# Patient Record
Sex: MALE | Race: WHITE | NOT HISPANIC OR LATINO | Employment: OTHER | URBAN - METROPOLITAN AREA
[De-identification: names, ages, dates, MRNs, and addresses within clinical notes are randomized per-mention and may not be internally consistent; named-entity substitution may affect disease eponyms.]

---

## 2023-03-17 LAB
ANION GAP IN SER/PLAS: 13 MMOL/L (ref 10–20)
CALCIUM (MG/DL) IN SER/PLAS: 9.8 MG/DL (ref 8.6–10.3)
CARBON DIOXIDE, TOTAL (MMOL/L) IN SER/PLAS: 29 MMOL/L (ref 21–32)
CHLORIDE (MMOL/L) IN SER/PLAS: 98 MMOL/L (ref 98–107)
CREATININE (MG/DL) IN SER/PLAS: 0.98 MG/DL (ref 0.5–1.3)
GFR MALE: 80 ML/MIN/1.73M2
GLUCOSE (MG/DL) IN SER/PLAS: 103 MG/DL (ref 74–99)
POTASSIUM (MMOL/L) IN SER/PLAS: 4.4 MMOL/L (ref 3.5–5.3)
SODIUM (MMOL/L) IN SER/PLAS: 136 MMOL/L (ref 136–145)
UREA NITROGEN (MG/DL) IN SER/PLAS: 28 MG/DL (ref 6–23)

## 2023-12-15 ENCOUNTER — OFFICE VISIT (OUTPATIENT)
Dept: CARDIOLOGY | Facility: CLINIC | Age: 73
End: 2023-12-15
Payer: MEDICARE

## 2023-12-15 VITALS
HEART RATE: 72 BPM | BODY MASS INDEX: 29.47 KG/M2 | SYSTOLIC BLOOD PRESSURE: 98 MMHG | WEIGHT: 199 LBS | DIASTOLIC BLOOD PRESSURE: 54 MMHG | HEIGHT: 69 IN

## 2023-12-15 DIAGNOSIS — I25.10 CORONARY ARTERY DISEASE INVOLVING NATIVE CORONARY ARTERY OF NATIVE HEART WITHOUT ANGINA PECTORIS: ICD-10-CM

## 2023-12-15 DIAGNOSIS — I35.0 NONRHEUMATIC AORTIC VALVE STENOSIS: ICD-10-CM

## 2023-12-15 DIAGNOSIS — F17.200 NICOTINE DEPENDENCE, UNCOMPLICATED, UNSPECIFIED NICOTINE PRODUCT TYPE: ICD-10-CM

## 2023-12-15 DIAGNOSIS — I50.22 CHRONIC SYSTOLIC HEART FAILURE (MULTI): Primary | ICD-10-CM

## 2023-12-15 PROCEDURE — 4004F PT TOBACCO SCREEN RCVD TLK: CPT | Performed by: INTERNAL MEDICINE

## 2023-12-15 PROCEDURE — 1159F MED LIST DOCD IN RCRD: CPT | Performed by: INTERNAL MEDICINE

## 2023-12-15 PROCEDURE — 1160F RVW MEDS BY RX/DR IN RCRD: CPT | Performed by: INTERNAL MEDICINE

## 2023-12-15 PROCEDURE — 99215 OFFICE O/P EST HI 40 MIN: CPT | Performed by: INTERNAL MEDICINE

## 2023-12-15 PROCEDURE — 1125F AMNT PAIN NOTED PAIN PRSNT: CPT | Performed by: INTERNAL MEDICINE

## 2023-12-15 RX ORDER — TRAZODONE HYDROCHLORIDE 50 MG/1
50 TABLET ORAL NIGHTLY
COMMUNITY
Start: 2023-12-14

## 2023-12-15 RX ORDER — SPIRONOLACTONE 25 MG/1
12.5 TABLET ORAL DAILY
COMMUNITY
Start: 2023-01-17

## 2023-12-15 RX ORDER — MULTIVIT-MIN/IRON FUM/FOLIC AC 7.5 MG-4
1 TABLET ORAL DAILY
COMMUNITY
Start: 2023-01-13

## 2023-12-15 RX ORDER — LAMOTRIGINE 25 MG/1
150 TABLET ORAL 2 TIMES DAILY
COMMUNITY

## 2023-12-15 RX ORDER — ATORVASTATIN CALCIUM 20 MG/1
20 TABLET, FILM COATED ORAL DAILY
COMMUNITY
Start: 2023-01-17

## 2023-12-15 RX ORDER — LANOLIN ALCOHOL/MO/W.PET/CERES
400 CREAM (GRAM) TOPICAL DAILY
COMMUNITY
Start: 2016-02-24

## 2023-12-15 RX ORDER — PANTOPRAZOLE SODIUM 20 MG/1
20 TABLET, DELAYED RELEASE ORAL DAILY
COMMUNITY
Start: 2023-01-17 | End: 2023-12-15 | Stop reason: ALTCHOICE

## 2023-12-15 RX ORDER — FOLIC ACID 1 MG/1
1 TABLET ORAL DAILY
COMMUNITY
Start: 2023-01-13

## 2023-12-15 RX ORDER — FERROUS SULFATE 325(65) MG
325 TABLET ORAL DAILY
COMMUNITY
Start: 2023-05-30

## 2023-12-15 RX ORDER — METOPROLOL SUCCINATE 25 MG/1
25 TABLET, EXTENDED RELEASE ORAL 2 TIMES DAILY
COMMUNITY
Start: 2020-05-08

## 2023-12-15 RX ORDER — ISOSORBIDE MONONITRATE 10 MG/1
10 TABLET ORAL DAILY
COMMUNITY
Start: 2023-06-16

## 2023-12-15 RX ORDER — ASPIRIN 81 MG/1
81 TABLET ORAL DAILY
COMMUNITY
Start: 2023-01-17

## 2023-12-15 RX ORDER — BUMETANIDE 2 MG/1
2 TABLET ORAL 2 TIMES DAILY
COMMUNITY
Start: 2023-01-17

## 2023-12-15 RX ORDER — NICOTINE POLACRILEX 2 MG/1
2 LOZENGE ORAL EVERY 2 HOUR PRN
COMMUNITY

## 2023-12-15 RX ORDER — MELATONIN 3 MG
6 CAPSULE ORAL NIGHTLY PRN
COMMUNITY
Start: 2023-09-18

## 2023-12-15 RX ORDER — POTASSIUM CHLORIDE 750 MG/1
10 TABLET, EXTENDED RELEASE ORAL DAILY
COMMUNITY

## 2023-12-15 RX ORDER — IPRATROPIUM BROMIDE AND ALBUTEROL SULFATE 2.5; .5 MG/3ML; MG/3ML
SOLUTION RESPIRATORY (INHALATION)
COMMUNITY
Start: 2016-02-24

## 2023-12-15 NOTE — PROGRESS NOTES
"Chief Complaint:   Please see below.     History Of Present Illness:    Milton Lane is a 73 y.o. male presenting with CAD, chronic systolic heart failure.    This 73-year-old man with chronic CAD, chronic ischemic cardiomyopathy with LVEF of 35 to 40% with a pseudo normal diastolic filling pattern by echo January 2023, COPD, and mild aortic stenosis comes to the office in routine follow-up.     During his January 2023 hospitalization, despite having abnormal testing and decompensated symptoms, he unequivocally preferred conservative medical management over an invasive approach. We were constrained by medical intolerances. He has a true intolerance of ACE inhibitors and ARB's for which reason he is not on these medications or Entresto. Please see my prior notes for complete details. Overall he has been stable.     Two months ago he ran out of his medications, during which time he had episodic chest pain.  He now assures me that he is compliant with all medications as outlined on today's list.  [facility]    The patient is still smoking despite my warnings.       Last Recorded Vitals:  Vitals:    12/15/23 0900   BP: 98/54   BP Location: Left arm   Patient Position: Sitting   Pulse: 72   Weight: 90.3 kg (199 lb)   Height: 1.753 m (5' 9\")       Past Medical History:  He has no past medical history on file.    Past Surgical History:  He has no past surgical history on file.      Social History:  He reports that he has been smoking cigarettes. He has never used smokeless tobacco. He reports current alcohol use. He reports that he does not use drugs.    Family History:  No family history on file.     Allergies:  Losartan    Outpatient Medications:  Current Outpatient Medications   Medication Instructions    aspirin 81 mg, oral, Daily    atorvastatin (LIPITOR) 20 mg, oral, Daily    bumetanide (BUMEX) 2 mg, oral, 2 times daily    ferrous sulfate (325 mg ferrous sulfate) 325 mg, oral, " Daily    folic acid (Folvite) 1 mg tablet 1 tablet, oral, Daily    ipratropium-albuteroL (Duo-Neb) 0.5-2.5 mg/3 mL nebulizer solution Use as directed - As needed.    isosorbide mononitrate 10 mg, oral, Daily    lamoTRIgine (LAMICTAL) 150 mg, oral, 2 times daily    magnesium oxide (MAG-OX) 400 mg, oral, Daily    melatonin 6 mg, oral, Nightly PRN    metoprolol succinate XL (TOPROL-XL) 25 mg, oral, 2 times daily    multivitamin with minerals (multivit-min-iron fum-folic ac) tablet 1 tablet, oral, Daily    nicotine polacrilex (COMMIT) 2 mg, Mouth/Throat, Every 2 hour PRN    potassium chloride CR 10 mEq ER tablet 10 mEq, oral, Daily    spironolactone (ALDACTONE) 12.5 mg, oral, Daily    thiamine HCl (VITAMIN B-1 ORAL) 1 tablet, oral, Daily    traZODone (DESYREL) 50 mg, oral, Nightly       Physical Exam:  GENERAL:  pleasant 73 year-old  HEENT: No xanthelasma  NECK: Supple, no palpable adenopathy or thyromegaly  CHEST: Clear to auscultation, respiratory effort unlabored  CARDIAC: RRR, normal S1 and S2, no audible  rub, gallop, carotids are brisk, PMI is not displaced; there is a grade 2 systolic murmur heard best at the RSB  ABD: Active bowel sounds, nontender, no organomegaly, no evidence of ascites  EXT: No clubbing, cyanosis, edema, or tenderness  NEURO: Awake, alert, appropriate, speech is fluent       Last Labs:  CBC -  Lab Results   Component Value Date    WBC 5.8 01/17/2023    HGB 13.6 01/17/2023    HCT 41.9 01/17/2023    MCV 99 01/17/2023     01/17/2023       CMP -  Lab Results   Component Value Date    CALCIUM 9.8 03/17/2023    PHOS 4.9 01/09/2023    PROT 6.5 01/09/2023    ALBUMIN 3.4 01/09/2023    AST 20 01/09/2023    ALT 12 01/09/2023    ALKPHOS 96 01/09/2023    BILITOT 0.8 01/09/2023       LIPID PANEL -   Lab Results   Component Value Date    CHOL 125 01/08/2023    TRIG 48 01/08/2023    HDL 44.4 01/08/2023    CHHDL 2.8 01/08/2023    LDLF 71 01/08/2023    VLDL 10 01/08/2023       RENAL FUNCTION PANEL -    Lab Results   Component Value Date    GLUCOSE 103 (H) 03/17/2023     03/17/2023    K 4.4 03/17/2023    CL 98 03/17/2023    CO2 29 03/17/2023    ANIONGAP 13 03/17/2023    BUN 28 (H) 03/17/2023    CREATININE 0.98 03/17/2023    GFRMALE 80 03/17/2023    CALCIUM 9.8 03/17/2023    PHOS 4.9 01/09/2023    ALBUMIN 3.4 01/09/2023        Lab Results   Component Value Date    BNP 1,205 (H) 01/12/2023    HGBA1C 5.5 01/08/2023         No recent results to review    Assessment/Plan   Problem List Items Addressed This Visit          Cardiac and Vasculature    Coronary artery disease involving native coronary artery of native heart without angina pectoris    Relevant Medications    isosorbide mononitrate 10 mg tablet    metoprolol succinate XL (Toprol-XL) 25 mg 24 hr tablet    Chronic systolic heart failure (CMS/HCC) - Primary    Relevant Medications    isosorbide mononitrate 10 mg tablet    metoprolol succinate XL (Toprol-XL) 25 mg 24 hr tablet    Other Relevant Orders    Follow Up In Cardiology    Nonrheumatic aortic valve stenosis    Relevant Medications    isosorbide mononitrate 10 mg tablet    metoprolol succinate XL (Toprol-XL) 25 mg 24 hr tablet       Tobacco    Nicotine dependence, uncomplicated      CAD: The patient has stable, functional class I CAD, and is doing well.  No additional testing is necessary at present. Important aspects of lifestyle modification were discussed in detail with the patient.  Recent labs and testing have been reviewed.  2.  The patient has stage C class 3 chronic systolic heart failure, and is stable on medical therapy.  Continue present medical regimen.  Discussed the importance of limiting sodium intake to 2000 mg daily, checking daily weights, and contacting us with weight gain in excess of 5 lbs in one week.  Unfortunately, we cannot start ACE inhibitors, ARB's, or Entresto because of his known intolerances to these classes of medications.  3.  Aortic stenosis: Mild on echo in 2023,  stable on exam, no additional testing necessary at present.  4.  Above all else, I advised the patient to quit tobacco, or else risk certain future cardiovascular morbidity, and/or mortality.      Renato Mora MD

## 2023-12-15 NOTE — PATIENT INSTRUCTIONS

## 2024-01-01 ENCOUNTER — APPOINTMENT (OUTPATIENT)
Dept: CARDIOLOGY | Facility: HOSPITAL | Age: 74
DRG: 291 | End: 2024-01-01
Payer: MEDICARE

## 2024-01-01 ENCOUNTER — APPOINTMENT (OUTPATIENT)
Dept: RADIOLOGY | Facility: HOSPITAL | Age: 74
DRG: 291 | End: 2024-01-01
Payer: MEDICARE

## 2024-01-01 PROCEDURE — 83735 ASSAY OF MAGNESIUM: CPT | Performed by: NURSE PRACTITIONER

## 2024-01-01 PROCEDURE — 71045 X-RAY EXAM CHEST 1 VIEW: CPT

## 2024-01-01 PROCEDURE — 80048 BASIC METABOLIC PNL TOTAL CA: CPT | Performed by: NURSE PRACTITIONER

## 2024-01-01 PROCEDURE — 85027 COMPLETE CBC AUTOMATED: CPT | Performed by: NURSE PRACTITIONER

## 2024-01-01 PROCEDURE — 82374 ASSAY BLOOD CARBON DIOXIDE: CPT | Performed by: NURSE PRACTITIONER

## 2024-01-01 PROCEDURE — 84295 ASSAY OF SERUM SODIUM: CPT | Performed by: NURSE PRACTITIONER

## 2024-01-01 PROCEDURE — 81001 URINALYSIS AUTO W/SCOPE: CPT | Performed by: EMERGENCY MEDICINE

## 2024-01-01 PROCEDURE — 85025 COMPLETE CBC W/AUTO DIFF WBC: CPT | Performed by: EMERGENCY MEDICINE

## 2024-01-01 PROCEDURE — 83735 ASSAY OF MAGNESIUM: CPT | Performed by: EMERGENCY MEDICINE

## 2024-01-01 PROCEDURE — 83615 LACTATE (LD) (LDH) ENZYME: CPT | Performed by: NURSE PRACTITIONER

## 2024-01-01 PROCEDURE — 93005 ELECTROCARDIOGRAM TRACING: CPT

## 2024-01-01 PROCEDURE — 84484 ASSAY OF TROPONIN QUANT: CPT | Performed by: EMERGENCY MEDICINE

## 2024-01-01 PROCEDURE — 85379 FIBRIN DEGRADATION QUANT: CPT | Performed by: EMERGENCY MEDICINE

## 2024-01-01 PROCEDURE — 80053 COMPREHEN METABOLIC PANEL: CPT | Performed by: EMERGENCY MEDICINE

## 2024-01-01 PROCEDURE — 83880 ASSAY OF NATRIURETIC PEPTIDE: CPT | Performed by: EMERGENCY MEDICINE

## 2024-01-01 PROCEDURE — 87635 SARS-COV-2 COVID-19 AMP PRB: CPT | Performed by: EMERGENCY MEDICINE

## 2024-01-01 PROCEDURE — 71275 CT ANGIOGRAPHY CHEST: CPT

## 2024-01-01 PROCEDURE — 83880 ASSAY OF NATRIURETIC PEPTIDE: CPT | Performed by: INTERNAL MEDICINE

## 2024-06-14 ENCOUNTER — APPOINTMENT (OUTPATIENT)
Dept: CARDIOLOGY | Facility: HOSPITAL | Age: 74
End: 2024-06-14
Payer: MEDICARE

## 2024-06-14 ENCOUNTER — APPOINTMENT (OUTPATIENT)
Dept: RADIOLOGY | Facility: HOSPITAL | Age: 74
End: 2024-06-14
Payer: MEDICARE

## 2024-06-14 ENCOUNTER — HOSPITAL ENCOUNTER (INPATIENT)
Facility: HOSPITAL | Age: 74
End: 2024-06-14
Attending: EMERGENCY MEDICINE | Admitting: INTERNAL MEDICINE
Payer: MEDICARE

## 2024-06-14 DIAGNOSIS — E87.1 HYPONATREMIA: ICD-10-CM

## 2024-06-14 DIAGNOSIS — I47.20 V TACH (MULTI): ICD-10-CM

## 2024-06-14 DIAGNOSIS — I25.10 CORONARY ARTERY DISEASE INVOLVING NATIVE CORONARY ARTERY OF NATIVE HEART WITHOUT ANGINA PECTORIS: ICD-10-CM

## 2024-06-14 DIAGNOSIS — I73.9 PAD (PERIPHERAL ARTERY DISEASE) (CMS-HCC): ICD-10-CM

## 2024-06-14 DIAGNOSIS — R06.02 SHORTNESS OF BREATH: Primary | ICD-10-CM

## 2024-06-14 DIAGNOSIS — R09.81 NASAL CONGESTION: ICD-10-CM

## 2024-06-14 DIAGNOSIS — I50.9 ACUTE ON CHRONIC CONGESTIVE HEART FAILURE, UNSPECIFIED HEART FAILURE TYPE (MULTI): ICD-10-CM

## 2024-06-14 DIAGNOSIS — E83.42 HYPOMAGNESEMIA: ICD-10-CM

## 2024-06-14 DIAGNOSIS — I50.43 ACUTE ON CHRONIC COMBINED SYSTOLIC (CONGESTIVE) AND DIASTOLIC (CONGESTIVE) HEART FAILURE (MULTI): ICD-10-CM

## 2024-06-14 DIAGNOSIS — I50.22 CHRONIC SYSTOLIC HEART FAILURE (MULTI): ICD-10-CM

## 2024-06-14 DIAGNOSIS — I25.10 CORONARY ARTERY DISEASE DUE TO CALCIFIED CORONARY LESION: ICD-10-CM

## 2024-06-14 DIAGNOSIS — I25.84 CORONARY ARTERY DISEASE DUE TO CALCIFIED CORONARY LESION: ICD-10-CM

## 2024-06-14 DIAGNOSIS — F17.200 NICOTINE DEPENDENCE, UNCOMPLICATED, UNSPECIFIED NICOTINE PRODUCT TYPE: ICD-10-CM

## 2024-06-14 DIAGNOSIS — R09.02 HYPOXEMIA: ICD-10-CM

## 2024-06-14 DIAGNOSIS — I71.40 ABDOMINAL AORTIC ANEURYSM, WITHOUT RUPTURE, UNSPECIFIED (CMS-HCC): ICD-10-CM

## 2024-06-14 LAB
ALBUMIN SERPL BCP-MCNC: 3.8 G/DL (ref 3.4–5)
ALP SERPL-CCNC: 75 U/L (ref 33–136)
ALT SERPL W P-5'-P-CCNC: 10 U/L (ref 10–52)
ANION GAP SERPL CALC-SCNC: 12 MMOL/L
AST SERPL W P-5'-P-CCNC: 17 U/L (ref 9–39)
BASOPHILS # BLD AUTO: 0.04 X10*3/UL (ref 0–0.1)
BASOPHILS NFR BLD AUTO: 0.5 %
BILIRUB SERPL-MCNC: 0.7 MG/DL (ref 0–1.2)
BNP SERPL-MCNC: 1747 PG/ML (ref 0–99)
BUN SERPL-MCNC: 10 MG/DL (ref 6–23)
CALCIUM SERPL-MCNC: 8.8 MG/DL (ref 8.6–10.3)
CARDIAC TROPONIN I PNL SERPL HS: 81 NG/L (ref 0–20)
CARDIAC TROPONIN I PNL SERPL HS: 83 NG/L (ref 0–20)
CHLORIDE SERPL-SCNC: 90 MMOL/L (ref 98–107)
CO2 SERPL-SCNC: 29 MMOL/L (ref 21–32)
CREAT SERPL-MCNC: 0.69 MG/DL (ref 0.5–1.3)
EGFRCR SERPLBLD CKD-EPI 2021: >90 ML/MIN/1.73M*2
EOSINOPHIL # BLD AUTO: 0.01 X10*3/UL (ref 0–0.4)
EOSINOPHIL NFR BLD AUTO: 0.1 %
ERYTHROCYTE [DISTWIDTH] IN BLOOD BY AUTOMATED COUNT: 13.3 % (ref 11.5–14.5)
GLUCOSE SERPL-MCNC: 122 MG/DL (ref 74–99)
HCT VFR BLD AUTO: 36.7 % (ref 41–52)
HGB BLD-MCNC: 12.2 G/DL (ref 13.5–17.5)
IMM GRANULOCYTES # BLD AUTO: 0.03 X10*3/UL (ref 0–0.5)
IMM GRANULOCYTES NFR BLD AUTO: 0.4 % (ref 0–0.9)
INR PPP: 1.2 (ref 0.9–1.1)
LYMPHOCYTES # BLD AUTO: 0.75 X10*3/UL (ref 0.8–3)
LYMPHOCYTES NFR BLD AUTO: 9.8 %
MAGNESIUM SERPL-MCNC: 1.75 MG/DL (ref 1.6–2.4)
MCH RBC QN AUTO: 32.2 PG (ref 26–34)
MCHC RBC AUTO-ENTMCNC: 33.2 G/DL (ref 32–36)
MCV RBC AUTO: 97 FL (ref 80–100)
MONOCYTES # BLD AUTO: 1.15 X10*3/UL (ref 0.05–0.8)
MONOCYTES NFR BLD AUTO: 15 %
NEUTROPHILS # BLD AUTO: 5.68 X10*3/UL (ref 1.6–5.5)
NEUTROPHILS NFR BLD AUTO: 74.2 %
NRBC BLD-RTO: 0 /100 WBCS (ref 0–0)
PLATELET # BLD AUTO: 248 X10*3/UL (ref 150–450)
POTASSIUM SERPL-SCNC: 4 MMOL/L (ref 3.5–5.3)
PROT SERPL-MCNC: 7 G/DL (ref 6.4–8.2)
PROTHROMBIN TIME: 13.8 SECONDS (ref 9.8–12.8)
RBC # BLD AUTO: 3.79 X10*6/UL (ref 4.5–5.9)
SODIUM SERPL-SCNC: 127 MMOL/L (ref 136–145)
TSH SERPL-ACNC: 1.2 MIU/L (ref 0.44–3.98)
WBC # BLD AUTO: 7.7 X10*3/UL (ref 4.4–11.3)

## 2024-06-14 PROCEDURE — 85025 COMPLETE CBC W/AUTO DIFF WBC: CPT | Performed by: EMERGENCY MEDICINE

## 2024-06-14 PROCEDURE — 71045 X-RAY EXAM CHEST 1 VIEW: CPT

## 2024-06-14 PROCEDURE — 84484 ASSAY OF TROPONIN QUANT: CPT | Mod: 91 | Performed by: EMERGENCY MEDICINE

## 2024-06-14 PROCEDURE — 83880 ASSAY OF NATRIURETIC PEPTIDE: CPT | Performed by: EMERGENCY MEDICINE

## 2024-06-14 PROCEDURE — 85610 PROTHROMBIN TIME: CPT | Performed by: EMERGENCY MEDICINE

## 2024-06-14 PROCEDURE — 93005 ELECTROCARDIOGRAM TRACING: CPT

## 2024-06-14 PROCEDURE — 80053 COMPREHEN METABOLIC PANEL: CPT | Performed by: EMERGENCY MEDICINE

## 2024-06-14 PROCEDURE — 83735 ASSAY OF MAGNESIUM: CPT

## 2024-06-14 PROCEDURE — 71045 X-RAY EXAM CHEST 1 VIEW: CPT | Performed by: RADIOLOGY

## 2024-06-14 PROCEDURE — 84443 ASSAY THYROID STIM HORMONE: CPT

## 2024-06-14 PROCEDURE — 85610 PROTHROMBIN TIME: CPT | Performed by: STUDENT IN AN ORGANIZED HEALTH CARE EDUCATION/TRAINING PROGRAM

## 2024-06-14 PROCEDURE — 36415 COLL VENOUS BLD VENIPUNCTURE: CPT | Performed by: EMERGENCY MEDICINE

## 2024-06-14 PROCEDURE — 84484 ASSAY OF TROPONIN QUANT: CPT | Performed by: EMERGENCY MEDICINE

## 2024-06-14 PROCEDURE — 83880 ASSAY OF NATRIURETIC PEPTIDE: CPT | Performed by: STUDENT IN AN ORGANIZED HEALTH CARE EDUCATION/TRAINING PROGRAM

## 2024-06-14 PROCEDURE — 85025 COMPLETE CBC W/AUTO DIFF WBC: CPT | Performed by: STUDENT IN AN ORGANIZED HEALTH CARE EDUCATION/TRAINING PROGRAM

## 2024-06-14 PROCEDURE — 83930 ASSAY OF BLOOD OSMOLALITY: CPT | Mod: STJLAB

## 2024-06-14 PROCEDURE — 36415 COLL VENOUS BLD VENIPUNCTURE: CPT | Performed by: STUDENT IN AN ORGANIZED HEALTH CARE EDUCATION/TRAINING PROGRAM

## 2024-06-14 PROCEDURE — 99285 EMERGENCY DEPT VISIT HI MDM: CPT

## 2024-06-14 RX ORDER — POLYETHYLENE GLYCOL 3350 17 G/17G
17 POWDER, FOR SOLUTION ORAL DAILY
Status: DISCONTINUED | OUTPATIENT
Start: 2024-06-15 | End: 2024-06-19 | Stop reason: HOSPADM

## 2024-06-14 RX ORDER — ENOXAPARIN SODIUM 100 MG/ML
40 INJECTION SUBCUTANEOUS EVERY 24 HOURS
Status: DISCONTINUED | OUTPATIENT
Start: 2024-06-15 | End: 2024-06-19 | Stop reason: HOSPADM

## 2024-06-14 RX ORDER — LANOLIN ALCOHOL/MO/W.PET/CERES
400 CREAM (GRAM) TOPICAL ONCE
Status: COMPLETED | OUTPATIENT
Start: 2024-06-14 | End: 2024-06-15

## 2024-06-14 RX ORDER — FUROSEMIDE 10 MG/ML
40 INJECTION INTRAMUSCULAR; INTRAVENOUS ONCE
Status: COMPLETED | OUTPATIENT
Start: 2024-06-14 | End: 2024-06-15

## 2024-06-14 ASSESSMENT — LIFESTYLE VARIABLES
TOTAL SCORE: 0
EVER FELT BAD OR GUILTY ABOUT YOUR DRINKING: NO
HAVE YOU EVER FELT YOU SHOULD CUT DOWN ON YOUR DRINKING: NO
EVER HAD A DRINK FIRST THING IN THE MORNING TO STEADY YOUR NERVES TO GET RID OF A HANGOVER: NO
HAVE PEOPLE ANNOYED YOU BY CRITICIZING YOUR DRINKING: NO

## 2024-06-14 ASSESSMENT — COLUMBIA-SUICIDE SEVERITY RATING SCALE - C-SSRS
2. HAVE YOU ACTUALLY HAD ANY THOUGHTS OF KILLING YOURSELF?: NO
1. IN THE PAST MONTH, HAVE YOU WISHED YOU WERE DEAD OR WISHED YOU COULD GO TO SLEEP AND NOT WAKE UP?: NO
6. HAVE YOU EVER DONE ANYTHING, STARTED TO DO ANYTHING, OR PREPARED TO DO ANYTHING TO END YOUR LIFE?: NO

## 2024-06-14 ASSESSMENT — PAIN DESCRIPTION - LOCATION: LOCATION: LEG

## 2024-06-14 ASSESSMENT — PAIN - FUNCTIONAL ASSESSMENT: PAIN_FUNCTIONAL_ASSESSMENT: 0-10

## 2024-06-14 ASSESSMENT — PAIN SCALES - GENERAL: PAINLEVEL_OUTOF10: 4

## 2024-06-15 ENCOUNTER — APPOINTMENT (OUTPATIENT)
Dept: CARDIOLOGY | Facility: HOSPITAL | Age: 74
End: 2024-06-15
Payer: MEDICARE

## 2024-06-15 PROBLEM — I50.43 ACUTE ON CHRONIC COMBINED SYSTOLIC (CONGESTIVE) AND DIASTOLIC (CONGESTIVE) HEART FAILURE (MULTI): Status: ACTIVE | Noted: 2024-06-15

## 2024-06-15 LAB
ALBUMIN SERPL BCP-MCNC: 3.7 G/DL (ref 3.4–5)
ALBUMIN SERPL BCP-MCNC: 3.8 G/DL (ref 3.4–5)
ALBUMIN SERPL BCP-MCNC: 3.9 G/DL (ref 3.4–5)
ANION GAP SERPL CALC-SCNC: 12 MMOL/L (ref 10–20)
ANION GAP SERPL CALC-SCNC: 9 MMOL/L (ref 10–20)
ANION GAP SERPL CALC-SCNC: 9 MMOL/L (ref 10–20)
APPEARANCE UR: CLEAR
ATRIAL RATE: 70 BPM
ATRIAL RATE: 74 BPM
BILIRUB UR STRIP.AUTO-MCNC: NEGATIVE MG/DL
BUN SERPL-MCNC: 12 MG/DL (ref 6–23)
BUN SERPL-MCNC: 12 MG/DL (ref 6–23)
BUN SERPL-MCNC: 14 MG/DL (ref 6–23)
CALCIUM SERPL-MCNC: 8.7 MG/DL (ref 8.6–10.3)
CALCIUM SERPL-MCNC: 9 MG/DL (ref 8.6–10.3)
CALCIUM SERPL-MCNC: 9.2 MG/DL (ref 8.6–10.3)
CHLORIDE SERPL-SCNC: 89 MMOL/L (ref 98–107)
CHLORIDE SERPL-SCNC: 90 MMOL/L (ref 98–107)
CHLORIDE SERPL-SCNC: 91 MMOL/L (ref 98–107)
CHLORIDE UR-SCNC: 75 MMOL/L
CHLORIDE/CREATININE (MMOL/G) IN URINE: 156 MMOL/G CREAT (ref 23–275)
CO2 SERPL-SCNC: 29 MMOL/L (ref 21–32)
CO2 SERPL-SCNC: 31 MMOL/L (ref 21–32)
CO2 SERPL-SCNC: 34 MMOL/L (ref 21–32)
COLOR UR: ABNORMAL
CREAT SERPL-MCNC: 0.7 MG/DL (ref 0.5–1.3)
CREAT SERPL-MCNC: 0.73 MG/DL (ref 0.5–1.3)
CREAT SERPL-MCNC: 0.77 MG/DL (ref 0.5–1.3)
CREAT UR-MCNC: 48 MG/DL (ref 20–370)
EGFRCR SERPLBLD CKD-EPI 2021: >90 ML/MIN/1.73M*2
ERYTHROCYTE [DISTWIDTH] IN BLOOD BY AUTOMATED COUNT: 13.4 % (ref 11.5–14.5)
ETHANOL SERPL-MCNC: <10 MG/DL
GLUCOSE SERPL-MCNC: 102 MG/DL (ref 74–99)
GLUCOSE SERPL-MCNC: 104 MG/DL (ref 74–99)
GLUCOSE SERPL-MCNC: 84 MG/DL (ref 74–99)
GLUCOSE UR STRIP.AUTO-MCNC: ABNORMAL MG/DL
HCT VFR BLD AUTO: 38.2 % (ref 41–52)
HGB BLD-MCNC: 12.5 G/DL (ref 13.5–17.5)
KETONES UR STRIP.AUTO-MCNC: NEGATIVE MG/DL
LEUKOCYTE ESTERASE UR QL STRIP.AUTO: ABNORMAL
MAGNESIUM SERPL-MCNC: 1.84 MG/DL (ref 1.6–2.4)
MCH RBC QN AUTO: 32.7 PG (ref 26–34)
MCHC RBC AUTO-ENTMCNC: 32.7 G/DL (ref 32–36)
MCV RBC AUTO: 100 FL (ref 80–100)
MUCOUS THREADS #/AREA URNS AUTO: ABNORMAL /LPF
NITRITE UR QL STRIP.AUTO: NEGATIVE
NRBC BLD-RTO: 0 /100 WBCS (ref 0–0)
OSMOLALITY SERPL: 264 MOSM/KG (ref 280–300)
P AXIS: 39 DEGREES
P AXIS: 46 DEGREES
P OFFSET: 153 MS
P OFFSET: 180 MS
P ONSET: 119 MS
P ONSET: 126 MS
PH UR STRIP.AUTO: 5.5 [PH]
PHOSPHATE SERPL-MCNC: 4.2 MG/DL (ref 2.5–4.9)
PHOSPHATE SERPL-MCNC: 4.3 MG/DL (ref 2.5–4.9)
PHOSPHATE SERPL-MCNC: 5 MG/DL (ref 2.5–4.9)
PLATELET # BLD AUTO: 242 X10*3/UL (ref 150–450)
POTASSIUM SERPL-SCNC: 4.2 MMOL/L (ref 3.5–5.3)
POTASSIUM SERPL-SCNC: 4.2 MMOL/L (ref 3.5–5.3)
POTASSIUM SERPL-SCNC: 4.3 MMOL/L (ref 3.5–5.3)
POTASSIUM UR-SCNC: 25 MMOL/L
POTASSIUM/CREAT UR-RTO: 52 MMOL/G CREAT
PR INTERVAL: 178 MS
PR INTERVAL: 190 MS
PROT UR STRIP.AUTO-MCNC: NEGATIVE MG/DL
Q ONSET: 214 MS
Q ONSET: 215 MS
QRS COUNT: 11 BEATS
QRS COUNT: 13 BEATS
QRS DURATION: 90 MS
QRS DURATION: 98 MS
QT INTERVAL: 416 MS
QT INTERVAL: 420 MS
QTC CALCULATION(BAZETT): 453 MS
QTC CALCULATION(BAZETT): 461 MS
QTC FREDERICIA: 442 MS
QTC FREDERICIA: 446 MS
R AXIS: 104 DEGREES
R AXIS: 94 DEGREES
RBC # BLD AUTO: 3.82 X10*6/UL (ref 4.5–5.9)
RBC # UR STRIP.AUTO: NEGATIVE /UL
RBC #/AREA URNS AUTO: ABNORMAL /HPF
SODIUM SERPL-SCNC: 127 MMOL/L (ref 136–145)
SODIUM SERPL-SCNC: 127 MMOL/L (ref 136–145)
SODIUM SERPL-SCNC: 128 MMOL/L (ref 136–145)
SODIUM UR-SCNC: 69 MMOL/L
SODIUM/CREAT UR-RTO: 144 MMOL/G CREAT
SP GR UR STRIP.AUTO: 1.01
SQUAMOUS #/AREA URNS AUTO: ABNORMAL /HPF
T AXIS: -24 DEGREES
T AXIS: -32 DEGREES
T OFFSET: 423 MS
T OFFSET: 424 MS
UROBILINOGEN UR STRIP.AUTO-MCNC: NORMAL MG/DL
VENTRICULAR RATE: 70 BPM
VENTRICULAR RATE: 74 BPM
WBC # BLD AUTO: 7.7 X10*3/UL (ref 4.4–11.3)
WBC #/AREA URNS AUTO: ABNORMAL /HPF
YEAST BUDDING #/AREA UR COMP ASSIST: PRESENT /HPF

## 2024-06-15 PROCEDURE — 80069 RENAL FUNCTION PANEL: CPT

## 2024-06-15 PROCEDURE — 36415 COLL VENOUS BLD VENIPUNCTURE: CPT

## 2024-06-15 PROCEDURE — 2500000004 HC RX 250 GENERAL PHARMACY W/ HCPCS (ALT 636 FOR OP/ED)

## 2024-06-15 PROCEDURE — 2500000002 HC RX 250 W HCPCS SELF ADMINISTERED DRUGS (ALT 637 FOR MEDICARE OP, ALT 636 FOR OP/ED)

## 2024-06-15 PROCEDURE — S4991 NICOTINE PATCH NONLEGEND: HCPCS

## 2024-06-15 PROCEDURE — 80069 RENAL FUNCTION PANEL: CPT | Mod: 91

## 2024-06-15 PROCEDURE — 82436 ASSAY OF URINE CHLORIDE: CPT

## 2024-06-15 PROCEDURE — 2500000002 HC RX 250 W HCPCS SELF ADMINISTERED DRUGS (ALT 637 FOR MEDICARE OP, ALT 636 FOR OP/ED): Mod: MUE | Performed by: INTERNAL MEDICINE

## 2024-06-15 PROCEDURE — 2500000001 HC RX 250 WO HCPCS SELF ADMINISTERED DRUGS (ALT 637 FOR MEDICARE OP)

## 2024-06-15 PROCEDURE — 2500000005 HC RX 250 GENERAL PHARMACY W/O HCPCS: Performed by: INTERNAL MEDICINE

## 2024-06-15 PROCEDURE — 93005 ELECTROCARDIOGRAM TRACING: CPT

## 2024-06-15 PROCEDURE — 2060000001 HC INTERMEDIATE ICU ROOM DAILY

## 2024-06-15 PROCEDURE — 96374 THER/PROPH/DIAG INJ IV PUSH: CPT

## 2024-06-15 PROCEDURE — 99223 1ST HOSP IP/OBS HIGH 75: CPT

## 2024-06-15 PROCEDURE — 2500000004 HC RX 250 GENERAL PHARMACY W/ HCPCS (ALT 636 FOR OP/ED): Performed by: EMERGENCY MEDICINE

## 2024-06-15 PROCEDURE — 84100 ASSAY OF PHOSPHORUS: CPT

## 2024-06-15 PROCEDURE — 82077 ASSAY SPEC XCP UR&BREATH IA: CPT

## 2024-06-15 PROCEDURE — 85027 COMPLETE CBC AUTOMATED: CPT

## 2024-06-15 PROCEDURE — 94640 AIRWAY INHALATION TREATMENT: CPT | Mod: MUE

## 2024-06-15 PROCEDURE — 83735 ASSAY OF MAGNESIUM: CPT

## 2024-06-15 PROCEDURE — 81001 URINALYSIS AUTO W/SCOPE: CPT

## 2024-06-15 RX ORDER — BUMETANIDE 1 MG/1
2 TABLET ORAL
Status: DISCONTINUED | OUTPATIENT
Start: 2024-06-15 | End: 2024-06-17

## 2024-06-15 RX ORDER — NICOTINE POLACRILEX 2 MG/1
2 LOZENGE ORAL AS NEEDED
COMMUNITY

## 2024-06-15 RX ORDER — SPIRONOLACTONE 25 MG/1
12.5 TABLET ORAL DAILY
Status: DISCONTINUED | OUTPATIENT
Start: 2024-06-15 | End: 2024-06-18

## 2024-06-15 RX ORDER — IPRATROPIUM BROMIDE AND ALBUTEROL SULFATE 2.5; .5 MG/3ML; MG/3ML
3 SOLUTION RESPIRATORY (INHALATION) EVERY 2 HOUR PRN
Status: DISCONTINUED | OUTPATIENT
Start: 2024-06-15 | End: 2024-06-19 | Stop reason: HOSPADM

## 2024-06-15 RX ORDER — IPRATROPIUM BROMIDE AND ALBUTEROL SULFATE 2.5; .5 MG/3ML; MG/3ML
3 SOLUTION RESPIRATORY (INHALATION)
Status: DISCONTINUED | OUTPATIENT
Start: 2024-06-15 | End: 2024-06-19 | Stop reason: HOSPADM

## 2024-06-15 RX ORDER — IBUPROFEN 200 MG
1 TABLET ORAL DAILY
Status: DISCONTINUED | OUTPATIENT
Start: 2024-07-27 | End: 2024-06-19 | Stop reason: HOSPADM

## 2024-06-15 RX ORDER — ISOSORBIDE MONONITRATE 30 MG/1
15 TABLET, EXTENDED RELEASE ORAL DAILY
COMMUNITY

## 2024-06-15 RX ORDER — IPRATROPIUM BROMIDE AND ALBUTEROL SULFATE 2.5; .5 MG/3ML; MG/3ML
3 SOLUTION RESPIRATORY (INHALATION)
Status: DISCONTINUED | OUTPATIENT
Start: 2024-06-15 | End: 2024-06-15

## 2024-06-15 RX ORDER — FLUTICASONE PROPIONATE AND SALMETEROL 250; 50 UG/1; UG/1
1 POWDER RESPIRATORY (INHALATION)
COMMUNITY

## 2024-06-15 RX ORDER — LANOLIN ALCOHOL/MO/W.PET/CERES
100 CREAM (GRAM) TOPICAL DAILY
Status: DISCONTINUED | OUTPATIENT
Start: 2024-06-15 | End: 2024-06-19 | Stop reason: HOSPADM

## 2024-06-15 RX ORDER — NICOTINE 7MG/24HR
1 PATCH, TRANSDERMAL 24 HOURS TRANSDERMAL EVERY 24 HOURS
COMMUNITY

## 2024-06-15 RX ORDER — ATORVASTATIN CALCIUM 20 MG/1
20 TABLET, FILM COATED ORAL DAILY
COMMUNITY

## 2024-06-15 RX ORDER — ATORVASTATIN CALCIUM 20 MG/1
20 TABLET, FILM COATED ORAL DAILY
Status: DISCONTINUED | OUTPATIENT
Start: 2024-06-15 | End: 2024-06-19 | Stop reason: HOSPADM

## 2024-06-15 RX ORDER — SPIRONOLACTONE 25 MG/1
12.5 TABLET ORAL DAILY
Status: ON HOLD | COMMUNITY
End: 2024-06-19

## 2024-06-15 RX ORDER — ISOSORBIDE MONONITRATE 30 MG/1
15 TABLET, EXTENDED RELEASE ORAL DAILY
Status: DISCONTINUED | OUTPATIENT
Start: 2024-06-15 | End: 2024-06-19 | Stop reason: HOSPADM

## 2024-06-15 RX ORDER — MAGNESIUM SULFATE HEPTAHYDRATE 40 MG/ML
2 INJECTION, SOLUTION INTRAVENOUS ONCE
Status: COMPLETED | OUTPATIENT
Start: 2024-06-15 | End: 2024-06-15

## 2024-06-15 RX ORDER — FOLIC ACID 1 MG/1
1 TABLET ORAL DAILY
Status: DISCONTINUED | OUTPATIENT
Start: 2024-06-15 | End: 2024-06-19 | Stop reason: HOSPADM

## 2024-06-15 RX ORDER — LAMOTRIGINE 100 MG/1
100 TABLET ORAL DAILY
Status: DISCONTINUED | OUTPATIENT
Start: 2024-06-15 | End: 2024-06-19 | Stop reason: HOSPADM

## 2024-06-15 RX ORDER — LAMOTRIGINE 100 MG/1
100 TABLET ORAL DAILY
COMMUNITY

## 2024-06-15 RX ORDER — IBUPROFEN 200 MG
1 TABLET ORAL DAILY
Status: DISCONTINUED | OUTPATIENT
Start: 2024-06-15 | End: 2024-06-19 | Stop reason: HOSPADM

## 2024-06-15 RX ORDER — METOPROLOL SUCCINATE 25 MG/1
25 TABLET, EXTENDED RELEASE ORAL DAILY
Status: DISCONTINUED | OUTPATIENT
Start: 2024-06-15 | End: 2024-06-19 | Stop reason: HOSPADM

## 2024-06-15 RX ORDER — MULTIVIT-MIN/IRON FUM/FOLIC AC 7.5 MG-4
1 TABLET ORAL DAILY
Status: DISCONTINUED | OUTPATIENT
Start: 2024-06-15 | End: 2024-06-19 | Stop reason: HOSPADM

## 2024-06-15 RX ORDER — NICOTINE 7MG/24HR
1 PATCH, TRANSDERMAL 24 HOURS TRANSDERMAL DAILY
Status: DISCONTINUED | OUTPATIENT
Start: 2024-08-10 | End: 2024-06-19 | Stop reason: HOSPADM

## 2024-06-15 RX ORDER — METOPROLOL SUCCINATE 25 MG/1
25 TABLET, EXTENDED RELEASE ORAL DAILY
COMMUNITY

## 2024-06-15 RX ADMIN — FUROSEMIDE 40 MG: 10 INJECTION, SOLUTION INTRAMUSCULAR; INTRAVENOUS at 00:22

## 2024-06-15 RX ADMIN — Medication 4 L/MIN: at 09:27

## 2024-06-15 RX ADMIN — FOLIC ACID 1 MG: 1 TABLET ORAL at 12:33

## 2024-06-15 RX ADMIN — ATORVASTATIN CALCIUM 20 MG: 20 TABLET, FILM COATED ORAL at 21:07

## 2024-06-15 RX ADMIN — IPRATROPIUM BROMIDE AND ALBUTEROL SULFATE 3 ML: 2.5; .5 SOLUTION RESPIRATORY (INHALATION) at 04:51

## 2024-06-15 RX ADMIN — ENOXAPARIN SODIUM 40 MG: 40 INJECTION SUBCUTANEOUS at 08:21

## 2024-06-15 RX ADMIN — SPIRONOLACTONE 12.5 MG: 25 TABLET ORAL at 12:32

## 2024-06-15 RX ADMIN — NICOTINE 1 PATCH: 21 PATCH, EXTENDED RELEASE TRANSDERMAL at 12:31

## 2024-06-15 RX ADMIN — BUMETANIDE 2 MG: 1 TABLET ORAL at 16:36

## 2024-06-15 RX ADMIN — IPRATROPIUM BROMIDE AND ALBUTEROL SULFATE 3 ML: 2.5; .5 SOLUTION RESPIRATORY (INHALATION) at 09:27

## 2024-06-15 RX ADMIN — Medication 6 L/MIN: at 04:51

## 2024-06-15 RX ADMIN — IPRATROPIUM BROMIDE AND ALBUTEROL SULFATE 3 ML: 2.5; .5 SOLUTION RESPIRATORY (INHALATION) at 12:59

## 2024-06-15 RX ADMIN — Medication 400 MG: at 00:22

## 2024-06-15 RX ADMIN — Medication 1 TABLET: at 12:31

## 2024-06-15 RX ADMIN — IPRATROPIUM BROMIDE AND ALBUTEROL SULFATE 3 ML: 2.5; .5 SOLUTION RESPIRATORY (INHALATION) at 17:38

## 2024-06-15 RX ADMIN — EMPAGLIFLOZIN 12.5 MG: 25 TABLET, FILM COATED ORAL at 12:32

## 2024-06-15 RX ADMIN — BUMETANIDE 2 MG: 1 TABLET ORAL at 12:33

## 2024-06-15 RX ADMIN — IPRATROPIUM BROMIDE AND ALBUTEROL SULFATE 3 ML: 2.5; .5 SOLUTION RESPIRATORY (INHALATION) at 22:14

## 2024-06-15 RX ADMIN — Medication 2 L/MIN: at 17:38

## 2024-06-15 RX ADMIN — MAGNESIUM SULFATE HEPTAHYDRATE 2 G: 40 INJECTION, SOLUTION INTRAVENOUS at 08:22

## 2024-06-15 RX ADMIN — ISOSORBIDE MONONITRATE 15 MG: 30 TABLET, EXTENDED RELEASE ORAL at 12:33

## 2024-06-15 RX ADMIN — ACETAZOLAMIDE 250 MG: 500 INJECTION, POWDER, LYOPHILIZED, FOR SOLUTION INTRAVENOUS at 04:58

## 2024-06-15 RX ADMIN — LAMOTRIGINE 100 MG: 100 TABLET ORAL at 16:36

## 2024-06-15 RX ADMIN — METOPROLOL SUCCINATE 25 MG: 25 TABLET, EXTENDED RELEASE ORAL at 12:31

## 2024-06-15 RX ADMIN — THIAMINE HCL TAB 100 MG 100 MG: 100 TAB at 12:33

## 2024-06-15 RX ADMIN — Medication 2 L/MIN: at 13:00

## 2024-06-15 SDOH — SOCIAL STABILITY: SOCIAL INSECURITY: HAVE YOU HAD THOUGHTS OF HARMING ANYONE ELSE?: NO

## 2024-06-15 SDOH — SOCIAL STABILITY: SOCIAL INSECURITY: WERE YOU ABLE TO COMPLETE ALL THE BEHAVIORAL HEALTH SCREENINGS?: YES

## 2024-06-15 SDOH — SOCIAL STABILITY: SOCIAL INSECURITY: DOES ANYONE TRY TO KEEP YOU FROM HAVING/CONTACTING OTHER FRIENDS OR DOING THINGS OUTSIDE YOUR HOME?: NO

## 2024-06-15 SDOH — SOCIAL STABILITY: SOCIAL INSECURITY: ARE THERE ANY APPARENT SIGNS OF INJURIES/BEHAVIORS THAT COULD BE RELATED TO ABUSE/NEGLECT?: NO

## 2024-06-15 SDOH — SOCIAL STABILITY: SOCIAL INSECURITY: DO YOU FEEL UNSAFE GOING BACK TO THE PLACE WHERE YOU ARE LIVING?: NO

## 2024-06-15 SDOH — SOCIAL STABILITY: SOCIAL INSECURITY: DO YOU FEEL ANYONE HAS EXPLOITED OR TAKEN ADVANTAGE OF YOU FINANCIALLY OR OF YOUR PERSONAL PROPERTY?: NO

## 2024-06-15 SDOH — SOCIAL STABILITY: SOCIAL INSECURITY: ABUSE: ADULT

## 2024-06-15 SDOH — SOCIAL STABILITY: SOCIAL INSECURITY: HAS ANYONE EVER THREATENED TO HURT YOUR FAMILY OR YOUR PETS?: NO

## 2024-06-15 SDOH — SOCIAL STABILITY: SOCIAL INSECURITY: HAVE YOU HAD ANY THOUGHTS OF HARMING ANYONE ELSE?: NO

## 2024-06-15 SDOH — SOCIAL STABILITY: SOCIAL INSECURITY: ARE YOU OR HAVE YOU BEEN THREATENED OR ABUSED PHYSICALLY, EMOTIONALLY, OR SEXUALLY BY ANYONE?: NO

## 2024-06-15 ASSESSMENT — COGNITIVE AND FUNCTIONAL STATUS - GENERAL
TURNING FROM BACK TO SIDE WHILE IN FLAT BAD: A LITTLE
TOILETING: A LITTLE
PERSONAL GROOMING: A LITTLE
MOBILITY SCORE: 17
WALKING IN HOSPITAL ROOM: A LITTLE
TURNING FROM BACK TO SIDE WHILE IN FLAT BAD: A LITTLE
WALKING IN HOSPITAL ROOM: A LITTLE
DRESSING REGULAR LOWER BODY CLOTHING: A LITTLE
DRESSING REGULAR LOWER BODY CLOTHING: A LITTLE
WALKING IN HOSPITAL ROOM: A LITTLE
MOBILITY SCORE: 18
MOVING FROM LYING ON BACK TO SITTING ON SIDE OF FLAT BED WITH BEDRAILS: A LITTLE
STANDING UP FROM CHAIR USING ARMS: A LITTLE
DRESSING REGULAR UPPER BODY CLOTHING: A LITTLE
DAILY ACTIVITIY SCORE: 18
STANDING UP FROM CHAIR USING ARMS: A LITTLE
PERSONAL GROOMING: A LITTLE
MOVING FROM LYING ON BACK TO SITTING ON SIDE OF FLAT BED WITH BEDRAILS: A LITTLE
DAILY ACTIVITIY SCORE: 22
MOVING TO AND FROM BED TO CHAIR: A LITTLE
MOBILITY SCORE: 19
HELP NEEDED FOR BATHING: A LITTLE
TOILETING: A LITTLE
TURNING FROM BACK TO SIDE WHILE IN FLAT BAD: A LITTLE
HELP NEEDED FOR BATHING: A LITTLE
CLIMB 3 TO 5 STEPS WITH RAILING: A LITTLE
CLIMB 3 TO 5 STEPS WITH RAILING: A LOT
EATING MEALS: A LITTLE
MOVING TO AND FROM BED TO CHAIR: A LITTLE
DRESSING REGULAR UPPER BODY CLOTHING: A LITTLE
HELP NEEDED FOR BATHING: A LITTLE
EATING MEALS: A LITTLE
DAILY ACTIVITIY SCORE: 18
CLIMB 3 TO 5 STEPS WITH RAILING: A LITTLE
PATIENT BASELINE BEDBOUND: NO
MOVING TO AND FROM BED TO CHAIR: A LITTLE
DRESSING REGULAR LOWER BODY CLOTHING: A LITTLE
STANDING UP FROM CHAIR USING ARMS: A LITTLE

## 2024-06-15 ASSESSMENT — PAIN - FUNCTIONAL ASSESSMENT
PAIN_FUNCTIONAL_ASSESSMENT: 0-10

## 2024-06-15 ASSESSMENT — LIFESTYLE VARIABLES
VISUAL DISTURBANCES: NOT PRESENT
ORIENTATION AND CLOUDING OF SENSORIUM: ORIENTED AND CAN DO SERIAL ADDITIONS
HOW MANY STANDARD DRINKS CONTAINING ALCOHOL DO YOU HAVE ON A TYPICAL DAY: 3 OR 4
HOW OFTEN DO YOU HAVE 6 OR MORE DRINKS ON ONE OCCASION: MONTHLY
AUDITORY DISTURBANCES: NOT PRESENT
HEADACHE, FULLNESS IN HEAD: NOT PRESENT
TREMOR: NO TREMOR
NAUSEA AND VOMITING: NO NAUSEA AND NO VOMITING
ANXIETY: NO ANXIETY, AT EASE
SKIP TO QUESTIONS 9-10: 0
AGITATION: NORMAL ACTIVITY
AUDIT-C TOTAL SCORE: 7
PAROXYSMAL SWEATS: NO SWEAT VISIBLE
TOTAL SCORE: 0
AUDIT-C TOTAL SCORE: 7
HOW OFTEN DO YOU HAVE A DRINK CONTAINING ALCOHOL: 4 OR MORE TIMES A WEEK

## 2024-06-15 ASSESSMENT — ACTIVITIES OF DAILY LIVING (ADL)
LACK_OF_TRANSPORTATION: NO
BATHING: NEEDS ASSISTANCE
HEARING - LEFT EAR: DIFFICULTY WITH NOISE
TOILETING: NEEDS ASSISTANCE
WALKS IN HOME: NEEDS ASSISTANCE
GROOMING: NEEDS ASSISTANCE
JUDGMENT_ADEQUATE_SAFELY_COMPLETE_DAILY_ACTIVITIES: YES
FEEDING YOURSELF: NEEDS ASSISTANCE
DRESSING YOURSELF: NEEDS ASSISTANCE
PATIENT'S MEMORY ADEQUATE TO SAFELY COMPLETE DAILY ACTIVITIES?: YES
ADEQUATE_TO_COMPLETE_ADL: YES
HEARING - RIGHT EAR: DIFFICULTY WITH NOISE

## 2024-06-15 ASSESSMENT — PAIN SCALES - GENERAL
PAINLEVEL_OUTOF10: 0 - NO PAIN
PAINLEVEL_OUTOF10: 3

## 2024-06-15 ASSESSMENT — PATIENT HEALTH QUESTIONNAIRE - PHQ9
SUM OF ALL RESPONSES TO PHQ9 QUESTIONS 1 & 2: 0
1. LITTLE INTEREST OR PLEASURE IN DOING THINGS: NOT AT ALL
2. FEELING DOWN, DEPRESSED OR HOPELESS: NOT AT ALL

## 2024-06-15 NOTE — ED PROVIDER NOTES
HPI   Chief Complaint   Patient presents with    Leg Swelling    Shortness of Breath       HPI    74-year-old male with PMH HFrEF, CAD, afib not on AC, HTN, chronic hyponatremia, presenting due to increasing leg edema and shortness of breath.  Patient has had history of poor compliance with medications.  Stated he has been out of his diuretic for past 2 to 3 weeks.  Per his list, he is supposed to take spironolactone and bumex daily.  Patient reports worsening symptoms over the past few weeks gradually.  Dyspnea mostly with exertion and orthopnea.  No chest pain.  Does not use oxygen at home.  Reports leg swelling as well which has been equal.  No fevers or chills.  Mild cough with no phlegm.  No abdominal pain.  No nausea or vomiting.  No falls or trauma.                      Norris Coma Scale Score: 15                     Patient History   History reviewed. No pertinent past medical history.  History reviewed. No pertinent surgical history.  No family history on file.  Social History     Tobacco Use    Smoking status: Every Day     Types: Cigarettes    Smokeless tobacco: Never    Tobacco comments:     Smokes 6 cigarettes per day.   Substance Use Topics    Alcohol use: Yes     Comment: 2 cases of beer per week.    Drug use: Never       Physical Exam   ED Triage Vitals   Temp Heart Rate Resp BP   06/14/24 2040 06/14/24 2030 06/14/24 2030 06/14/24 2040   37 °C (98.6 °F) 73 (!) 28 106/54      SpO2 Temp Source Heart Rate Source Patient Position   06/14/24 2030 06/14/24 2040 06/14/24 2040 06/14/24 2040   95 % Temporal Monitor Lying      BP Location FiO2 (%)     06/14/24 2040 --     Right arm        Physical Exam  Vitals and nursing note reviewed.   Constitutional:       General: He is not in acute distress.     Appearance: Normal appearance. He is obese. He is not ill-appearing, toxic-appearing or diaphoretic.   HENT:      Head: Normocephalic and atraumatic.      Right Ear: External ear normal.      Left Ear: External  ear normal.      Nose: Nose normal. No rhinorrhea.      Mouth/Throat:      Mouth: Mucous membranes are moist.      Pharynx: Oropharynx is clear. No oropharyngeal exudate or posterior oropharyngeal erythema.   Eyes:      General: No scleral icterus.     Extraocular Movements: Extraocular movements intact.      Conjunctiva/sclera: Conjunctivae normal.      Pupils: Pupils are equal, round, and reactive to light.   Cardiovascular:      Rate and Rhythm: Normal rate and regular rhythm.      Pulses: Normal pulses.      Heart sounds: Normal heart sounds. No murmur heard.     No friction rub. No gallop.   Pulmonary:      Effort: Pulmonary effort is normal. No respiratory distress.      Breath sounds: No stridor. Rales (diffuse) present. No wheezing.   Chest:      Chest wall: No tenderness.   Abdominal:      General: There is no distension.      Palpations: Abdomen is soft.      Tenderness: There is no abdominal tenderness. There is no guarding or rebound.   Musculoskeletal:         General: No swelling, tenderness, deformity or signs of injury. Normal range of motion.      Right lower leg: Edema present.      Left lower leg: Edema present.      Comments: B/l erythema and significant edema   Skin:     General: Skin is warm and dry.      Capillary Refill: Capillary refill takes less than 2 seconds.      Coloration: Skin is not jaundiced.      Findings: No rash.   Neurological:      General: No focal deficit present.      Mental Status: He is alert and oriented to person, place, and time. Mental status is at baseline.      Cranial Nerves: No cranial nerve deficit.      Sensory: No sensory deficit.      Motor: No weakness.      Coordination: Coordination normal.      Gait: Gait normal.   Psychiatric:         Mood and Affect: Mood normal.         Behavior: Behavior normal.         Thought Content: Thought content normal.         Judgment: Judgment normal.         ED Course & MDM   Diagnoses as of 06/15/24 1259   Shortness of  breath   Acute on chronic congestive heart failure, unspecified heart failure type (Multi)   Hyponatremia       Medical Decision Making      74-year-old male with CHF presenting with shortness of breath and leg edema.  Symptoms consistent with fluid overload.  Has diffuse crackles.  Chest x-ray confirmed pulmonary edema.  Borderline oxygen saturation around 90%, placed on 2 L O2.  Does not normally wear oxygen.  Severe edema to both legs which are equal.  Few areas of weeping, but no wounds that appear infected.  Leg is erythematous, but likely related to edema and less likely to be cellulitic.  No fevers or chills.  Has been out of his home diuretics.  Also of note he has chronic hyponatremia, likely from diuretic usage.  Baseline sodium often high 120s to low 130s.  Sodium 127 today, similar to prior baseline.  EKG without any acute changes.  Other labs showed elevated troponin of 81, stable on repeat.  BNP 1700.  Troponin he likely related to CHF exacerbation, less likely to be ischemic event.  He has no chest pain at this time.  Although hyponatremic, he does need diuresis given his oxygen requirement and pulmonary edema.  Will give 40 of Lasix for now and diurese more slowly to avoid precipitous drop in sodium.  Patient admitted for further workup and management.    EKG independently interpreted by myself:  20:43 - sinus rhythm with occasional PAC, HR 74, borderline right axis, normal intervals, no ST elevations, T inv inferior leads unchanged from prior    22:54 -NSR, HR 70, right axis deviation, normal intervals, no ST elevations, T inv inferior leads unchanged from prior, artifact limits interpretation    Procedure  Procedures      MD Navid Thomas MD  06/15/24 3737

## 2024-06-15 NOTE — CARE PLAN
Pt admitted to room 2112. Alert and oriented. No complaints of pain, however pt is visibly SOB. Received breathing treatment per respiratory and given iv dose of diamox. On 4L NC. Safety maintained, will continue to monitor     Problem: Pain - Adult  Goal: Verbalizes/displays adequate comfort level or baseline comfort level  Outcome: Progressing     Problem: Safety - Adult  Goal: Free from fall injury  Outcome: Progressing     Problem: Discharge Planning  Goal: Discharge to home or other facility with appropriate resources  Outcome: Progressing     Problem: Chronic Conditions and Co-morbidities  Goal: Patient's chronic conditions and co-morbidity symptoms are monitored and maintained or improved  Outcome: Progressing     Problem: Pain  Goal: Turns in bed with improved pain control throughout the shift  Outcome: Progressing     Problem: Skin  Goal: Participates in plan/prevention/treatment measures  Outcome: Progressing     Problem: Respiratory  Goal: Minimal/no exertional discomfort or dyspnea this shift  Outcome: Progressing  Goal: No signs of respiratory distress (eg. Use of accessory muscles. Peds grunting)  Outcome: Progressing  Goal: Verbalize decreased shortness of breath this shift  Outcome: Progressing  Goal: Wean oxygen to maintain O2 saturation per order/standard this shift  Outcome: Progressing

## 2024-06-15 NOTE — H&P
History Of Present Illness  Mr. Lane is a 74-year-old male who presents to [facility] ED c/o ROWELL (unable to walk more than 15 feet on rollator) and worsening b/l LE edema for ~3 week duration. He lives in [facility] years.  Neighbor helps take him to appointments when necessary, otherwise lives alone.  States that he has been out of several medications for 2-3 weeks 2/2 him not calling for refills. He still has some medications at home, however is unsure which ones. Endorses chronic cough productive of clear/white phlegm not worsened outside of baseline. No sick contacts. Continues to smoke. Denies constitutional symptoms, angina, palpitations, abdominal pain, constipation, diarrhea, hematuria. Endorses he has an enlarged prostate and has frequency not outside of baseline.  Generally feels he empties his bladder.  Other ROS negative.    PMH: Chronic CAD, chronic ischemic cardiomyopathy (follows with Dr. Mora--stress test 1/2023: Large infarct involving inferior wall, apex, distal lateral wall. Moderate ischemia of the proximal and mid lateral wall. Resting EF 38% with post-rest ejection fraction 15 with transient post-rest cavity dilatation and a 3 times daily ratio of 1.4, pseudonormal diastolic filling pattern by echo), mild aortic stenosis, current tobacco abuse, aortic stenosis, PAF not on AC, HTN, ALBERTO, bipolar and PTSD, obesity, ?COPD (no outpatient PFTs despite recommended follow-up from last hospitalization)    **Per outpatient cardiology note 12/2024: Patient has true intolerance to ACE/ARBs and is not on these medications nor Entresto.  Has a h/o running out of medications and compliance has been questioned in the past. [facility]    Meds from last outpatient visit 12/24: ASA, Lipitor, Bumex 2 mg twice daily, Imdur, Lamictal, metoprolol succinate, spironolactone, trazodone, thiamine, folic acid, ferrous sulfate, Mag-Ox    ED vitals:  T98.6, P73, RR 28, 106/54, 95% on  2L NC    Labs:  No leukocytosis, neutrophil predominance of 5.68 without left shift.  Hgb 12.2 (13.6 in 1/2023).  PT/INR 13.8 and 1.2 respectively.  , CL 90, K4.  Mg 1.75.  Troponin 81, 83.  BNP 1,747 (last 1,200 in January 23).  EKG sinus rhythm with PACs, rightward axis, VR 74, QTc 461 without acute ischemic injury pattern.    Imaging:  CXR: Cardiomegaly, moderate pulmonary edema, mild bilateral effusions    Interventions in the ED: 40mg IV lasix    Past Medical History  See above    Surgical History  He has no past surgical history on file.     Social History  He reports that he has been smoking cigarettes. He has never used smokeless tobacco. He reports current alcohol use. He reports that he does not use drugs.    Family History  No family history on file.     Allergies  Losartan    Review of Systems  Negative as otherwise stated above  Physical Exam  VITALS: I have reviewed the vital signs.  Saturating 97 to 99% on 2 L NC (BL RA)  GENERAL: Well developed, obese, mildly disheveled adult in no acute distress.  Resting comfortably in bed.  NEURO: Alert and oriented x3, able to answer questions and follow commands. No motor or sensory deficits. Moves all extremities. Face is symmetric and expressive. No tremor.  EYES: PERRL. No scleral icterus or conjunctival injection. No discharge.   HENT: Normocephalic, atraumatic.  Significantly hard of hearing.  Nares grossly patent and without discharge. Mucous membranes moist.   NECK: No JVD. Patient moves neck without restriction.   CARDIO: Rhythm regular. Normal rate. 2/6 Systolic ejection murmur, rub, or gallop. Pulses equal bilaterally in the upper and lower extremity.  3+ bilateral pitting edema to the knee  PULM: Bilateral upper/lower posterior crackles.  No wheeze.  Moderate conversational dyspnea. No splinting, stridor, or accessory muscle use.    GI: Abdomen is soft, round and non-distended. No tenderness to palpation. No guarding or rigidity. Normoactive  bowel sounds. Hepatojugular reflex present  : No suprapubic tenderness. No CVA tenderness.  EXTREMITIES: Symmetric muscle bulk. No joint swelling. No clubbing, cyanosis, or deformity. Muscle strength 5/5 in b/l upper and lower extremities  SKIN: Warm and dry. Normal turgor. No rash or lesions appreciated.   PSYCH: Mood, affect, and interaction is appropriate to the setting. Not internally stimulated.  Last Recorded Vitals  /60   Pulse 67   Temp 37 °C (98.6 °F) (Temporal)   Resp (!) 24   Wt 96.4 kg (212 lb 8.4 oz)   SpO2 99%     Relevant Results    Scheduled medications  enoxaparin, 40 mg, subcutaneous, q24h  furosemide, 40 mg, intravenous, Once  [START ON 6/15/2024] polyethylene glycol, 17 g, oral, Daily      Continuous medications     PRN medications    Results for orders placed or performed during the hospital encounter of 06/14/24 (from the past 24 hour(s))   CBC with Differential   Result Value Ref Range    WBC 7.7 4.4 - 11.3 x10*3/uL    nRBC 0.0 0.0 - 0.0 /100 WBCs    RBC 3.79 (L) 4.50 - 5.90 x10*6/uL    Hemoglobin 12.2 (L) 13.5 - 17.5 g/dL    Hematocrit 36.7 (L) 41.0 - 52.0 %    MCV 97 80 - 100 fL    MCH 32.2 26.0 - 34.0 pg    MCHC 33.2 32.0 - 36.0 g/dL    RDW 13.3 11.5 - 14.5 %    Platelets 248 150 - 450 x10*3/uL    Neutrophils % 74.2 40.0 - 80.0 %    Immature Granulocytes %, Automated 0.4 0.0 - 0.9 %    Lymphocytes % 9.8 13.0 - 44.0 %    Monocytes % 15.0 2.0 - 10.0 %    Eosinophils % 0.1 0.0 - 6.0 %    Basophils % 0.5 0.0 - 2.0 %    Neutrophils Absolute 5.68 (H) 1.60 - 5.50 x10*3/uL    Immature Granulocytes Absolute, Automated 0.03 0.00 - 0.50 x10*3/uL    Lymphocytes Absolute 0.75 (L) 0.80 - 3.00 x10*3/uL    Monocytes Absolute 1.15 (H) 0.05 - 0.80 x10*3/uL    Eosinophils Absolute 0.01 0.00 - 0.40 x10*3/uL    Basophils Absolute 0.04 0.00 - 0.10 x10*3/uL   Comprehensive Metabolic Panel   Result Value Ref Range    Glucose 122 (H) 74 - 99 mg/dL    Sodium 127 (L) 136 - 145 mmol/L    Potassium 4.0 3.5  - 5.3 mmol/L    Chloride 90 (L) 98 - 107 mmol/L    Bicarbonate 29 21 - 32 mmol/L    Anion Gap 12 mmol/L    Urea Nitrogen 10 6 - 23 mg/dL    Creatinine 0.69 0.50 - 1.30 mg/dL    eGFR >90 >60 mL/min/1.73m*2    Calcium 8.8 8.6 - 10.3 mg/dL    Albumin 3.8 3.4 - 5.0 g/dL    Alkaline Phosphatase 75 33 - 136 U/L    Total Protein 7.0 6.4 - 8.2 g/dL    AST 17 9 - 39 U/L    Bilirubin, Total 0.7 0.0 - 1.2 mg/dL    ALT 10 10 - 52 U/L   Brain Natriuretic Peptide   Result Value Ref Range    BNP 1,747 (H) 0 - 99 pg/mL   Protime-INR   Result Value Ref Range    Protime 13.8 (H) 9.8 - 12.8 seconds    INR 1.2 (H) 0.9 - 1.1   Troponin I, High Sensitivity, Initial   Result Value Ref Range    Troponin I, High Sensitivity 81 (HH) 0 - 20 ng/L   Troponin, High Sensitivity, 1 Hour   Result Value Ref Range    Troponin I, High Sensitivity 83 (HH) 0 - 20 ng/L   Magnesium   Result Value Ref Range    Magnesium 1.75 1.60 - 2.40 mg/dL     XR chest 1 view    Result Date: 6/14/2024  Interpreted By:  Abhijeet Stone, STUDY: XR CHEST 1 VIEW;  6/14/2024 9:15 pm   INDICATION: Signs/Symptoms:Chest Pain.   COMPARISON: 01/12/2023   ACCESSION NUMBER(S): [id number]   ORDERING CLINICIAN: LONI CARMICHAEL   FINDINGS: There is enlargement cardiac silhouette with moderate pulmonary edema. Bilateral perihilar and bibasilar airspace disease present. Bilateral effusions.       1.  Enlarged cardiac silhouette with moderate pulmonary edema. Superimposed pneumonia at the bases difficult to exclude     Assessment/Plan   Principal Problem:    Shortness of breath  74-year-old male presenting with findings consistent of CHFe 2/2 medication nonadherence. Receiving IV diuresis. Presents with acute hyponatremia, for which etiology likely hypervolemic with urine studies pending.    # A/c mixed CHF 2/2 medication nonadherence  # ROWELL and mild hypoxia 2/2 above  # NSTEMI, likely type II demand   # CAD   # Tobacco abuse  -Ddimer 5077. Troponin 81, 83.  TSH WNL.  EKG  "non-ischemic  -CXR: cardiomegaly, pulmonary vascular congestion and mild b/l effusions  -s/p 40mg IV lasix in ED. Home diuretics of aldactone and bumex per 12/24 outpatient records  -Fluid restriction of 1200 cc.  Ace wrap legs.  Strict I's and O's  -last echo as detailed above.  No overt indication to repeat echo given etiology most likely 2/2 medication noncompliance    #Acute hyponatremia, likely hypervolemic  # History of mild hyponatremia  -Na 127 on admission  -pending urine lytes and osms  -q4 hr rfp in acute setting with 4-6 meq/24hr correction    # Non-rheumatic AS  # Paroxysmal A-fib not on AC  # ?COPD  # HTN  # ALBERTO  # Bipolar with PTSD  # Morbid obesity  -Chronic conditions stable at this time  -Continue meds pending med rec    CODE STATUS: DNR CCA  DVT prophylaxis: Lovenox, SCDs  Diet: Cardiac, 2 to 3 g of sodium, fluid restriction of 1200 cc  Telemetry: Yes  Consults: PT OT    To be staffed with attending physician    Scotty Jacob, DO  PGY-2 Internal Medicine    Dayteam Addendum:  In short, this is a 75 y/o M with PMHx significant for CAD, chronic ischemic cardiomyopathy, pAF not on AC, tobacco abuse, alcohol abuse, who is presenting with progressively worsening dyspnea on exertion over the past 2-3 weeks. He lives at an [facility] for the past several years and has not been taking his medications for the past couple of weeks because [facility]. Suspect that this is CHF exacerbation in the setting of medication noncompliance as well as dietary indiscretion. He endorses drinking \"2 cases of beer, which is 48 beers over 7 days\" every week. Will continue fluid restriction and low sodium diet, and continue previously prescribed Bumex 2 mg BID and aldactone 12.5 mg daily to assess volume status on home diuretic regiment. ECHO ordered to assess heart function. Will recheck afternoon RFP to assess hyponatremia which appears chronic in nature. Given history of beer " drinking, this may be due in part to beer potomania. Also given history of beer drinking, checked ethanol level which was <10, order vitamins, and ask nursing to assess CIWA scores prior to administration of full alcohol withdrawal medications. Nicotine patches ordered to assist with tobacco cessation. Incentive spirometer ordered. Unclear why the patient is not on anticoagulation given his history of paroxysmal afib, however he appears to have not been on AC since 1/8/2023.  -Saad Espino, DO PGY-1

## 2024-06-16 ENCOUNTER — APPOINTMENT (OUTPATIENT)
Dept: CARDIOLOGY | Facility: HOSPITAL | Age: 74
End: 2024-06-16
Payer: MEDICARE

## 2024-06-16 VITALS
OXYGEN SATURATION: 99 % | SYSTOLIC BLOOD PRESSURE: 119 MMHG | BODY MASS INDEX: 31.02 KG/M2 | DIASTOLIC BLOOD PRESSURE: 57 MMHG | WEIGHT: 209.44 LBS | TEMPERATURE: 98.8 F | HEART RATE: 74 BPM | HEIGHT: 69 IN | RESPIRATION RATE: 24 BRPM

## 2024-06-16 LAB
ALBUMIN SERPL BCP-MCNC: 3.6 G/DL (ref 3.4–5)
ANION GAP SERPL CALC-SCNC: 13 MMOL/L (ref 10–20)
BODY SURFACE AREA: 2.15 M2
BUN SERPL-MCNC: 20 MG/DL (ref 6–23)
CALCIUM SERPL-MCNC: 8.9 MG/DL (ref 8.6–10.3)
CHLORIDE SERPL-SCNC: 90 MMOL/L (ref 98–107)
CO2 SERPL-SCNC: 31 MMOL/L (ref 21–32)
CREAT SERPL-MCNC: 0.92 MG/DL (ref 0.5–1.3)
EGFRCR SERPLBLD CKD-EPI 2021: 86 ML/MIN/1.73M*2
ERYTHROCYTE [DISTWIDTH] IN BLOOD BY AUTOMATED COUNT: 13.5 % (ref 11.5–14.5)
GLUCOSE SERPL-MCNC: 92 MG/DL (ref 74–99)
HCT VFR BLD AUTO: 38.6 % (ref 41–52)
HGB BLD-MCNC: 12 G/DL (ref 13.5–17.5)
MAGNESIUM SERPL-MCNC: 2.13 MG/DL (ref 1.6–2.4)
MCH RBC QN AUTO: 31.9 PG (ref 26–34)
MCHC RBC AUTO-ENTMCNC: 31.1 G/DL (ref 32–36)
MCV RBC AUTO: 103 FL (ref 80–100)
NRBC BLD-RTO: 0 /100 WBCS (ref 0–0)
PHOSPHATE SERPL-MCNC: 5.8 MG/DL (ref 2.5–4.9)
PLATELET # BLD AUTO: 237 X10*3/UL (ref 150–450)
POTASSIUM SERPL-SCNC: 3.9 MMOL/L (ref 3.5–5.3)
RBC # BLD AUTO: 3.76 X10*6/UL (ref 4.5–5.9)
SODIUM SERPL-SCNC: 130 MMOL/L (ref 136–145)
WBC # BLD AUTO: 6.3 X10*3/UL (ref 4.4–11.3)

## 2024-06-16 PROCEDURE — 99233 SBSQ HOSP IP/OBS HIGH 50: CPT

## 2024-06-16 PROCEDURE — 36415 COLL VENOUS BLD VENIPUNCTURE: CPT

## 2024-06-16 PROCEDURE — 85027 COMPLETE CBC AUTOMATED: CPT

## 2024-06-16 PROCEDURE — 93005 ELECTROCARDIOGRAM TRACING: CPT

## 2024-06-16 PROCEDURE — 80069 RENAL FUNCTION PANEL: CPT

## 2024-06-16 PROCEDURE — S4991 NICOTINE PATCH NONLEGEND: HCPCS

## 2024-06-16 PROCEDURE — 94667 MNPJ CHEST WALL 1ST: CPT

## 2024-06-16 PROCEDURE — 2500000002 HC RX 250 W HCPCS SELF ADMINISTERED DRUGS (ALT 637 FOR MEDICARE OP, ALT 636 FOR OP/ED): Mod: MUE | Performed by: INTERNAL MEDICINE

## 2024-06-16 PROCEDURE — 83735 ASSAY OF MAGNESIUM: CPT

## 2024-06-16 PROCEDURE — 2500000001 HC RX 250 WO HCPCS SELF ADMINISTERED DRUGS (ALT 637 FOR MEDICARE OP)

## 2024-06-16 PROCEDURE — 94640 AIRWAY INHALATION TREATMENT: CPT | Mod: MUE

## 2024-06-16 PROCEDURE — 2500000005 HC RX 250 GENERAL PHARMACY W/O HCPCS: Performed by: INTERNAL MEDICINE

## 2024-06-16 PROCEDURE — 1200000002 HC GENERAL ROOM WITH TELEMETRY DAILY

## 2024-06-16 PROCEDURE — 2500000004 HC RX 250 GENERAL PHARMACY W/ HCPCS (ALT 636 FOR OP/ED)

## 2024-06-16 PROCEDURE — 2500000002 HC RX 250 W HCPCS SELF ADMINISTERED DRUGS (ALT 637 FOR MEDICARE OP, ALT 636 FOR OP/ED)

## 2024-06-16 PROCEDURE — 93306 TTE W/DOPPLER COMPLETE: CPT

## 2024-06-16 RX ORDER — ACETAMINOPHEN 325 MG/1
650 TABLET ORAL EVERY 6 HOURS PRN
Status: DISCONTINUED | OUTPATIENT
Start: 2024-06-16 | End: 2024-06-19 | Stop reason: HOSPADM

## 2024-06-16 RX ADMIN — BUMETANIDE 2 MG: 1 TABLET ORAL at 16:58

## 2024-06-16 RX ADMIN — FOLIC ACID 1 MG: 1 TABLET ORAL at 08:09

## 2024-06-16 RX ADMIN — BUMETANIDE 2 MG: 1 TABLET ORAL at 08:09

## 2024-06-16 RX ADMIN — IPRATROPIUM BROMIDE AND ALBUTEROL SULFATE 3 ML: 2.5; .5 SOLUTION RESPIRATORY (INHALATION) at 17:13

## 2024-06-16 RX ADMIN — LAMOTRIGINE 100 MG: 100 TABLET ORAL at 08:09

## 2024-06-16 RX ADMIN — ATORVASTATIN CALCIUM 20 MG: 20 TABLET, FILM COATED ORAL at 21:05

## 2024-06-16 RX ADMIN — IPRATROPIUM BROMIDE AND ALBUTEROL SULFATE 3 ML: 2.5; .5 SOLUTION RESPIRATORY (INHALATION) at 13:01

## 2024-06-16 RX ADMIN — THIAMINE HCL TAB 100 MG 100 MG: 100 TAB at 08:09

## 2024-06-16 RX ADMIN — NICOTINE 1 PATCH: 21 PATCH, EXTENDED RELEASE TRANSDERMAL at 08:09

## 2024-06-16 RX ADMIN — Medication 1 TABLET: at 08:09

## 2024-06-16 RX ADMIN — IPRATROPIUM BROMIDE AND ALBUTEROL SULFATE 3 ML: 2.5; .5 SOLUTION RESPIRATORY (INHALATION) at 20:14

## 2024-06-16 RX ADMIN — POLYETHYLENE GLYCOL 3350 17 G: 17 POWDER, FOR SOLUTION ORAL at 08:08

## 2024-06-16 RX ADMIN — ACETAMINOPHEN 650 MG: 325 TABLET ORAL at 09:30

## 2024-06-16 RX ADMIN — EMPAGLIFLOZIN 12.5 MG: 25 TABLET, FILM COATED ORAL at 08:09

## 2024-06-16 RX ADMIN — Medication 4 L/MIN: at 17:13

## 2024-06-16 RX ADMIN — ENOXAPARIN SODIUM 40 MG: 40 INJECTION SUBCUTANEOUS at 08:08

## 2024-06-16 RX ADMIN — IPRATROPIUM BROMIDE AND ALBUTEROL SULFATE 3 ML: 2.5; .5 SOLUTION RESPIRATORY (INHALATION) at 09:22

## 2024-06-16 RX ADMIN — METOPROLOL SUCCINATE 25 MG: 25 TABLET, EXTENDED RELEASE ORAL at 08:09

## 2024-06-16 RX ADMIN — Medication 3 L/MIN: at 13:01

## 2024-06-16 RX ADMIN — SPIRONOLACTONE 12.5 MG: 25 TABLET ORAL at 08:09

## 2024-06-16 RX ADMIN — ISOSORBIDE MONONITRATE 15 MG: 30 TABLET, EXTENDED RELEASE ORAL at 08:09

## 2024-06-16 ASSESSMENT — COGNITIVE AND FUNCTIONAL STATUS - GENERAL
HELP NEEDED FOR BATHING: A LITTLE
MOVING TO AND FROM BED TO CHAIR: A LITTLE
WALKING IN HOSPITAL ROOM: A LITTLE
MOVING TO AND FROM BED TO CHAIR: A LITTLE
DRESSING REGULAR LOWER BODY CLOTHING: A LITTLE
DAILY ACTIVITIY SCORE: 21
PERSONAL GROOMING: A LITTLE
TOILETING: A LITTLE
DRESSING REGULAR UPPER BODY CLOTHING: A LITTLE
TURNING FROM BACK TO SIDE WHILE IN FLAT BAD: A LITTLE
MOBILITY SCORE: 21
DRESSING REGULAR UPPER BODY CLOTHING: A LITTLE
WALKING IN HOSPITAL ROOM: A LITTLE
HELP NEEDED FOR BATHING: A LITTLE
MOVING FROM LYING ON BACK TO SITTING ON SIDE OF FLAT BED WITH BEDRAILS: A LITTLE
DAILY ACTIVITIY SCORE: 18
DRESSING REGULAR LOWER BODY CLOTHING: A LITTLE
MOVING TO AND FROM BED TO CHAIR: A LITTLE
TOILETING: A LITTLE
STANDING UP FROM CHAIR USING ARMS: A LITTLE
MOBILITY SCORE: 18
TOILETING: A LITTLE
CLIMB 3 TO 5 STEPS WITH RAILING: A LITTLE
PERSONAL GROOMING: A LITTLE
STANDING UP FROM CHAIR USING ARMS: A LITTLE
HELP NEEDED FOR BATHING: A LITTLE
MOBILITY SCORE: 20
CLIMB 3 TO 5 STEPS WITH RAILING: A LITTLE
DAILY ACTIVITIY SCORE: 19
CLIMB 3 TO 5 STEPS WITH RAILING: A LITTLE
WALKING IN HOSPITAL ROOM: A LITTLE
DRESSING REGULAR LOWER BODY CLOTHING: A LITTLE
EATING MEALS: A LITTLE

## 2024-06-16 ASSESSMENT — PAIN - FUNCTIONAL ASSESSMENT
PAIN_FUNCTIONAL_ASSESSMENT: 0-10

## 2024-06-16 ASSESSMENT — PAIN SCALES - GENERAL
PAINLEVEL_OUTOF10: 0 - NO PAIN
PAINLEVEL_OUTOF10: 3
PAINLEVEL_OUTOF10: 0 - NO PAIN

## 2024-06-16 ASSESSMENT — PAIN DESCRIPTION - LOCATION: LOCATION: HEAD

## 2024-06-16 NOTE — PROGRESS NOTES
Milton Lane is a 74 y.o. male on day 1 of admission presenting with Acute on chronic combined systolic (congestive) and diastolic (congestive) heart failure (Multi).    Subjective   NAEON. Patient seen and evaluated at bedside. He reports that his breathing has improved. He is diuresing well and was net negative 2155 mL yesterday. No chest pain, shortness of breath on 3 L NC, fevers, chills, or other acute complaints.    Objective     Last Recorded Vitals  /52   Pulse 68   Temp 36.6 °C (97.9 °F)   Resp 20   Wt 95 kg (209 lb 7 oz)   SpO2 93%   Intake/Output last 3 Shifts:    Intake/Output Summary (Last 24 hours) at 6/16/2024 0835  Last data filed at 6/16/2024 0800  Gross per 24 hour   Intake 1100 ml   Output 1950 ml   Net -850 ml     Admission Weight  Weight: 96.4 kg (212 lb 8.4 oz) (06/14/24 2040)    Daily Weight  06/16/24 : 95 kg (209 lb 7 oz)    Image Results  ECG 12 lead  Normal sinus rhythm with sinus arrhythmia  Rightward axis  Possible Inferior infarct (cited on or before 25-JUL-2021)  Abnormal ECG  When compared with ECG of 14-JUN-2024 20:43, (unconfirmed)  Premature atrial complexes are no longer Present  ECG 12 lead  Sinus rhythm with Premature atrial complexes  Rightward axis  Possible Inferior infarct (cited on or before 25-JUL-2021)  Abnormal ECG  When compared with ECG of 15-VICK-2023 20:50,  No significant change was found    Physical Exam  Constitutional:       General: He is not in acute distress.     Appearance: He is obese. He is not toxic-appearing.   HENT:      Mouth/Throat:      Mouth: Mucous membranes are moist.      Pharynx: No posterior oropharyngeal erythema.   Eyes:      General: No scleral icterus.     Extraocular Movements: Extraocular movements intact.   Cardiovascular:      Rate and Rhythm: Normal rate and regular rhythm.      Pulses: Normal pulses.      Heart sounds: Murmur (systolic) heard.      No gallop.   Pulmonary:      Breath sounds: No wheezing.      Comments:  Crackles in the bilateral lung bases  Abdominal:      General: Abdomen is flat. There is no distension.      Palpations: Abdomen is soft.      Tenderness: There is no abdominal tenderness. There is no guarding.   Musculoskeletal:         General: Swelling (2+ bilateral lower extremity pitting edema) present. No tenderness.   Skin:     General: Skin is warm and dry.      Comments: Redness of the bilateral lower extremities, likely due to venous stasis   Neurological:      Mental Status: He is alert and oriented to person, place, and time.      Motor: No weakness.   Psychiatric:         Mood and Affect: Mood normal.     Relevant Results  Scheduled medications  atorvastatin, 20 mg, oral, Daily  bumetanide, 2 mg, oral, BID  empagliflozin, 12.5 mg, oral, Daily  enoxaparin, 40 mg, subcutaneous, q24h  folic acid, 1 mg, oral, Daily  ipratropium-albuteroL, 3 mL, nebulization, 4x daily  isosorbide mononitrate ER, 15 mg, oral, Daily  lamoTRIgine, 100 mg, oral, Daily  metoprolol succinate XL, 25 mg, oral, Daily  multivitamin with minerals, 1 tablet, oral, Daily  nicotine, 1 patch, transdermal, Daily   Followed by  [START ON 7/27/2024] nicotine, 1 patch, transdermal, Daily   Followed by  [START ON 8/10/2024] nicotine, 1 patch, transdermal, Daily  polyethylene glycol, 17 g, oral, Daily  spironolactone, 12.5 mg, oral, Daily  thiamine, 100 mg, oral, Daily    Continuous medications     PRN medications  PRN medications: acetaminophen, ipratropium-albuteroL, oxygen  Results for orders placed or performed during the hospital encounter of 06/14/24 (from the past 24 hour(s))   Renal function panel   Result Value Ref Range    Glucose 104 (H) 74 - 99 mg/dL    Sodium 127 (L) 136 - 145 mmol/L    Potassium 4.2 3.5 - 5.3 mmol/L    Chloride 91 (L) 98 - 107 mmol/L    Bicarbonate 31 21 - 32 mmol/L    Anion Gap 9 (L) 10 - 20 mmol/L    Urea Nitrogen 14 6 - 23 mg/dL    Creatinine 0.73 0.50 - 1.30 mg/dL    eGFR >90 >60 mL/min/1.73m*2    Calcium 9.2  8.6 - 10.3 mg/dL    Phosphorus 5.0 (H) 2.5 - 4.9 mg/dL    Albumin 3.8 3.4 - 5.0 g/dL   CBC   Result Value Ref Range    WBC 6.3 4.4 - 11.3 x10*3/uL    nRBC 0.0 0.0 - 0.0 /100 WBCs    RBC 3.76 (L) 4.50 - 5.90 x10*6/uL    Hemoglobin 12.0 (L) 13.5 - 17.5 g/dL    Hematocrit 38.6 (L) 41.0 - 52.0 %     (H) 80 - 100 fL    MCH 31.9 26.0 - 34.0 pg    MCHC 31.1 (L) 32.0 - 36.0 g/dL    RDW 13.5 11.5 - 14.5 %    Platelets 237 150 - 450 x10*3/uL   Magnesium   Result Value Ref Range    Magnesium 2.13 1.60 - 2.40 mg/dL   Renal Function Panel   Result Value Ref Range    Glucose 92 74 - 99 mg/dL    Sodium 130 (L) 136 - 145 mmol/L    Potassium 3.9 3.5 - 5.3 mmol/L    Chloride 90 (L) 98 - 107 mmol/L    Bicarbonate 31 21 - 32 mmol/L    Anion Gap 13 10 - 20 mmol/L    Urea Nitrogen 20 6 - 23 mg/dL    Creatinine 0.92 0.50 - 1.30 mg/dL    eGFR 86 >60 mL/min/1.73m*2    Calcium 8.9 8.6 - 10.3 mg/dL    Phosphorus 5.8 (H) 2.5 - 4.9 mg/dL    Albumin 3.6 3.4 - 5.0 g/dL   Transthoracic Echo (TTE) Complete   Result Value Ref Range    BSA 2.15 m2     ECG 12 lead    Result Date: 6/15/2024  Normal sinus rhythm with sinus arrhythmia Rightward axis Possible Inferior infarct (cited on or before 25-JUL-2021) Abnormal ECG When compared with ECG of 14-JUN-2024 20:43, (unconfirmed) Premature atrial complexes are no longer Present    ECG 12 lead    Result Date: 6/15/2024  Sinus rhythm with Premature atrial complexes Rightward axis Possible Inferior infarct (cited on or before 25-JUL-2021) Abnormal ECG When compared with ECG of 15-VICK-2023 20:50, No significant change was found    XR chest 1 view    Result Date: 6/14/2024  Interpreted By:  Abhijeet Stone, STUDY: XR CHEST 1 VIEW;  6/14/2024 9:15 pm   INDICATION: Signs/Symptoms:Chest Pain.   COMPARISON: 01/12/2023   ACCESSION NUMBER(S): [id number]   ORDERING CLINICIAN: LONI CARMICHAEL   FINDINGS: There is enlargement cardiac silhouette with moderate pulmonary edema. Bilateral perihilar and bibasilar  airspace disease present. Bilateral effusions.       1.  Enlarged cardiac silhouette with moderate pulmonary edema. Superimposed pneumonia at the bases difficult to exclude       MACRO: None   Signed by: Abhijeet Stone 6/14/2024 9:50 PM Dictation workstation:   [id number]     Assessment/Plan   Principal Problem:    Acute on chronic combined systolic (congestive) and diastolic (congestive) heart failure (Multi)  Active Problems:    Shortness of breath    Patient is a 75 y/o M with PMHx significant for chronic CAD, chronic ischemic cardiomyopathy, mild aortic stenosis, tobacco abuse, PAF not on AC, HTN, ALBERTO, bipolar, PTSD, and obesity who initially presented with progressive dyspnea on exertion. Suspect that this is CHF exacerbation in the setting on medication non adherence, with possible component of dietary indiscretions. Diuresing well on home Bumex and aldactone, however still clinically hypervolemic. Hyponatremia was present, however this appears chronic and likely is partly due to hypervolemia and beer potomania.     # Acute exacerbation of  CHF 2/2 medication nonadherence  # ROWELL  # CAD   # Tobacco abuse with possible underlying COPD  -BNP 1747  -CXR showing enlarged cardiac silhouette with moderate pulmonary edema  -ECHO ordered to reassess cardiac function  -S/p 40mg IV lasix in ED  -Continue home Bumex 2 mg BID and aldactone 12.5 daily  -Nicotine patches  -q4 duonebs with q2 duonebs as needed  -Fluid restriction of 1200 ml  -Continue supplemental oxygen to maintain oxygen saturation > 90%, wean as tolerated. Currently on 3 L NC.  -Strict I's and O's  -Incentive spirometer  -Chest physio therapy  -Would benefit from outpatient PFTs if not already performed, as well as continued follow up with Cardiology     # Hyponatremia likely 2/2 hypervolemia vs. Beer potomania  # History of mild hyponatremia  # Alcohol abuse  Patient is chronically hyponatremic in the high 120 low 130 range, he has significant beer  consumption and medication noncompliance with CHF. Suspect that this is due to combination of hypervolemic state and beer potomania. Serum osmolality being low supports hypervolemic etiology.  -Na 127 on admission, asymptomatic  -Folic acid, thiamine, and multivitamin ordered  -Discussed risks of continued alcohol abuse, patient has no interest in quitting  -Will monitor for signs of alcohol withdrawal  -Trend daily RFP, avoid overcorrection of Na with goal correct 6-8 mEq in any 24 hour period of time    # NSTEMI, likely type II demand   -Troponin 81 -> 83, EKG without obvious ischemic changes. Suspect that this is likely supply demand mismatch due to exacerbation of heart failure.    # Paroxysmal A-fib not on AC  Per records Afib was a one time occurrence without remittance, triggered potentially by alcohol. Given that there has not been recorded arrhythmia since, will hold AC at this time.     # Non-rheumatic AS  # HTN  # ALBERTO  # Bipolar with PTSD  -Continue home medications and management as appropriate     Diet: Cardiac, 2 to 3 g of sodium, fluid restriction of 1200 cc  Consults: PT OT  DVT: Lovenox, SCDs    Dispo: Diuresing well, anticipate discharge in 1-2 midnights if there is improvement in oxygen saturation with diuresis.    The assessment and plan were discussed with the attending physician,  Saad Espino,  PGY-1

## 2024-06-16 NOTE — NURSING NOTE
Pt remained safe throughout shift. Vitals stable. Sp02 maintained>92% on 3L NC. No complaints of pain.

## 2024-06-17 LAB
ALBUMIN SERPL BCP-MCNC: 3.5 G/DL (ref 3.4–5)
ANION GAP SERPL CALC-SCNC: 11 MMOL/L (ref 10–20)
ATRIAL RATE: 75 BPM
BUN SERPL-MCNC: 23 MG/DL (ref 6–23)
CALCIUM SERPL-MCNC: 8.5 MG/DL (ref 8.6–10.3)
CHLORIDE SERPL-SCNC: 92 MMOL/L (ref 98–107)
CO2 SERPL-SCNC: 32 MMOL/L (ref 21–32)
CREAT SERPL-MCNC: 0.85 MG/DL (ref 0.5–1.3)
EGFRCR SERPLBLD CKD-EPI 2021: 90 ML/MIN/1.73M*2
ERYTHROCYTE [DISTWIDTH] IN BLOOD BY AUTOMATED COUNT: 13.3 % (ref 11.5–14.5)
GLUCOSE SERPL-MCNC: 92 MG/DL (ref 74–99)
HCT VFR BLD AUTO: 35.2 % (ref 41–52)
HGB BLD-MCNC: 11.2 G/DL (ref 13.5–17.5)
MAGNESIUM SERPL-MCNC: 1.81 MG/DL (ref 1.6–2.4)
MCH RBC QN AUTO: 32.3 PG (ref 26–34)
MCHC RBC AUTO-ENTMCNC: 31.8 G/DL (ref 32–36)
MCV RBC AUTO: 101 FL (ref 80–100)
NRBC BLD-RTO: 0 /100 WBCS (ref 0–0)
P AXIS: 38 DEGREES
P OFFSET: 172 MS
P ONSET: 122 MS
PHOSPHATE SERPL-MCNC: 3.9 MG/DL (ref 2.5–4.9)
PLATELET # BLD AUTO: 237 X10*3/UL (ref 150–450)
POTASSIUM SERPL-SCNC: 3.8 MMOL/L (ref 3.5–5.3)
PR INTERVAL: 180 MS
Q ONSET: 212 MS
QRS COUNT: 13 BEATS
QRS DURATION: 102 MS
QT INTERVAL: 418 MS
QTC CALCULATION(BAZETT): 466 MS
QTC FREDERICIA: 450 MS
R AXIS: 81 DEGREES
RBC # BLD AUTO: 3.47 X10*6/UL (ref 4.5–5.9)
SODIUM SERPL-SCNC: 131 MMOL/L (ref 136–145)
T AXIS: -52 DEGREES
T OFFSET: 421 MS
VENTRICULAR RATE: 75 BPM
WBC # BLD AUTO: 6.7 X10*3/UL (ref 4.4–11.3)

## 2024-06-17 PROCEDURE — 97530 THERAPEUTIC ACTIVITIES: CPT | Mod: GP

## 2024-06-17 PROCEDURE — 2500000001 HC RX 250 WO HCPCS SELF ADMINISTERED DRUGS (ALT 637 FOR MEDICARE OP)

## 2024-06-17 PROCEDURE — 97161 PT EVAL LOW COMPLEX 20 MIN: CPT | Mod: GP

## 2024-06-17 PROCEDURE — 1200000002 HC GENERAL ROOM WITH TELEMETRY DAILY

## 2024-06-17 PROCEDURE — 85027 COMPLETE CBC AUTOMATED: CPT

## 2024-06-17 PROCEDURE — 36415 COLL VENOUS BLD VENIPUNCTURE: CPT

## 2024-06-17 PROCEDURE — 2500000005 HC RX 250 GENERAL PHARMACY W/O HCPCS: Performed by: INTERNAL MEDICINE

## 2024-06-17 PROCEDURE — 2500000004 HC RX 250 GENERAL PHARMACY W/ HCPCS (ALT 636 FOR OP/ED)

## 2024-06-17 PROCEDURE — 99233 SBSQ HOSP IP/OBS HIGH 50: CPT

## 2024-06-17 PROCEDURE — 2500000002 HC RX 250 W HCPCS SELF ADMINISTERED DRUGS (ALT 637 FOR MEDICARE OP, ALT 636 FOR OP/ED): Performed by: INTERNAL MEDICINE

## 2024-06-17 PROCEDURE — 2500000002 HC RX 250 W HCPCS SELF ADMINISTERED DRUGS (ALT 637 FOR MEDICARE OP, ALT 636 FOR OP/ED)

## 2024-06-17 PROCEDURE — 94640 AIRWAY INHALATION TREATMENT: CPT | Mod: MUE

## 2024-06-17 PROCEDURE — 80069 RENAL FUNCTION PANEL: CPT

## 2024-06-17 PROCEDURE — S4991 NICOTINE PATCH NONLEGEND: HCPCS

## 2024-06-17 PROCEDURE — 83735 ASSAY OF MAGNESIUM: CPT

## 2024-06-17 RX ORDER — BUMETANIDE 1 MG/1
1 TABLET ORAL ONCE
Status: COMPLETED | OUTPATIENT
Start: 2024-06-17 | End: 2024-06-17

## 2024-06-17 RX ORDER — BUMETANIDE 1 MG/1
3 TABLET ORAL
Status: DISCONTINUED | OUTPATIENT
Start: 2024-06-17 | End: 2024-06-19 | Stop reason: HOSPADM

## 2024-06-17 RX ADMIN — THIAMINE HCL TAB 100 MG 100 MG: 100 TAB at 09:00

## 2024-06-17 RX ADMIN — SPIRONOLACTONE 12.5 MG: 25 TABLET ORAL at 09:00

## 2024-06-17 RX ADMIN — LAMOTRIGINE 100 MG: 100 TABLET ORAL at 08:59

## 2024-06-17 RX ADMIN — IPRATROPIUM BROMIDE AND ALBUTEROL SULFATE 3 ML: 2.5; .5 SOLUTION RESPIRATORY (INHALATION) at 21:28

## 2024-06-17 RX ADMIN — ISOSORBIDE MONONITRATE 15 MG: 30 TABLET, EXTENDED RELEASE ORAL at 08:58

## 2024-06-17 RX ADMIN — BUMETANIDE 2 MG: 1 TABLET ORAL at 08:58

## 2024-06-17 RX ADMIN — ATORVASTATIN CALCIUM 20 MG: 20 TABLET, FILM COATED ORAL at 21:03

## 2024-06-17 RX ADMIN — POLYETHYLENE GLYCOL 3350 17 G: 17 POWDER, FOR SOLUTION ORAL at 09:00

## 2024-06-17 RX ADMIN — IPRATROPIUM BROMIDE AND ALBUTEROL SULFATE 3 ML: 2.5; .5 SOLUTION RESPIRATORY (INHALATION) at 08:37

## 2024-06-17 RX ADMIN — ENOXAPARIN SODIUM 40 MG: 40 INJECTION SUBCUTANEOUS at 09:00

## 2024-06-17 RX ADMIN — Medication 2 L/MIN: at 21:31

## 2024-06-17 RX ADMIN — Medication 1 TABLET: at 08:59

## 2024-06-17 RX ADMIN — FOLIC ACID 1 MG: 1 TABLET ORAL at 08:59

## 2024-06-17 RX ADMIN — NICOTINE 1 PATCH: 21 PATCH, EXTENDED RELEASE TRANSDERMAL at 09:08

## 2024-06-17 RX ADMIN — EMPAGLIFLOZIN 12.5 MG: 25 TABLET, FILM COATED ORAL at 08:59

## 2024-06-17 RX ADMIN — BUMETANIDE 1 MG: 1 TABLET ORAL at 11:12

## 2024-06-17 RX ADMIN — BUMETANIDE 3 MG: 1 TABLET ORAL at 18:08

## 2024-06-17 RX ADMIN — IPRATROPIUM BROMIDE AND ALBUTEROL SULFATE 3 ML: 2.5; .5 SOLUTION RESPIRATORY (INHALATION) at 12:46

## 2024-06-17 RX ADMIN — METOPROLOL SUCCINATE 25 MG: 25 TABLET, EXTENDED RELEASE ORAL at 09:00

## 2024-06-17 SDOH — ECONOMIC STABILITY: HOUSING INSECURITY
IN THE LAST 12 MONTHS, WAS THERE A TIME WHEN YOU DID NOT HAVE A STEADY PLACE TO SLEEP OR SLEPT IN A SHELTER (INCLUDING NOW)?: NO

## 2024-06-17 SDOH — ECONOMIC STABILITY: INCOME INSECURITY: IN THE PAST 12 MONTHS, HAS THE ELECTRIC, GAS, OIL, OR WATER COMPANY THREATENED TO SHUT OFF SERVICE IN YOUR HOME?: NO

## 2024-06-17 SDOH — ECONOMIC STABILITY: FOOD INSECURITY: WITHIN THE PAST 12 MONTHS, THE FOOD YOU BOUGHT JUST DIDN'T LAST AND YOU DIDN'T HAVE MONEY TO GET MORE.: NEVER TRUE

## 2024-06-17 SDOH — ECONOMIC STABILITY: FOOD INSECURITY: WITHIN THE PAST 12 MONTHS, YOU WORRIED THAT YOUR FOOD WOULD RUN OUT BEFORE YOU GOT MONEY TO BUY MORE.: NEVER TRUE

## 2024-06-17 SDOH — ECONOMIC STABILITY: INCOME INSECURITY: IN THE LAST 12 MONTHS, WAS THERE A TIME WHEN YOU WERE NOT ABLE TO PAY THE MORTGAGE OR RENT ON TIME?: NO

## 2024-06-17 ASSESSMENT — COGNITIVE AND FUNCTIONAL STATUS - GENERAL
MOVING FROM LYING ON BACK TO SITTING ON SIDE OF FLAT BED WITH BEDRAILS: A LITTLE
STANDING UP FROM CHAIR USING ARMS: A LITTLE
MOBILITY SCORE: 16
TURNING FROM BACK TO SIDE WHILE IN FLAT BAD: A LITTLE
MOVING TO AND FROM BED TO CHAIR: A LITTLE
WALKING IN HOSPITAL ROOM: A LITTLE
CLIMB 3 TO 5 STEPS WITH RAILING: TOTAL

## 2024-06-17 ASSESSMENT — PAIN SCALES - GENERAL: PAINLEVEL_OUTOF10: 0 - NO PAIN

## 2024-06-17 ASSESSMENT — ACTIVITIES OF DAILY LIVING (ADL): ADL_ASSISTANCE: INDEPENDENT

## 2024-06-17 ASSESSMENT — PAIN - FUNCTIONAL ASSESSMENT: PAIN_FUNCTIONAL_ASSESSMENT: 0-10

## 2024-06-17 NOTE — PROGRESS NOTES
"ID:  Milton Lane is a 74 y.o. male on hospital day 2 of admission presenting with Acute on chronic combined systolic (congestive) and diastolic (congestive) heart failure (Multi).    Subjective   The patient seen and examined this morning at bedside. No overnight events.  Patient reported feeling better today and was wondering if he can go home.  He does not mind if he can be discharged on oxygen.  He has several snacks at bedside and overnight he refused to follow fluid restriction to 1.2 L/day.  Will increase his Bumex from 2 mg to 3 mg twice daily.        Objective     Visit Vitals  /58 (BP Location: Right arm, Patient Position: Lying)   Pulse 61   Temp 36.7 °C (98.1 °F) (Temporal)   Resp 20   Ht 1.753 m (5' 9\")   Wt 94.7 kg (208 lb 12.4 oz)   SpO2 92%   BMI 30.83 kg/m²   Smoking Status Every Day   BSA 2.15 m²        Physical Examination:  General: Obese, well nourished, A&Ox3, conversational, lying comfortably in bed, not in pain or distress.  On 4 L nasal cannula  HEENT: Mucous membranes moist.  Head/Neck: Atraumatic, Normocephalic. Neck supple.   Respiratory: Decreased air entry at lung bases but no crackles/wheezing appreciated.  Cardiovascular: regular rhythm, normal S1 and S2. No added sounds or murmurs,  Gastrointestinal: soft, non-tender abdomen, bowel sounds present, no guarding or rebound.   Extremities: + 2/3 pitting edema up to the knees bilaterally.  Neurological:  no focal neurologic deficits.  Psychological: Appropriate mood, normal affect.    Laboratory Data:    Results from last 7 days   Lab Units 06/17/24  0527 06/16/24  0356 06/15/24  0321   WBC AUTO x10*3/uL 6.7 6.3 7.7   RBC AUTO x10*6/uL 3.47* 3.76* 3.82*   HEMOGLOBIN g/dL 11.2* 12.0* 12.5*     Results from last 7 days   Lab Units 06/17/24  0528 06/17/24  0527 06/16/24  0356 06/15/24  1352 06/15/24  0320 06/14/24  2154 06/14/24 2032   SODIUM mmol/L  --  131* 130* 127* 127*   < > 127*   POTASSIUM mmol/L  --  3.8 3.9 4.2 4.3   < > 4.0 "   CHLORIDE mmol/L  --  92* 90* 91* 90*   < > 90*   CO2 mmol/L  --  32 31 31 29   < > 29   BUN mg/dL  --  23 20 14 12   < > 10   CREATININE mg/dL  --  0.85 0.92 0.73 0.70   < > 0.69   CALCIUM mg/dL  --  8.5* 8.9 9.2 8.7   < > 8.8   PHOSPHORUS mg/dL  --  3.9 5.8* 5.0* 4.3   < >  --    MAGNESIUM mg/dL 1.81  --  2.13  --  1.84   < >  --    BILIRUBIN TOTAL mg/dL  --   --   --   --   --   --  0.7   ALT U/L  --   --   --   --   --   --  10   AST U/L  --   --   --   --   --   --  17    < > = values in this interval not displayed.       Imaging:  Electrocardiogram, 12-lead PRN ACS symptoms    Result Date: 6/17/2024  Sinus rhythm with Premature atrial complexes Possible Inferior infarct (cited on or before 25-JUL-2021) Abnormal ECG When compared with ECG of 14-JUN-2024 22:54, (unconfirmed) Premature atrial complexes are now Present Questionable change in initial forces of Inferior leads Nonspecific T wave abnormality now evident in Lateral leads      Medications:  Scheduled medications  atorvastatin, 20 mg, oral, Daily  bumetanide, 3 mg, oral, BID  empagliflozin, 12.5 mg, oral, Daily  enoxaparin, 40 mg, subcutaneous, q24h  folic acid, 1 mg, oral, Daily  ipratropium-albuteroL, 3 mL, nebulization, 4x daily  isosorbide mononitrate ER, 15 mg, oral, Daily  lamoTRIgine, 100 mg, oral, Daily  metoprolol succinate XL, 25 mg, oral, Daily  multivitamin with minerals, 1 tablet, oral, Daily  nicotine, 1 patch, transdermal, Daily   Followed by  [START ON 7/27/2024] nicotine, 1 patch, transdermal, Daily   Followed by  [START ON 8/10/2024] nicotine, 1 patch, transdermal, Daily  polyethylene glycol, 17 g, oral, Daily  spironolactone, 12.5 mg, oral, Daily  thiamine, 100 mg, oral, Daily      Continuous medications     PRN medications  PRN medications: acetaminophen, ipratropium-albuteroL, oxygen    Assessment    Assessment & Plan     Principal Problem:    Acute on chronic combined systolic (congestive) and diastolic (congestive) heart failure  (Multi)  Active Problems:    Shortness of breath       # Acute exacerbation of  CHF 2/2 medication non-adherence and dietary indiscretion  # ROWELL  # CAD   # Tobacco abuse with possible underlying COPD  -BNP 1747  -CXR showing enlarged cardiac silhouette with moderate pulmonary edema  -ECHO report pending  -Increase Bumex from 2 mg to 3 mg BID and aldactone 12.5 daily  -Nicotine patches  -q4 duonebs with q2 duonebs as needed  -Fluid restriction of 1200 ml  -Continue supplemental oxygen to maintain oxygen saturation > 90%, wean as tolerated. Currently on 3 L NC.  -Strict I's and O's.  -Incentive spirometer  -Chest physio therapy  -Would benefit from outpatient PFTs if not already performed, as well as continued follow up with Cardiology.     # Hyponatremia likely 2/2 hypervolemia vs. Beer potomania  # History of mild hyponatremia  # Alcohol abuse  Patient is chronically hyponatremic in the high 120 low 130 range, he has significant beer consumption and medication noncompliance with CHF. Suspect that this is due to combination of hypervolemic state and beer potomania. Serum osmolality being low supports hypervolemic etiology.  -Na 127 on admission, asymptomatic.  Today sodium 131.  -Folic acid, thiamine, and multivitamin ordered  -Discussed risks of continued alcohol abuse, patient has no interest in quitting  -Will monitor for signs of alcohol withdrawal  -Trend daily RFP, avoid overcorrection of Na with goal correct 6-8 mEq in any 24 hour period of time     # NSTEMI, likely type II demand   -Troponin 81 -> 83, EKG without obvious ischemic changes. Suspect that this is likely supply demand mismatch due to exacerbation of heart failure.     # Paroxysmal A-fib not on AC  Per records Afib was a one time occurrence without remittance, triggered potentially by alcohol. Given that there has not been recorded arrhythmia since, will hold AC at this time.     # Non-rheumatic AS  # HTN  # ALBERTO  # Bipolar with PTSD  -Continue home  medications and management as appropriate    Global Plan of Care  Fluid: PRN  Electrolytes: PRN  Nutrition: Cardiac  DVT Prophylaxis: Lovenox  GI Prophylaxis: None  Disposition: Anticipate discharge likely tomorrow after appropriate diuresing.  Code Status: DNR     Emergency Contact: Extended Emergency Contact Information  Primary Emergency Contact: Loan Cuenca  Mobile Phone: [phone]  Relation: Power of      Patient seen and discussed with the attending.  Signature: Fernando Kahn MD, PGY II Internal medicine  Date: June 17, 2024   Please excuse any misspellings or unintended errors related to the Dragon speech recognition software used to dictate this note.

## 2024-06-17 NOTE — PROGRESS NOTES
Physical Therapy    Physical Therapy Evaluation & Treatment    Patient Name: Milton Lane  MRN: [id number]  Today's Date: 6/17/2024   Time Calculation  Start Time: 1132  Stop Time: 1200  Time Calculation (min): 28 min    Assessment/Plan   PT Assessment  PT Assessment Results: Decreased strength, Decreased endurance, Decreased range of motion, Impaired balance, Decreased mobility, Impaired judgement, Decreased safety awareness, Pain  Rehab Prognosis: Good  Evaluation/Treatment Tolerance: Patient limited by fatigue  Medical Staff Made Aware: Yes  End of Session Communication: Bedside nurse  Assessment Comment: Pt is a 75 y/o male admitted for acute on chronic combined systolic congestive and diastolic heart failure. Pt presents with decreased strength, endurance and balance. Pt able to tolerate bed mobility, transfers and gait training in room. Despite AMPAC of 16 pt is unsafe to return home at this time as he is unsteady on feet and demos decreased endurance. He is functioning below baseline level of function and will benefit from skilled therapy during stay to improve overall functional mobility and safety awareness. Upon discharge pt will benefit from high intensity therapy for continued improvement in functional mobility.     End of Session Patient Position: Bed, 3 rail up, Alarm on (Pt seated EOB with call light within reach; RN aware of pt position.)   IP OR SWING BED PT PLAN  Inpatient or Swing Bed: Inpatient  PT Plan  Treatment/Interventions: Bed mobility, Transfer training, Gait training, Stair training, Balance training, Neuromuscular re-education, Strengthening, Endurance training, Range of motion, Therapeutic exercise, Therapeutic activity, Home exercise program, Positioning  PT Plan: Ongoing PT  PT Frequency: 5 times per week  PT Discharge Recommendations: High intensity level of continued care  Equipment Recommended upon Discharge: Wheeled walker  PT Recommended Transfer Status: Assist x1, Assistive  device, Contact guard  PT - OK to Discharge: Yes (Once medically cleared.)    Subjective     General Visit Information:  General  Reason for Referral: P/w increasing leg edema and SOB. Pt has had history of poor compliance with medications. Dyspnea mostly with exertion and orthopnea. Pt admitted for acute on chronic combined systolic congestive and diastolic heart failure.  Referred By: Dr. Hung  Past Medical History Relevant to Rehab: HFrEF, CAD, afib not on AC, HTN, chronic hyponatremia, cardiomyopathy, ALBERTO, bipolar and PTSD, obesity  Family/Caregiver Present: No  Prior to Session Communication: Bedside nurse  Patient Position Received: Bed, 3 rail up, Alarm on  Preferred Learning Style: verbal  General Comment: Pt pleasant and agreeable to work with therapy. Pt with c/o weakness and unhappy about not being able to ambulate in room independently. Pt educated on fall risk and to not ambulate on his own in room.  Home Living:  Home Living  Type of Home: Other (Comment) (Extended Stay of [facility] ~4 years on 3rd floor)  Lives With: Alone  Home Adaptive Equipment: Walker rolling or standard, Scooter (Rollator walker and electric scooter)  Home Layout: One level  Home Access: Elevator  Bathroom Shower/Tub: Tub/shower unit  Bathroom Equipment: Grab bars in shower, Shower chair with back  Home Living Comments: Pt reports increased difficulty with moving legs and has not been able to shower 2/2 not being able to lift legs into shower Pt also reports he is unable to stand for longer periods of time to perform cooking.  Prior Level of Function:  Prior Function Per Pt/Caregiver Report  Level of Secretary: Independent with ADLs and functional transfers, Independent with homemaking with ambulation  Receives Help From:  (pt reports his neighbors sometimes assist with transportation if needed.)  ADL Assistance: Independent  Homemaking Assistance: Independent  Ambulatory Assistance: Independent (Mod I short distances  with rollator walker and electric scooter for longer distances)  Vocational: Retired ()  Prior Function Comments: (-) drives, (-) falls; meals on wheels 3x/week, pt reports he has no family in the area to assist him  Precautions:  Precautions  Hearing/Visual Limitations: [place]  Medical Precautions: Fall precautions, Oxygen therapy device and L/min (2L O2 via NC)  Vital Signs:  Vital Signs  SpO2:  (Pt desaturated to 88% on 2L O2 via NC with gait training. Able to recover during rest break to 92%.)    Objective   Pain:  Pain Assessment  Pain Assessment: 0-10  Pain Score:  (Pt did not numerically rank pain)  Pain Type: Acute pain  Pain Location:  (BL heels)  Pain Orientation: Right, Left  Pain Interventions: Repositioned  Cognition:  Cognition  Overall Cognitive Status: Within Functional Limits  Orientation Level: Oriented X4    General Assessments:  General Observation  General Observation: BL LE swelling and redness, weeping from LEs.     Activity Tolerance  Endurance: Tolerates 10 - 20 min exercise with multiple rests    Sensation  Light Touch: No apparent deficits  Sensation Comment: Pt denies any n/t.    Static Sitting Balance  Static Sitting-Balance Support: Bilateral upper extremity supported, Feet supported  Static Sitting-Level of Assistance: Close supervision  Dynamic Sitting Balance  Dynamic Sitting-Balance Support: Bilateral upper extremity supported, Feet supported  Dynamic Sitting-Comments: Close supervision scooting glutes towards EOB    Static Standing Balance  Static Standing-Balance Support: Bilateral upper extremity supported  Static Standing-Level of Assistance: Contact guard  Dynamic Standing Balance  Dynamic Standing-Balance Support: Bilateral upper extremity supported  Dynamic Standing-Comments: CGA  Functional Assessments:    Bed Mobility  Bed Mobility: Yes  Bed Mobility 1  Bed Mobility 1: Supine to sitting, Scooting  Level of Assistance 1: Close supervision  Bed Mobility Comments 1:  Use of bed HR.  Bed Mobility 2  Bed Mobility  2: Sitting to supine, Supine to sitting  Level of Assistance 2:  (Min A sit > sup and close supervision sup > sit)  Bed Mobility Comments 2: Required assist with LEs for sit > sup. Pt then decided to sit EOB and completed sup > sit close supervision.    Transfers  Transfer: Yes  Transfer 1  Transfer From 1: Bed to  Transfer to 1: Stand  Technique 1: Sit to stand  Transfer Device 1: Walker  Transfer Level of Assistance 1: Contact guard, Minimal verbal cues  Trials/Comments 1: Bed elevated. VCs for proper hand placement.  Transfers 2  Transfer From 2: Stand to, Commode-standard to  Transfer to 2: Commode-standard, Stand  Technique 2: Sit to stand, Stand to sit  Transfer Device 2: Walker  Transfer Level of Assistance 2: Contact guard, Minimal verbal cues  Trials/Comments 2: x2 trials  Transfers 3  Transfer From 3: Bed to  Transfer to 3: Stand  Technique 3: Stand to sit  Transfer Device 3: Walker  Transfer Level of Assistance 3: Contact guard, Moderate verbal cues  Trials/Comments 3: Difficulty controlling descend to sit. VCs and demonstration for proper hand placement and safety awareness with FWW.    Ambulation/Gait Training  Ambulation/Gait Training Performed: Yes  Ambulation/Gait Training 1  Surface 1: Level tile  Device 1: Rolling walker  Assistance 1: Contact guard, Minimal verbal cues  Quality of Gait 1: Wide base of support, Diminished heel strike, Antalgic (decreased liang, increased fatigue, unsteadiness)  Comments/Distance (ft) 1: ~3 ft from bed to BSC, static sitting rest break on BSC for ~5 min  Ambulation/Gait Training 2  Surface 2: Level tile  Device 2: Rolling walker  Assistance 2: Contact guard, Moderate verbal cues  Quality of Gait 2: Wide base of support, Diminished heel strike, Forward flexed posture, Antalgic (decreased liang, increased fatigue, unsteadiness on feet, dyspnea with exertion)  Comments/Distance (ft) 2: ~40 ft in room; VCs for safety  awareness with FWW, pursed lipped breathing to minimize SOB and safety awareness with oxygen cord (Pt desaturated to 88% on 2L O2 via NC, quickly able to recover to 92% once seated.)    Extremity/Trunk Assessments:     RLE   RLE :  (Limited hip/knee ROM. Strength not formally assessed, assumed >/=3/5 strength based on functional mobility.)  LLE   LLE :  (Limited hip/knee ROM. Strength not formally assessed, assumed >/=3/5 strength based on functional mobility.)  Treatments:  Pt able to tolerate the following activities during today's treatment session. Pt instructed/educated throughout the session on safety awareness, body mechanics and proper hand placement. Skilled monitoring of vitals throughout session for pt safety.     Therapeutic Activity  Therapeutic Activity Performed: Yes  Therapeutic Activity 1: Static sitting on BSC with UE/LE support close supervision for ~5 min. Pt instructed to maintain upright posture.    Bed Mobility  Bed Mobility: Yes  Bed Mobility 1  Bed Mobility 1: Supine to sitting, Scooting  Level of Assistance 1: Close supervision  Bed Mobility Comments 1: Use of bed HR.  Bed Mobility 2  Bed Mobility  2: Sitting to supine, Supine to sitting  Level of Assistance 2:  (Min A sit > sup and close supervision sup > sit)  Bed Mobility Comments 2: Required assist with LEs for sit > sup. Pt then decided to sit EOB and completed sup > sit close supervision.    Ambulation/Gait Training  Ambulation/Gait Training Performed: Yes  Ambulation/Gait Training 1  Surface 1: Level tile  Device 1: Rolling walker  Assistance 1: Contact guard, Minimal verbal cues  Quality of Gait 1: Wide base of support, Diminished heel strike, Antalgic (decreased liang, increased fatigue, unsteadiness)  Comments/Distance (ft) 1: ~3 ft from bed to BSC, static sitting rest break on BSC for ~5 min  Ambulation/Gait Training 2  Surface 2: Level tile  Device 2: Rolling walker  Assistance 2: Contact guard, Moderate verbal cues  Quality  of Gait 2: Wide base of support, Diminished heel strike, Forward flexed posture, Antalgic (decreased liang, increased fatigue, unsteadiness on feet, dyspnea with exertion)  Comments/Distance (ft) 2: ~40 ft in room; VCs for safety awareness with FWW, pursed lipped breathing to minimize SOB and safety awareness with oxygen cord (Pt desaturated to 88% on 2L O2 via NC, quickly able to recover to 92% once seated.)    Outcome Measures:  [facility] Basic Mobility  Turning from your back to your side while in a flat bed without using bedrails: A little  Moving from lying on your back to sitting on the side of a flat bed without using bedrails: A little  Moving to and from bed to chair (including a wheelchair): A little  Standing up from a chair using your arms (e.g. wheelchair or bedside chair): A little  To walk in hospital room: A little  Climbing 3-5 steps with railing: Total  Basic Mobility - Total Score: 16    Encounter Problems       Encounter Problems (Active)       Balance       Pt will demonstrate static/dynamic standing balance for >/= 5 min SBA without evidence of instability or LOB with functional mobility tasks.         Start:  06/17/24    Expected End:  07/01/24               Mobility       Pt will be able to ambulate >/= 50 ft with least restrictive device mod I with good safety awareness.        Start:  06/17/24    Expected End:  07/01/24            Pt will complete supine, seated and standing exercises to maintain overall strength with minimal verbal cues.         Start:  06/17/24    Expected End:  07/01/24            Pt will maintain >92 % SpO2 with functional mobility activities without evidence of SOB.        Start:  06/17/24    Expected End:  07/01/24                   PT Transfers       Pt will be able to complete all bed mobility tasks independently from flat HOB.        Start:  06/17/24    Expected End:  07/01/24            Pt will be able to complete all transfers with least restrictive device mod I  demonstrating good safety awareness and proper body mechanics.        Start:  06/17/24    Expected End:  07/01/24                 Education Documentation  Precautions, taught by Latonya Burnett PT at 6/17/2024  2:06 PM.  Learner: Patient  Readiness: Acceptance  Method: Explanation, Demonstration  Response: Verbalizes Understanding, Demonstrated Understanding, Needs Reinforcement    Body Mechanics, taught by Latonya Burnett PT at 6/17/2024  2:06 PM.  Learner: Patient  Readiness: Acceptance  Method: Explanation, Demonstration  Response: Verbalizes Understanding, Demonstrated Understanding, Needs Reinforcement    Home Exercise Program, taught by Latonya Burnett PT at 6/17/2024  2:06 PM.  Learner: Patient  Readiness: Acceptance  Method: Explanation, Demonstration  Response: Verbalizes Understanding, Demonstrated Understanding, Needs Reinforcement    Mobility Training, taught by Latonya Burnett PT at 6/17/2024  2:06 PM.  Learner: Patient  Readiness: Acceptance  Method: Explanation, Demonstration  Response: Verbalizes Understanding, Demonstrated Understanding, Needs Reinforcement    Education Comments  No comments found.

## 2024-06-17 NOTE — PROGRESS NOTES
06/17/24 1056   Discharge Planning   Living Arrangements Alone   Support Systems Friends/neighbors   Assistance Needed walker; neighbor provides transportation   Type of Residence   (Extended Stay)   Patient expects to be discharged to: Home   Does the patient need discharge transport arranged? Yes   RoundTrip coordination needed? Yes     Met with patient at bedside. Patient lives at home alone on the 3rd floor with elevator access of an Extended Stay in [place]. He states his room is handicapped equipped and has grab bars and a kitchenette. Patient is independent with ADLs. He has a rollator and a standard walker and uses a scooter when he leaves the extended stay. He gets Meals on Wheels 3x per week. He states he is able to stand up and walk to and from the bathroom with a walker, and is able to stand to heat up meals. He is requesting to get up while here at the hospital at least 2-3 times per day with a walker because he would like to be strong enough to return to his extended stay when medically cleared for d/c. Notified nursing of patient's request. [facility] but he is unable to remember her name at the time of this assessment. Patient does not drive. His neighbor drives him to appointments as needed, but his neighbor works. Patient is working on getting an updated state ID so he can get transport arranged through [facility]. He says his cardiologist is with . Patient's goal is to return home at discharge. Therapy evals are pending.     2112- Met with patient at bedside to discuss therapy recommendation of high intensity rehab at discharge. Printed acute rehab list for patient. Patient declined list and states he prefers to return home at discharge. Discussed HHC and patient also declined HHC. He states he would accept a list of  exercises to do at home. Will notify therapy. Discussed importance of a safe discharge plan. Patient confirms that he feels safe to return home at discharge.  He mentions again that he just needs to be able to get up with a walker a few times a day while at the hospital. Notified nursing.

## 2024-06-17 NOTE — NURSING NOTE
"2320 - Pt. Up to room 3035 from [facility]    2345 - Assessment performed.  Pt. Expresses SOB on minimal exertion.   Breathing appears mildly labored.   Currently on 3L NC.   Denies pain or discomfort at this time.   Belongings observed on pt's bedside table.  A cup of water present as well.  Attempted to educate pt. On current orders including the ordered fluid restriction.  Pt. Then stated \"I'm not going to be a prisoner.\"   \"If I have to fight with the doctor for that I will.\"  Nurse Joyce already expressed in handoff report that he was \"over his fluid restriction limit\".    0035 - On call doctors made aware of pt. Statement.      0330 - TruV Heel boots applied to pt.  Order in chart.  Pt. Expresses comfort.  Socks leaving indentation in pt's skin.  Pt. Refusing to remove socks.  Requesting more water at this time.   Pt. Again refusing to adhere to fluid restriction order.     0645 - Pt. Called to nursing requesting to dangle at bedside.  PCA assisted with reposition.  Pt. Then removed his Oxygen and said he \"will put it back on after breakfast\".  Attempt to educate pt. On oxygen desaturation and how it may affect him by being without oxygen for extended period of time.   Pt. Verbally expressed understanding and then dismissed nurse from the room.  In an attempt to increase pt's comfort, humidification added to 3L NC.       0650 - Laid eyes on pt.  He continues to refuse to wear his oxygen.  Also offered to request nasal spray for dryness.  Pt. Declined.  Will make oncoming nurse aware of pt's refusal.     EOS note:  Pt. Alert and oriented.  Tx to this unit around 2330.   Wearing 3LNC acutely, with goals to attempt to wean as tolerated.   SOB on exertion noted with minimal activity.  Attempt to educate pt. On position changes and deep breathing to aide in proper air movement.   Pt. Voiding via urinal at bedside.  Tele monitor recording much artifact due to pt. Restless movement.  BLE now in green heel protector boots.  " Small amount of weeping present at BLE.  Intact blister at L foot.

## 2024-06-18 ENCOUNTER — APPOINTMENT (OUTPATIENT)
Dept: CARDIOLOGY | Facility: HOSPITAL | Age: 74
End: 2024-06-18
Payer: MEDICARE

## 2024-06-18 LAB
ALBUMIN SERPL BCP-MCNC: 3.8 G/DL (ref 3.4–5)
ANION GAP SERPL CALC-SCNC: 12 MMOL/L (ref 10–20)
ATRIAL RATE: 74 BPM
ATRIAL RATE: 90 BPM
BUN SERPL-MCNC: 23 MG/DL (ref 6–23)
CALCIUM SERPL-MCNC: 8.7 MG/DL (ref 8.6–10.3)
CHLORIDE SERPL-SCNC: 90 MMOL/L (ref 98–107)
CO2 SERPL-SCNC: 32 MMOL/L (ref 21–32)
CREAT SERPL-MCNC: 0.77 MG/DL (ref 0.5–1.3)
EGFRCR SERPLBLD CKD-EPI 2021: >90 ML/MIN/1.73M*2
ERYTHROCYTE [DISTWIDTH] IN BLOOD BY AUTOMATED COUNT: 13.3 % (ref 11.5–14.5)
GLUCOSE SERPL-MCNC: 100 MG/DL (ref 74–99)
HCT VFR BLD AUTO: 37.9 % (ref 41–52)
HGB BLD-MCNC: 12.2 G/DL (ref 13.5–17.5)
MAGNESIUM SERPL-MCNC: 1.65 MG/DL (ref 1.6–2.4)
MCH RBC QN AUTO: 31.9 PG (ref 26–34)
MCHC RBC AUTO-ENTMCNC: 32.2 G/DL (ref 32–36)
MCV RBC AUTO: 99 FL (ref 80–100)
NRBC BLD-RTO: 0 /100 WBCS (ref 0–0)
P AXIS: 33 DEGREES
P AXIS: 46 DEGREES
P OFFSET: 179 MS
P OFFSET: 180 MS
P ONSET: 119 MS
P ONSET: 126 MS
PHOSPHATE SERPL-MCNC: 2.8 MG/DL (ref 2.5–4.9)
PLATELET # BLD AUTO: 228 X10*3/UL (ref 150–450)
POTASSIUM SERPL-SCNC: 3.7 MMOL/L (ref 3.5–5.3)
PR INTERVAL: 178 MS
PR INTERVAL: 186 MS
Q ONSET: 212 MS
Q ONSET: 215 MS
QRS COUNT: 13 BEATS
QRS COUNT: 14 BEATS
QRS DURATION: 102 MS
QRS DURATION: 98 MS
QT INTERVAL: 386 MS
QT INTERVAL: 416 MS
QTC CALCULATION(BAZETT): 456 MS
QTC CALCULATION(BAZETT): 461 MS
QTC FREDERICIA: 431 MS
QTC FREDERICIA: 446 MS
R AXIS: 84 DEGREES
R AXIS: 94 DEGREES
RBC # BLD AUTO: 3.83 X10*6/UL (ref 4.5–5.9)
SODIUM SERPL-SCNC: 130 MMOL/L (ref 136–145)
T AXIS: -32 DEGREES
T AXIS: -38 DEGREES
T OFFSET: 405 MS
T OFFSET: 423 MS
VENTRICULAR RATE: 74 BPM
VENTRICULAR RATE: 84 BPM
WBC # BLD AUTO: 6.9 X10*3/UL (ref 4.4–11.3)

## 2024-06-18 PROCEDURE — 85027 COMPLETE CBC AUTOMATED: CPT

## 2024-06-18 PROCEDURE — 2500000004 HC RX 250 GENERAL PHARMACY W/ HCPCS (ALT 636 FOR OP/ED)

## 2024-06-18 PROCEDURE — 93005 ELECTROCARDIOGRAM TRACING: CPT

## 2024-06-18 PROCEDURE — 2500000002 HC RX 250 W HCPCS SELF ADMINISTERED DRUGS (ALT 637 FOR MEDICARE OP, ALT 636 FOR OP/ED)

## 2024-06-18 PROCEDURE — 36415 COLL VENOUS BLD VENIPUNCTURE: CPT

## 2024-06-18 PROCEDURE — S4991 NICOTINE PATCH NONLEGEND: HCPCS

## 2024-06-18 PROCEDURE — 2500000005 HC RX 250 GENERAL PHARMACY W/O HCPCS: Performed by: INTERNAL MEDICINE

## 2024-06-18 PROCEDURE — 2500000001 HC RX 250 WO HCPCS SELF ADMINISTERED DRUGS (ALT 637 FOR MEDICARE OP)

## 2024-06-18 PROCEDURE — 94640 AIRWAY INHALATION TREATMENT: CPT | Mod: MUE

## 2024-06-18 PROCEDURE — 2500000002 HC RX 250 W HCPCS SELF ADMINISTERED DRUGS (ALT 637 FOR MEDICARE OP, ALT 636 FOR OP/ED): Performed by: INTERNAL MEDICINE

## 2024-06-18 PROCEDURE — 83735 ASSAY OF MAGNESIUM: CPT

## 2024-06-18 PROCEDURE — 80069 RENAL FUNCTION PANEL: CPT

## 2024-06-18 PROCEDURE — 93010 ELECTROCARDIOGRAM REPORT: CPT | Performed by: INTERNAL MEDICINE

## 2024-06-18 PROCEDURE — 99223 1ST HOSP IP/OBS HIGH 75: CPT | Performed by: INTERNAL MEDICINE

## 2024-06-18 PROCEDURE — 51702 INSERT TEMP BLADDER CATH: CPT

## 2024-06-18 PROCEDURE — 99233 SBSQ HOSP IP/OBS HIGH 50: CPT

## 2024-06-18 PROCEDURE — 2060000001 HC INTERMEDIATE ICU ROOM DAILY

## 2024-06-18 RX ORDER — GUAIFENESIN 600 MG/1
600 TABLET, EXTENDED RELEASE ORAL 2 TIMES DAILY PRN
Status: DISCONTINUED | OUTPATIENT
Start: 2024-06-18 | End: 2024-06-19 | Stop reason: HOSPADM

## 2024-06-18 RX ORDER — LIDOCAINE HYDROCHLORIDE 20 MG/ML
1 JELLY TOPICAL ONCE
Status: DISCONTINUED | OUTPATIENT
Start: 2024-06-18 | End: 2024-06-19 | Stop reason: HOSPADM

## 2024-06-18 RX ORDER — AMIODARONE HYDROCHLORIDE 200 MG/1
200 TABLET ORAL DAILY
Status: DISCONTINUED | OUTPATIENT
Start: 2024-06-19 | End: 2024-06-19 | Stop reason: HOSPADM

## 2024-06-18 RX ORDER — LANOLIN ALCOHOL/MO/W.PET/CERES
400 CREAM (GRAM) TOPICAL ONCE
Status: COMPLETED | OUTPATIENT
Start: 2024-06-18 | End: 2024-06-18

## 2024-06-18 RX ORDER — FLUTICASONE PROPIONATE 50 MCG
2 SPRAY, SUSPENSION (ML) NASAL DAILY
Status: DISCONTINUED | OUTPATIENT
Start: 2024-06-18 | End: 2024-06-19 | Stop reason: HOSPADM

## 2024-06-18 RX ORDER — POTASSIUM CHLORIDE 1.5 G/1.58G
20 POWDER, FOR SOLUTION ORAL ONCE
Status: COMPLETED | OUTPATIENT
Start: 2024-06-18 | End: 2024-06-18

## 2024-06-18 RX ORDER — SPIRONOLACTONE 25 MG/1
25 TABLET ORAL DAILY
Status: DISCONTINUED | OUTPATIENT
Start: 2024-06-19 | End: 2024-06-19

## 2024-06-18 RX ORDER — OXYMETAZOLINE HCL 0.05 %
2 SPRAY, NON-AEROSOL (ML) NASAL EVERY 12 HOURS PRN
Status: DISCONTINUED | OUTPATIENT
Start: 2024-06-18 | End: 2024-06-19 | Stop reason: HOSPADM

## 2024-06-18 RX ADMIN — ENOXAPARIN SODIUM 40 MG: 40 INJECTION SUBCUTANEOUS at 09:43

## 2024-06-18 RX ADMIN — AMIODARONE HYDROCHLORIDE 1 MG/MIN: 1.8 INJECTION, SOLUTION INTRAVENOUS at 06:18

## 2024-06-18 RX ADMIN — Medication 400 MG: at 05:54

## 2024-06-18 RX ADMIN — Medication 1 TABLET: at 09:43

## 2024-06-18 RX ADMIN — NICOTINE 1 PATCH: 21 PATCH, EXTENDED RELEASE TRANSDERMAL at 09:44

## 2024-06-18 RX ADMIN — FOLIC ACID 1 MG: 1 TABLET ORAL at 09:43

## 2024-06-18 RX ADMIN — IPRATROPIUM BROMIDE AND ALBUTEROL SULFATE 3 ML: 2.5; .5 SOLUTION RESPIRATORY (INHALATION) at 08:42

## 2024-06-18 RX ADMIN — POLYETHYLENE GLYCOL 3350 17 G: 17 POWDER, FOR SOLUTION ORAL at 09:43

## 2024-06-18 RX ADMIN — BUMETANIDE 3 MG: 1 TABLET ORAL at 17:28

## 2024-06-18 RX ADMIN — METOPROLOL SUCCINATE 25 MG: 25 TABLET, EXTENDED RELEASE ORAL at 09:44

## 2024-06-18 RX ADMIN — FLUTICASONE PROPIONATE 2 SPRAY: 50 SPRAY, METERED NASAL at 13:35

## 2024-06-18 RX ADMIN — AMIODARONE HYDROCHLORIDE 150 MG: 1.5 INJECTION, SOLUTION INTRAVENOUS at 05:55

## 2024-06-18 RX ADMIN — BUMETANIDE 3 MG: 1 TABLET ORAL at 09:40

## 2024-06-18 RX ADMIN — THIAMINE HCL TAB 100 MG 100 MG: 100 TAB at 09:43

## 2024-06-18 RX ADMIN — EMPAGLIFLOZIN 12.5 MG: 25 TABLET, FILM COATED ORAL at 09:43

## 2024-06-18 RX ADMIN — AMIODARONE HYDROCHLORIDE 0.5 MG/MIN: 1.8 INJECTION, SOLUTION INTRAVENOUS at 12:17

## 2024-06-18 RX ADMIN — SPIRONOLACTONE 12.5 MG: 25 TABLET ORAL at 09:43

## 2024-06-18 RX ADMIN — IPRATROPIUM BROMIDE AND ALBUTEROL SULFATE 3 ML: 2.5; .5 SOLUTION RESPIRATORY (INHALATION) at 20:42

## 2024-06-18 RX ADMIN — IPRATROPIUM BROMIDE AND ALBUTEROL SULFATE 3 ML: 2.5; .5 SOLUTION RESPIRATORY (INHALATION) at 17:17

## 2024-06-18 RX ADMIN — ATORVASTATIN CALCIUM 20 MG: 20 TABLET, FILM COATED ORAL at 20:52

## 2024-06-18 RX ADMIN — ISOSORBIDE MONONITRATE 15 MG: 30 TABLET, EXTENDED RELEASE ORAL at 09:43

## 2024-06-18 RX ADMIN — POTASSIUM CHLORIDE 20 MEQ: 1.5 POWDER, FOR SOLUTION ORAL at 05:55

## 2024-06-18 RX ADMIN — LAMOTRIGINE 100 MG: 100 TABLET ORAL at 09:44

## 2024-06-18 RX ADMIN — IPRATROPIUM BROMIDE AND ALBUTEROL SULFATE 3 ML: 2.5; .5 SOLUTION RESPIRATORY (INHALATION) at 13:34

## 2024-06-18 RX ADMIN — Medication 2.5 L/MIN: at 20:00

## 2024-06-18 ASSESSMENT — COGNITIVE AND FUNCTIONAL STATUS - GENERAL
EATING MEALS: A LITTLE
HELP NEEDED FOR BATHING: A LITTLE
TOILETING: A LITTLE
MOVING FROM LYING ON BACK TO SITTING ON SIDE OF FLAT BED WITH BEDRAILS: A LITTLE
EATING MEALS: A LITTLE
DRESSING REGULAR LOWER BODY CLOTHING: A LITTLE
WALKING IN HOSPITAL ROOM: A LITTLE
STANDING UP FROM CHAIR USING ARMS: A LITTLE
TOILETING: A LITTLE
CLIMB 3 TO 5 STEPS WITH RAILING: A LITTLE
WALKING IN HOSPITAL ROOM: A LITTLE
MOBILITY SCORE: 20
DRESSING REGULAR UPPER BODY CLOTHING: A LITTLE
CLIMB 3 TO 5 STEPS WITH RAILING: A LITTLE
DAILY ACTIVITIY SCORE: 18
PERSONAL GROOMING: A LITTLE
PERSONAL GROOMING: A LITTLE
DRESSING REGULAR UPPER BODY CLOTHING: A LITTLE
MOVING FROM LYING ON BACK TO SITTING ON SIDE OF FLAT BED WITH BEDRAILS: A LITTLE
HELP NEEDED FOR BATHING: A LITTLE
DAILY ACTIVITIY SCORE: 18
STANDING UP FROM CHAIR USING ARMS: A LITTLE
MOBILITY SCORE: 20
DRESSING REGULAR LOWER BODY CLOTHING: A LITTLE

## 2024-06-18 ASSESSMENT — PAIN SCALES - GENERAL
PAINLEVEL_OUTOF10: 0 - NO PAIN

## 2024-06-18 ASSESSMENT — PAIN - FUNCTIONAL ASSESSMENT
PAIN_FUNCTIONAL_ASSESSMENT: 0-10

## 2024-06-18 NOTE — DOCUMENTATION CLARIFICATION NOTE
"    PATIENT:               JACINAT GIMENEZ  ACCT #:                  [id number]  MRN:                       [id number]  :                       1950  ADMIT DATE:       2024 8:17 PM  DISCH DATE:  RESPONDING PROVIDER #:        [id number]          PROVIDER RESPONSE TEXT:    Acute Hypoxemic Respiratory Failure    CDI QUERY TEXT:    Clarification    Instruction:    Based on your assessment of the patient and the clinical information, please provide the requested documentation by clicking on the appropriate radio button and enter any additional information if prompted.    Question: Is there a diagnosis indicative of the clinical information    When answering this query, please exercise your independent professional judgment. The fact that a question is being asked, does not imply that any particular answer is desired or expected.    The patient's clinical indicators include:  Clinical Information:  74M presents for ROWELL, SOB, increased leg edema    Clinical Indicators:  - Vitals on admission, : 98.6-74-/54-96 percent on O2 via NC  - ED, , Ileana: \"Borderline oxygen saturation around 90 percent, placed on 2 L O2.  Does not normally wear oxygen.\"  - HP, 6/15, Kenzie: \"A/c mixed CHF 2/2 medication nonadherence; ROWELL and mild hypoxia 2/2 above\"  - Med, , Kenzie: \"No chest pain, shortness of breath on 3 L NC...Continue supplemental oxygen to maintain oxygen saturation greater than 90 percent, wean as tolerated. Currently on 3 L NC\"    Treatment:  O2 via NC/oximizer, CXR, frequent SpO2 monitoring, IV Lasix. PO Bumex    Risk Factors:  Noncompliance, COPD, HFrEF  Options provided:  -- Acute Hypoxemic Respiratory Failure  -- No acute respiratory failure  -- Other - I will add my own diagnosis  -- Refer to Clinical Documentation Reviewer    Query created by: Dennis Cary on 2024 6:10 PM      Electronically signed by:  TRUE HERRERA MD 2024 7:04 AM          "

## 2024-06-18 NOTE — CARE PLAN
Patient alert and oriented x3. Patient in back and fourth from bed to chair this shift and able to reposition for comfort. BLE elevated. Patient having frequent PVCs and runs of vtach.  Patient c/o diff urinating even though pure wick filling up with urine. Bladder scan 979ml. Provider notified and gave order for dave. Patient has enlarged prostate and nursing not able to insert started or coude dave. EKG also obtained at this time and provider at bedside. Provider aware and patient transferred to higher level of care for amiodarone drip and closer monitoring      The patient's goals for the shift include      The clinical goals for the shift include pt will have no acute resp distress by end of shift    Problem: Pain - Adult  Goal: Verbalizes/displays adequate comfort level or baseline comfort level  Outcome: Progressing     Problem: Safety - Adult  Goal: Free from fall injury  Outcome: Progressing     Problem: Discharge Planning  Goal: Discharge to home or other facility with appropriate resources  Outcome: Progressing     Problem: Chronic Conditions and Co-morbidities  Goal: Patient's chronic conditions and co-morbidity symptoms are monitored and maintained or improved  Outcome: Progressing     Problem: Skin  Goal: Participates in plan/prevention/treatment measures  Outcome: Progressing     Problem: Respiratory  Goal: No signs of respiratory distress (eg. Use of accessory muscles. Peds grunting)  Outcome: Progressing  Goal: Verbalize decreased shortness of breath this shift  Outcome: Progressing

## 2024-06-18 NOTE — CARE PLAN
Problem: Pain - Adult  Goal: Verbalizes/displays adequate comfort level or baseline comfort level  Outcome: Progressing     Problem: Safety - Adult  Goal: Free from fall injury  Outcome: Progressing     Problem: Discharge Planning  Goal: Discharge to home or other facility with appropriate resources  Outcome: Progressing     Problem: Chronic Conditions and Co-morbidities  Goal: Patient's chronic conditions and co-morbidity symptoms are monitored and maintained or improved  Outcome: Progressing     Problem: Skin  Goal: Participates in plan/prevention/treatment measures  Outcome: Progressing     Problem: Respiratory  Goal: No signs of respiratory distress (eg. Use of accessory muscles. Peds grunting)  Outcome: Progressing  Goal: Verbalize decreased shortness of breath this shift  Outcome: Progressing   The patient's goals for the shift include      The clinical goals for the shift include pt will have no acute resp distress by end of shift

## 2024-06-18 NOTE — PROGRESS NOTES
"Physical Therapy                 Therapy Communication Note    Patient Name: Milton Lane  MRN: [id number]  Today's Date: 6/18/2024     Discipline: Physical Therapy    Room 2122    Missed Time:   Missed Visit Reason:   Comment:       06/18/24 1445   General   Missed Visit Yes   Missed Visit Reason Other (Comment)  (Pt. tranfers from medical floor 3035 to ICU 2122 this AM after having PVC's and runs of V-Tach.)     Current Vitals.  HR 60's   BP on monitor 110/47    Per nursing note \"Patient transferred to higher level of care for amiodarone drip and closer monitoring.\"       "

## 2024-06-18 NOTE — CONSULTS
Consults  History Of Present Illness:    Milton Lane is a 74 y.o. male presenting with nonsustained ventricular tachycardia, acute on chronic systolic and diastolic heart failure.    This noncompliant 74-year-old man with chronic CAD, chronic ischemic cardiomyopathy with LVEF of 35 to 40% with a pseudo normal diastolic filling pattern by echo January 2023, COPD, and mild aortic stenosis (Jan 2023 echo:  Vmax=2.43 m/s; PIG=24; AVG= 16; dimensionless index= 0.38) is well-known to me.  I last saw him in December 2023.  His SPECT in January 2023 disclosed a large inferolateral and apical infarct with moderate lateral ischemia.  He  unequivocally has preferred conservative medical management over invasive cardiac procedures. We Naylor constrained by medical intolerances. He has a true intolerance of ACE inhibitors and ARB's for which reason he is not on these medications or Entresto.     The patient was admitted on 6/15/2024 with dyspnea on exertion and worsening lower extremity edema, after running out of medications weeks prior to his admission.  In the past he has also abruptly stopped taking his medications after running out, in the setting of which he experienced angina.  I have warned him at multiple previous encounters that not taking his medications, he could infarct and die, or end up in the hospital with decompensated heart failure.    He has a history of alcohol abuse, admitting to 48 beers per week.  He also has a longstanding history of tobacco abuse, and refuses to quit despite warnings.     He was admitted to the internal medicine service for decompensated heart failure.  Medical therapy was initiated.  On telemetry he has had runs of ventricular tachycardia for which reason I was asked to see him.  He is not symptomatic with the ventricular tachycardia.  He denies symptoms of angina, palpitations, syncope, or near syncope.         Last Recorded Vitals:  Vitals:    06/18/24 0900 06/18/24 1000 06/18/24 1100  06/18/24 1200   BP: (!) 107/49 116/56 104/52 120/57   BP Location:    Right arm   Patient Position:    Lying   Pulse: 66 66 58 62   Resp: 18 (!) 29 (!) 38 (!) 32   Temp:    36.1 °C (97 °F)   TempSrc:    Temporal   SpO2: 92% 93% 96% 100%   Weight:       Height:           Last Labs:    Results from last 7 days   Lab Units 06/18/24  0635 06/15/24  0204 06/14/24 2032   SODIUM mmol/L 130*   < > 127*   POTASSIUM mmol/L 3.7   < > 4.0   CHLORIDE mmol/L 90*   < > 90*   CO2 mmol/L 32   < > 29   BUN mg/dL 23   < > 10   CREATININE mg/dL 0.77   < > 0.69   CALCIUM mg/dL 8.7   < > 8.8   PROTEIN TOTAL g/dL  --   --  7.0   BILIRUBIN TOTAL mg/dL  --   --  0.7   ALK PHOS U/L  --   --  75   ALT U/L  --   --  10   AST U/L  --   --  17   GLUCOSE mg/dL 100*   < > 122*    < > = values in this interval not displayed.        Results for orders placed or performed during the hospital encounter of 06/14/24 (from the past 24 hour(s))   Electrocardiogram, 12-lead PRN ACS symptoms   Result Value Ref Range    Ventricular Rate 84 BPM    Atrial Rate 90 BPM    MO Interval 186 ms    QRS Duration 102 ms    QT Interval 386 ms    QTC Calculation(Bazett) 456 ms    P Axis 33 degrees    R Axis 84 degrees    T Axis -38 degrees    QRS Count 14 beats    Q Onset 212 ms    P Onset 119 ms    P Offset 179 ms    T Offset 405 ms    QTC Fredericia 431 ms   CBC   Result Value Ref Range    WBC 6.9 4.4 - 11.3 x10*3/uL    nRBC 0.0 0.0 - 0.0 /100 WBCs    RBC 3.83 (L) 4.50 - 5.90 x10*6/uL    Hemoglobin 12.2 (L) 13.5 - 17.5 g/dL    Hematocrit 37.9 (L) 41.0 - 52.0 %    MCV 99 80 - 100 fL    MCH 31.9 26.0 - 34.0 pg    MCHC 32.2 32.0 - 36.0 g/dL    RDW 13.3 11.5 - 14.5 %    Platelets 228 150 - 450 x10*3/uL   Magnesium   Result Value Ref Range    Magnesium 1.65 1.60 - 2.40 mg/dL   Renal Function Panel   Result Value Ref Range    Glucose 100 (H) 74 - 99 mg/dL    Sodium 130 (L) 136 - 145 mmol/L    Potassium 3.7 3.5 - 5.3 mmol/L    Chloride 90 (L) 98 - 107 mmol/L    Bicarbonate  32 21 - 32 mmol/L    Anion Gap 12 10 - 20 mmol/L    Urea Nitrogen 23 6 - 23 mg/dL    Creatinine 0.77 0.50 - 1.30 mg/dL    eGFR >90 >60 mL/min/1.73m*2    Calcium 8.7 8.6 - 10.3 mg/dL    Phosphorus 2.8 2.5 - 4.9 mg/dL    Albumin 3.8 3.4 - 5.0 g/dL         PTT - No results in last year.  1.2   13.8 _     Troponin I, High Sensitivity   Date/Time Value Ref Range Status   06/14/2024 09:54 PM 83 (HH) 0 - 20 ng/L Final     Comment:     Previous result verified on 6/14/2024 2121 on specimen/case [id number] called with component [facility] for procedure Troponin I, High Sensitivity, Initial with value 81 ng/L.   06/14/2024 08:32 PM 81 (HH) 0 - 20 ng/L Final     Troponin I   Date/Time Value Ref Range Status   01/08/2023 11:11 PM 41 (H) 0 - 20 ng/L Final     Comment:     .  Less than 99th percentile of normal range cutoff-  Female and children under 18 years old <14 ng/L; Male <21 ng/L: Negative  Repeat testing should be performed if clinically indicated.   .  Female and children under 18 years old 14-50 ng/L; Male 21-50 ng/L:  Consistent with possible cardiac damage and possible increased clinical   risk. Serial measurements may help to assess extent of myocardial damage.   .  >50 ng/L: Consistent with cardiac damage, increased clinical risk and  myocardial infarction. Serial measurements may help assess extent of   myocardial damage.   .   NOTE: Children less than 1 year old may have higher baseline troponin   levels and results should be interpreted in conjunction with the overall   clinical context.   .  NOTE: Troponin I testing is performed using a different   testing methodology at [facility] than at other   [facility] hospitals. Direct result comparisons should only   be made within the same method.     01/08/2023 07:50 PM 43 (H) 0 - 20 ng/L Final     Comment:     .  Less than 99th percentile of normal range cutoff-  Female and children under 18 years old <14 ng/L; Male <21 ng/L: Negative  Repeat testing should  be performed if clinically indicated.   .  Female and children under 18 years old 14-50 ng/L; Male 21-50 ng/L:  Consistent with possible cardiac damage and possible increased clinical   risk. Serial measurements may help to assess extent of myocardial damage.   .  >50 ng/L: Consistent with cardiac damage, increased clinical risk and  myocardial infarction. Serial measurements may help assess extent of   myocardial damage.   .   NOTE: Children less than 1 year old may have higher baseline troponin   levels and results should be interpreted in conjunction with the overall   clinical context.   .  NOTE: Troponin I testing is performed using a different   testing methodology at [facility] than at other   [facility] hospitals. Direct result comparisons should only   be made within the same method.     01/08/2023 05:27 PM 44 (H) 0 - 20 ng/L Final     Comment:     .  Less than 99th percentile of normal range cutoff-  Female and children under 18 years old <14 ng/L; Male <21 ng/L: Negative  Repeat testing should be performed if clinically indicated.   .  Female and children under 18 years old 14-50 ng/L; Male 21-50 ng/L:  Consistent with possible cardiac damage and possible increased clinical   risk. Serial measurements may help to assess extent of myocardial damage.   .  >50 ng/L: Consistent with cardiac damage, increased clinical risk and  myocardial infarction. Serial measurements may help assess extent of   myocardial damage.   .   NOTE: Children less than 1 year old may have higher baseline troponin   levels and results should be interpreted in conjunction with the overall   clinical context.   .  NOTE: Troponin I testing is performed using a different   testing methodology at [facility] than at other   [facility]. Direct result comparisons should only   be made within the same method.       BNP   Date/Time Value Ref Range Status   06/14/2024 08:32 PM 1,747 (H) 0 - 99 pg/mL Final    01/12/2023 06:40 AM 1,205 (H) 0 - 99 pg/mL Final     Comment:     .  <100 pg/mL - Heart failure unlikely  100-299 pg/mL - Intermediate probability of acute heart  .               failure exacerbation. Correlate with clinical  .               context and patient history.    >=300 pg/mL - Heart Failure likely. Correlate with clinical  .               context and patient history.  BNP testing is performed using different testing   methodology at [facility] than at other   system hospitals. Direct result comparisons should   only be made within the same method.     01/08/2023 04:22 PM 1,813 (H) 0 - 99 pg/mL Final     Comment:     .  <100 pg/mL - Heart failure unlikely  100-299 pg/mL - Intermediate probability of acute heart  .               failure exacerbation. Correlate with clinical  .               context and patient history.    >=300 pg/mL - Heart Failure likely. Correlate with clinical  .               context and patient history.  BNP testing is performed using different testing   methodology at [facility] than at other   [facility] hospitals. Direct result comparisons should   only be made within the same method.       Hemoglobin A1C   Date/Time Value Ref Range Status   01/08/2023 11:11 PM 5.5 % Final     Comment:          Diagnosis of Diabetes-Adults   Non-Diabetic: < or = 5.6%   Increased risk for developing diabetes: 5.7-6.4%   Diagnostic of diabetes: > or = 6.5%  .       Monitoring of Diabetes                Age (y)     Therapeutic Goal (%)   Adults:          >18           <7.0   Pediatrics:    13-18           <7.5                   7-12           <8.0                   0- 6            7.5-8.5   American Diabetes Association. Diabetes Care 33(S1), Jan 2010.       VLDL   Date/Time Value Ref Range Status   01/08/2023 11:11 PM 10 0 - 40 mg/dL Final      Last I/O:  I/O last 3 completed shifts:  In: 1850 (19.7 mL/kg) [P.O.:1850]  Out: 3550 (37.7 mL/kg) [Urine:3550 (1 mL/kg/hr)]  Weight:  94.1 kg     Past Medical History:  He has no past medical history on file.    Past Surgical History:  He has no past surgical history on file.      Social History:  He reports that he has been smoking cigarettes. He has never used smokeless tobacco. He reports current alcohol use. He reports that he does not use drugs.    Family History:  No family history on file.     Review of Systems:  Patient denies fevers, chills, nausea, vomiting, diarrhea, constipation, hematuria, dysuria, abdominal pain,  red blood per rectum, cough; all other review of systems is negative.    Allergies:  Losartan    Inpatient Medications:  Scheduled medications   Medication Dose Route Frequency    atorvastatin  20 mg oral Daily    bumetanide  3 mg oral BID    empagliflozin  12.5 mg oral Daily    enoxaparin  40 mg subcutaneous q24h    fluticasone  2 spray Each Nostril Daily    folic acid  1 mg oral Daily    ipratropium-albuteroL  3 mL nebulization 4x daily    isosorbide mononitrate ER  15 mg oral Daily    lamoTRIgine  100 mg oral Daily    lidocaine  1 Application urethral Once    metoprolol succinate XL  25 mg oral Daily    multivitamin with minerals  1 tablet oral Daily    nicotine  1 patch transdermal Daily    Followed by    [START ON 7/27/2024] nicotine  1 patch transdermal Daily    Followed by    [START ON 8/10/2024] nicotine  1 patch transdermal Daily    polyethylene glycol  17 g oral Daily    spironolactone  12.5 mg oral Daily    thiamine  100 mg oral Daily     PRN medications   Medication    acetaminophen    guaiFENesin    ipratropium-albuteroL    oxygen    oxymetazoline     Continuous Medications   Medication Dose Last Rate    amiodarone  0.5-1 mg/min 0.5 mg/min (06/18/24 1217)     Outpatient Medications:  Current Outpatient Medications   Medication Instructions    atorvastatin (LIPITOR) 20 mg, oral, Daily    empagliflozin (JARDIANCE) 12.5 mg, oral, Daily    fluticasone propion-salmeteroL (Advair Diskus) 250-50 mcg/dose diskus  inhaler 1 puff, inhalation, 2 times daily RT, Rinse mouth with water after use to reduce aftertaste and incidence of candidiasis. Do not swallow.    isosorbide mononitrate ER (IMDUR) 15 mg, oral, Daily, Do not crush or chew.    lamoTRIgine (LAMICTAL) 100 mg, oral, Daily    metoprolol succinate XL (TOPROL-XL) 25 mg, oral, Daily    nicotine (Nicoderm CQ) 7 mg/24 hr patch 1 patch, transdermal, Every 24 hours    nicotine polacrilex (COMMIT) 2 mg, Mouth/Throat, As needed    spironolactone (ALDACTONE) 12.5 mg, oral, Daily       Current Imaging  ECG 12 lead    Result Date: 6/18/2024  Sinus rhythm with Premature atrial complexes Rightward axis Possible Inferior infarct (cited on or before 25-JUL-2021) Abnormal ECG When compared with ECG of 15-VICK-2023 20:50, No significant change was found    Electrocardiogram, 12-lead PRN ACS symptoms    Result Date: 6/18/2024  Sinus rhythm with marked sinus arrhythmia Inferior infarct (cited on or before 25-JUL-2021) Abnormal ECG When compared with ECG of 16-JUN-2024 10:04, (unconfirmed) Premature atrial complexes are no longer Present Non-specific change in ST segment in Anterior leads Nonspecific T wave abnormality no longer evident in Lateral leads    Electrocardiogram, 12-lead PRN ACS symptoms    Result Date: 6/17/2024  Sinus rhythm with Premature atrial complexes Possible Inferior infarct (cited on or before 25-JUL-2021) Abnormal ECG When compared with ECG of 14-JUN-2024 22:54, (unconfirmed) Premature atrial complexes are now Present Questionable change in initial forces of Inferior leads Nonspecific T wave abnormality now evident in Lateral leads    ECG 12 lead    Result Date: 6/15/2024  Normal sinus rhythm with sinus arrhythmia Rightward axis Possible Inferior infarct (cited on or before 25-JUL-2021) Abnormal ECG When compared with ECG of 14-JUN-2024 20:43, (unconfirmed) Premature atrial complexes are no longer Present    XR chest 1 view    Result Date: 6/14/2024  Interpreted By:   Abhijeet Stone, STUDY: XR CHEST 1 VIEW;  6/14/2024 9:15 pm   INDICATION: Signs/Symptoms:Chest Pain.   COMPARISON: 01/12/2023   ACCESSION NUMBER(S): [id number]   ORDERING CLINICIAN: LONI CARMICHAEL   FINDINGS: There is enlargement cardiac silhouette with moderate pulmonary edema. Bilateral perihilar and bibasilar airspace disease present. Bilateral effusions.       1.  Enlarged cardiac silhouette with moderate pulmonary edema. Superimposed pneumonia at the bases difficult to exclude       MACRO: None   Signed by: Abhijeet Stone 6/14/2024 9:50 PM Dictation workstation:   [id number]    I have reviewed the patient's EKGs and telemetry.       Physical Exam:  GENERAL:  pleasant 74 year-old  HEENT: No xanthelasma  NECK: Supple, no palpable adenopathy or thyromegaly  CHEST: Clear to auscultation, respiratory effort unlabored  CARDIAC: RRR, normal S1 and S2, no audible  rub, gallop, carotids are brisk, PMI is not displaced; there is a 2/6 systolic murmur heard best at the RSB.  ABD: Active bowel sounds, nontender, no organomegaly, no evidence of ascites  EXT: No clubbing, cyanosis, or tenderness; there is 2-3+ lower extremity edema.  The right dorsalis pedis pulse is not dopplerable  NEURO: Awake, alert, appropriate, speech is fluent       Assessment/Plan   1.  Nonsustained ventricular tachycardia  2.  Acute on chronic systolic and diastolic heart failure, LVEF 35% by echocardiography January 2023.  Present hospitalization is precipitated by problem #3 below.  3.  Noncompliance with medications and physician advised  4.  Ischemic cardiomyopathy  5.  Extensive prior tobacco abuse history  6.  Alcohol abuse  7.  Hyperlipidemia  8.  Mild aortic stenosis  9.  COPD  10.  DNR    Plan:  1.  In no uncertain terms, I explained to him that his patterns of behavior and noncompliance are directly endangering his health.  The fact that he stops taking his medications and ends up in the hospital is potentially life-threatening and he  is fortunate to have survived this episode.  He has had a similar pattern of behavior in the past, and I have given him similar feedback in the past.  2.  Agree with medical therapy as you have initiated.  3.  Increase spironolactone  4.  Once again we had a conversation about his choices and wishes for aggressiveness of care.  Again, with great certainty he does not wish to undergo invasive cardiac procedures such as PCI or AICD implantation, fully understanding the consequences of such decisions.  5.  I will be available to provide care to him for the next 30 days, after which I am dismissing him from my practice.  6.  Noninvasive vascular studies of the lower extremity    Code Status:  DNR      Renato Mora MD

## 2024-06-18 NOTE — PROGRESS NOTES
"ID:  Milton Lane is a 74 y.o. male on hospital day 3 of admission presenting with Acute on chronic combined systolic (congestive) and diastolic (congestive) heart failure (Multi).    Subjective   The patient seen and examined this morning at bedside.  Overnight, he had more than 8 runs of V. tach for which she was upgraded to IMCU and started amnio drip.  Cardiology consulted.  Appreciate recommendations.  This morning, he denied chest pain, shortness of breath, nausea/vomiting or abdominal pain.  After reviewing telemetry, he had frequent 4-6 runs of V. tach after midnight.      Objective     Visit Vitals  /57 (BP Location: Right arm, Patient Position: Lying)   Pulse 62   Temp 36.1 °C (97 °F) (Temporal)   Resp (!) 32   Ht 1.753 m (5' 9.02\")   Wt 94.1 kg (207 lb 8 oz)   SpO2 100%   BMI 30.63 kg/m²   Smoking Status Every Day   BSA 2.14 m²      Physical Examination:  General: Obese, well nourished, A&Ox3, conversational, lying comfortably in bed, not in pain or distress.  On 2 L nasal cannula  HEENT: Mucous membranes moist.  Head/Neck: Atraumatic, Normocephalic. Neck supple.   Respiratory: Decreased air entry at lung bases but no crackles/wheezing appreciated.  Cardiovascular: regular rhythm, normal S1 and S2. No added sounds or murmurs,  Gastrointestinal: soft, non-tender abdomen, bowel sounds present, no guarding or rebound.   Extremities: + 2 pitting edema up to the knees bilaterally.  Neurological:  no focal neurologic deficits.  Psychological: Appropriate mood, normal affect.      Laboratory Data:    Results from last 7 days   Lab Units 06/18/24  0635 06/17/24  0527 06/16/24  0356   WBC AUTO x10*3/uL 6.9 6.7 6.3   RBC AUTO x10*6/uL 3.83* 3.47* 3.76*   HEMOGLOBIN g/dL 12.2* 11.2* 12.0*     Results from last 7 days   Lab Units 06/18/24  0635 06/17/24  0528 06/17/24  0527 06/16/24  0356 06/14/24 2154 06/14/24 2032   SODIUM mmol/L 130*  --  131* 130*   < > 127*   POTASSIUM mmol/L 3.7  --  3.8 3.9   < > 4.0 "   CHLORIDE mmol/L 90*  --  92* 90*   < > 90*   CO2 mmol/L 32  --  32 31   < > 29   BUN mg/dL 23  --  23 20   < > 10   CREATININE mg/dL 0.77  --  0.85 0.92   < > 0.69   CALCIUM mg/dL 8.7  --  8.5* 8.9   < > 8.8   PHOSPHORUS mg/dL 2.8  --  3.9 5.8*   < >  --    MAGNESIUM mg/dL 1.65 1.81  --  2.13   < >  --    BILIRUBIN TOTAL mg/dL  --   --   --   --   --  0.7   ALT U/L  --   --   --   --   --  10   AST U/L  --   --   --   --   --  17    < > = values in this interval not displayed.       Imaging:  Electrocardiogram, 12-lead PRN ACS symptoms    Result Date: 6/18/2024  Sinus rhythm with marked sinus arrhythmia Inferior infarct (cited on or before 25-JUL-2021) Abnormal ECG When compared with ECG of 16-JUN-2024 10:04, (unconfirmed) Premature atrial complexes are no longer Present Non-specific change in ST segment in Anterior leads Nonspecific T wave abnormality no longer evident in Lateral leads      Medications:  Scheduled medications  atorvastatin, 20 mg, oral, Daily  bumetanide, 3 mg, oral, BID  empagliflozin, 12.5 mg, oral, Daily  enoxaparin, 40 mg, subcutaneous, q24h  fluticasone, 2 spray, Each Nostril, Daily  folic acid, 1 mg, oral, Daily  ipratropium-albuteroL, 3 mL, nebulization, 4x daily  isosorbide mononitrate ER, 15 mg, oral, Daily  lamoTRIgine, 100 mg, oral, Daily  lidocaine, 1 Application, urethral, Once  metoprolol succinate XL, 25 mg, oral, Daily  multivitamin with minerals, 1 tablet, oral, Daily  nicotine, 1 patch, transdermal, Daily   Followed by  [START ON 7/27/2024] nicotine, 1 patch, transdermal, Daily   Followed by  [START ON 8/10/2024] nicotine, 1 patch, transdermal, Daily  polyethylene glycol, 17 g, oral, Daily  spironolactone, 12.5 mg, oral, Daily  thiamine, 100 mg, oral, Daily      Continuous medications  amiodarone, 0.5-1 mg/min, Last Rate: 0.5 mg/min (06/18/24 1217)      PRN medications  PRN medications: acetaminophen, guaiFENesin, ipratropium-albuteroL, oxygen, oxymetazoline    Assessment     Assessment & Plan     Principal Problem:    Acute on chronic combined systolic (congestive) and diastolic (congestive) heart failure (Multi)  Active Problems:    Shortness of breath       # Acute exacerbation of  CHF 2/2 medication non-adherence and dietary indiscretion  # ROWELL  # CAD   # Tobacco abuse with possible underlying COPD  # V. tach  -BNP 1747  -CXR showing enlarged cardiac silhouette with moderate pulmonary edema  -ECHO report pending  -Continue Bumex 3 mg BID and aldactone 12.5 daily  -He is diuresing will, urine output -2.1 L for the last 24 hours.  -Nicotine patches  -q4 duonebs with q2 duonebs as needed  -Fluid restriction of 1200 ml  -Continue supplemental oxygen to maintain oxygen saturation > 90%, wean as tolerated. Currently on 2 L NC.  -Strict I's and O's.  -Incentive spirometer.  -Chest physio therapy.  -Per PT/OT recommendation, he needs acute rehab.  -Cardiology consulted. Appreciate recommendations.     # Hyponatremia likely 2/2 hypervolemia vs. Beer potomania  # History of mild hyponatremia  # Alcohol abuse  Patient is chronically hyponatremic in the high 120 low 130 range, he has significant beer consumption and medication noncompliance with CHF. Suspect that this is due to combination of hypervolemic state and beer potomania. Serum osmolality being low supports hypervolemic etiology.  -Na 127 on admission, asymptomatic.  Today sodium 130.  -Folic acid, thiamine, and multivitamin ordered  -Discussed risks of continued alcohol abuse, patient has no interest in quitting  -Will monitor for signs of alcohol withdrawal  -Trend daily RFP, avoid overcorrection of Na with goal correct 6-8 mEq in any 24 hour period of time     # NSTEMI, likely type II demand   -Troponin 81 -> 83, EKG without obvious ischemic changes. Suspect that this is likely supply demand mismatch due to exacerbation of heart failure.     # Paroxysmal A-fib not on AC  Per records Afib was a one time occurrence without  remittance, triggered potentially by alcohol. Given that there has not been recorded arrhythmia since, will hold AC at this time.     # Non-rheumatic AS  # HTN  # ALBERTO  # Bipolar with PTSD  -Continue home medications and management as appropriate     Global Plan of Care  Fluid: PRN  Electrolytes: PRN  Nutrition: Cardiac  DVT Prophylaxis: Lovenox  GI Prophylaxis: None  Disposition: Anticipate discharge likely tomorrow after appropriate diuresing.  Code Status: DNR     Emergency Contact: Extended Emergency Contact Information  Primary Emergency Contact: Loan Cuenca  Mobile Phone: [phone]  Relation: Power of      Patient seen and discussed with the attending.  Signature: Fernando Kahn MD, PGY II Internal medicine  Date: June 18, 2024   Please excuse any misspellings or unintended errors related to the Dragon speech recognition software used to dictate this note.

## 2024-06-18 NOTE — NURSING NOTE
"Cardiovascular Nurse Navigator Education     This patient with pertinent history of combined stage C systolic/diastolic HF with EF 35% (per stress test 1/11/23 with resting EF 35% and post-stress EF 15%), paroxysmal a-fib not on AC, CAD (which [facility]), HTN, hyponatremia, bipolar, PTSD, smoker (although outpt records from [facility] indicate he reduced this to one cigarette every few days), and heavy ETOH (states buys a 48-can case of beer every week) was admitted 6/14/24 with progressive BLE edema, shortness of breath, weakness, fatigue, and orthopnea (for at least a month). He was hypoxic and O2 4l/nc applied (no home O2). CXR shows pulmonary edema, cardiomegaly, and mild bilateral pleural effusions. BNP 1747. . BUN/creat 10/0.69 (eGFR >90). Trop 81, 83. He was treated with Lasix 40 mg IV and is being managed by the medical team. He had one dose of Diamox 250 mg yesterday and his oral Bumetanide was resumed and increased.    [facility] He has seen Dr. Mora for cardiology in the past and states he likes him. According to [facility] records that could be accessed, he struggles with transportation as he has had \"problems\" with [facility] provided transportation in the past. He lives in the [facility] in [facility]. He has a neighbor Loan of whom he relies on for some of his needs, but only about once every 2 weeks as she works and he doesn't like to bother her. He has no family or other social support as most of his friends are chronically ill or passed away.  He states his mental status with his bipolar is stable and is not experiencing extreme swings like he did in the past.    GDMT: Intolerance noted in Dr. Mora's note to ACE/ARB/ARNI.  Metoprolol Succinate 25 mg daily, Isosorbide 15 mg daily, Jardiance 10 mg daily (12.5 mg daily at home by cutting 25 mg tabs in half), Bumetanide 3 mg daily (increased from home dose of 2 mg), " "Spironolactone 12.5 mg daily, along with Atorvastatin.    I met with pt this evening to provide HF education. He remains on supplemental O2 and admits to exertional dyspnea. His BLE still have 4+ weeping edema and have erythema and are very warm to touch. I applied YESI hose and encouraged elevation. His mood is stable currently and he is slightly [place].    Heart failure disease education, medication management including indications, daily self-assessment including weight, BP, pulse, and symptoms, sodium and fluid restriction, importance of one-week follow-up cardiology appointment, leg elevation and compression hose, and exercise to tolerance discussed. Patient provided with heart failure education materials including the  Living with Heart Failure Booklet and weight calendars. Barriers to self-care assessed.     He was interested and attentive although has many barriers. He admits to \"having problems\" that prevented him from refilling his medications (of which he couldn't elaborate on). He described an iPad that the [facility] gave him that only has VA apps that he is to use for virtual; appointments and VA communication but he has no idea how to use it, stating his neighbor Loan tries to help get him on Zoom for the appointments. He is [place] and states it is very difficult to hear anyone on the hotel phone. He was reluctant to elevate his legs as his chair doesn't lower the leg rest easily when he has urinary urgency with his Bumex. He is unable to put on/take off YESI hose with his grabber stick. He was using his Rolator to ambulate around the hotel but has been unable to for a month. He was going out of the hotel to nearby stores (he lives behind the [facility]) in his motorized scooter but admits this has been too difficult for a month. He did not know to call cardiology for yellow-zone symptoms but was weighing himself and seeing weight gain. He orders Meals on Wheels just 3 days per week and splitting the " meals, which he states is enough food. He was agreeable for additional assistance. I suggested having his Meals on Wheels changed to low sodium from [facility] and he is agreeable to this, therefore a dietary consult was placed to arrange. We will need an order from the primary team for this.  I will order  to see if an aide from [facility] could assist with ADL's and YESI hose placement daily, along with arranging for a TTY phone in his hotel room (if possible). When we discussed his ETOH use, I informed him of the danger of worsening cardiomyopathy and fluid overload with the amount he drinks. He flatly refused to cease or reduce, stating this was his only entertainment along with TV. I questioned if he would ever consider moving into an assisted living but he states he really likes where he resides currently.  I will round tomorrow and reinforce education, along with attempting to address as many SDOH barriers that he will allow. Patient has my contact information for any concerns/questions.     Addendum 6/18: Patient was moved to ICU this morning for runs VT and Amiodarone drip. He was sleeping when I rounded. I do not see a note from  yet. Dietitian rounded and Meals on Wheels changed to [facility] low sodium meal pickup. I will continue to follow-up tomorrow.    Addendum 6/19: I spoke with case management Kelly and ARSLAN Guardado and updated them on above, they will follow-up.     Update 6/19:   Debby (ARSLAN) states pt refused all assistance. Because of this, I attempted to at least arrange for a TTY phone for his hotel phone. The phone number for the hotel was an 800 number and the outgoing message states to call the local number. When I located the local number, it was a non-working number. I found another local number and it was to the hotel next to his and their  stated his hotel's phone number changed and she did not know what it was and could not help. I called the Extended Stay customer  "relations and the first representative placed me on hold then dropped the call. I called back and a different rep attempted to help and agrees that this is unusual but there is no way to contact that . He needed to talk to both the patient and POA and this was done with me being in pt's room and adding his POA to the call. He states he will \"create a case\" to request the TTY. He offered moving him to an ADA room but POA states he won't move, despite the fact that his refrigerator and counter are broken. I alerted the rep that these are safety concerns as well. He states he will create and file the case and the POA will be updated via email.    I reviewed HF education late this afternoon. Pt was thankful that we discussed this the other night and states he has been reading the Living with HF book. He is aware that Dr. Mora fired him from his practice for non-compliance. Pt is beginning to now realize how serious his HF is. He states he drinks to quiet the PTSD thoughts in his mind that torture him when he is fully awake and aware. I asked him if he has shared this with his [facility] and he states he has. I informed him there are newer psych meds available that will help better without the severe side effects of heavy alcohol usage. He was reluctant about med side effects and I encouraged him to review this with a psych appointment and cut back on the ETOH. This time, he did not flatly refuse and was more willing to discuss. He is aware his meals will now be low sodium through [facility] and he agrees to this. I alerted his friend to not bring him salty foods or snacks and she will try.   He qualified for home O2 and a portable tank was delivered to the bedside. There was question if he was being discharged or not this afternoon as he was initially struggling to urinate but then by later in the afternoon was able. Because of his mobility limitations, he agreed to allow transportation to be arranged with " [facility] to take him back to the hotel. They are here at this time (1945) to transport patient. I alerted Saad at [email or web address] that pt is heading home and he will send the tech out with the home O2 concentrator. Patient has my contact information for any concerns/questions.

## 2024-06-18 NOTE — PROGRESS NOTES
Nutrition Diet Education  Provider consult order (request for therapeutic meals on wheels home meals.)   Nutrition Note:  Milton Lane is a 74 y.o. male presenting 6/14 from home with shortness of breath and worsening BLE edema.  Work up revealing +CHF. Transferred to ICU 6/18 AM due to difficulties urinating, placing dave, and runs of Vtach.     Past Medical History  Chronic CAD, chronic ischemic cardiomyopathy (follows with Dr. Mora--stress test 1/2023: Large infarct involving inferior wall, apex, distal lateral wall. Moderate ischemia of the proximal and mid lateral wall. Resting EF 38% with post-rest ejection fraction 15 with transient post-rest cavity dilatation and a 3 times daily ratio of 1.4, pseudonormal diastolic filling pattern by echo), mild aortic stenosis, current tobacco abuse, aortic stenosis, PAF not on AC, HTN, ALBERTO, bipolar and PTSD, obesity, ?COPD (no outpatient PFTs despite recommended follow-up from last hospitalization)   Surgical History   has no past surgical history on file.      DIET: cardiac 2g Na with 1.2L fluid restriction.     Education Documentation    Met with pt at bedside; pt lives in extended stay hotel; receives Meals on Wheels 3x/week at baseline; per CHF nurse navigator pt is interested in the therapeutic [facility] meals. Messaged physician and received script for therapeutic diet. Case too discussed with [facility] staff (Lakesha, [phone]) and is aware pt will transition to the [facility] 2g sodium MOW meals. Pt agrees wants to continue with meals 3x/week and is agreeable with the 2g Na therapeutic diet. Rationale for low sodium discussed with pt verbalizing understanding. Pt denies need for further MNT intervention at present time.   Follow Up:     Time Spent (min): 30 minutes  Last Date of Nutrition Visit: 06/19/24  Nutrition Follow-Up Needed?: 5-7 days  Follow up Comment: addendum to 6/18 note; d/c with [facility]   6/19/2024 ADDENDUM:  Case discussed with LISBETINA and CHF nurse  navigator. Note tentative plan for discharge home today. Called Lakesha BRUNER ([phone]), aware of discharge. Lakesha called pt's friend that helps him and said and just resume [facility] on Friday, 6/21 (usual schedule is [facility]). Met with pt at bedside--aware will resume home regimen with 2g Na diet. Pt denies further questions at present time.

## 2024-06-19 ENCOUNTER — APPOINTMENT (OUTPATIENT)
Dept: CARDIOLOGY | Facility: CLINIC | Age: 74
End: 2024-06-19
Payer: MEDICARE

## 2024-06-19 ENCOUNTER — APPOINTMENT (OUTPATIENT)
Dept: CARDIOLOGY | Facility: HOSPITAL | Age: 74
End: 2024-06-19
Payer: MEDICARE

## 2024-06-19 VITALS
RESPIRATION RATE: 29 BRPM | BODY MASS INDEX: 30.99 KG/M2 | TEMPERATURE: 97.9 F | DIASTOLIC BLOOD PRESSURE: 65 MMHG | HEIGHT: 69 IN | OXYGEN SATURATION: 94 % | WEIGHT: 209.22 LBS | HEART RATE: 82 BPM | SYSTOLIC BLOOD PRESSURE: 128 MMHG

## 2024-06-19 LAB
ALBUMIN SERPL BCP-MCNC: 3.6 G/DL (ref 3.4–5)
ALP SERPL-CCNC: 56 U/L (ref 33–136)
ALT SERPL W P-5'-P-CCNC: 9 U/L (ref 10–52)
ANION GAP SERPL CALC-SCNC: 11 MMOL/L (ref 10–20)
AST SERPL W P-5'-P-CCNC: 19 U/L (ref 9–39)
BASOPHILS # BLD AUTO: 0.02 X10*3/UL (ref 0–0.1)
BASOPHILS NFR BLD AUTO: 0.3 %
BILIRUB SERPL-MCNC: 0.4 MG/DL (ref 0–1.2)
BUN SERPL-MCNC: 25 MG/DL (ref 6–23)
CALCIUM SERPL-MCNC: 8.9 MG/DL (ref 8.6–10.3)
CHLORIDE SERPL-SCNC: 89 MMOL/L (ref 98–107)
CO2 SERPL-SCNC: 36 MMOL/L (ref 21–32)
CREAT SERPL-MCNC: 0.83 MG/DL (ref 0.5–1.3)
EGFRCR SERPLBLD CKD-EPI 2021: >90 ML/MIN/1.73M*2
EOSINOPHIL # BLD AUTO: 0.02 X10*3/UL (ref 0–0.4)
EOSINOPHIL NFR BLD AUTO: 0.3 %
ERYTHROCYTE [DISTWIDTH] IN BLOOD BY AUTOMATED COUNT: 13.2 % (ref 11.5–14.5)
GLUCOSE SERPL-MCNC: 110 MG/DL (ref 74–99)
HCT VFR BLD AUTO: 35.8 % (ref 41–52)
HGB BLD-MCNC: 11.5 G/DL (ref 13.5–17.5)
IMM GRANULOCYTES # BLD AUTO: 0.03 X10*3/UL (ref 0–0.5)
IMM GRANULOCYTES NFR BLD AUTO: 0.4 % (ref 0–0.9)
LYMPHOCYTES # BLD AUTO: 0.62 X10*3/UL (ref 0.8–3)
LYMPHOCYTES NFR BLD AUTO: 8.3 %
MAGNESIUM SERPL-MCNC: 1.59 MG/DL (ref 1.6–2.4)
MCH RBC QN AUTO: 31.7 PG (ref 26–34)
MCHC RBC AUTO-ENTMCNC: 32.1 G/DL (ref 32–36)
MCV RBC AUTO: 99 FL (ref 80–100)
MONOCYTES # BLD AUTO: 1.09 X10*3/UL (ref 0.05–0.8)
MONOCYTES NFR BLD AUTO: 14.5 %
NEUTROPHILS # BLD AUTO: 5.72 X10*3/UL (ref 1.6–5.5)
NEUTROPHILS NFR BLD AUTO: 76.2 %
NRBC BLD-RTO: 0 /100 WBCS (ref 0–0)
PLATELET # BLD AUTO: 217 X10*3/UL (ref 150–450)
POTASSIUM SERPL-SCNC: 3.5 MMOL/L (ref 3.5–5.3)
PROT SERPL-MCNC: 7 G/DL (ref 6.4–8.2)
RBC # BLD AUTO: 3.63 X10*6/UL (ref 4.5–5.9)
SODIUM SERPL-SCNC: 132 MMOL/L (ref 136–145)
WBC # BLD AUTO: 7.5 X10*3/UL (ref 4.4–11.3)

## 2024-06-19 PROCEDURE — 2500000005 HC RX 250 GENERAL PHARMACY W/O HCPCS: Performed by: INTERNAL MEDICINE

## 2024-06-19 PROCEDURE — 80053 COMPREHEN METABOLIC PANEL: CPT

## 2024-06-19 PROCEDURE — 2500000001 HC RX 250 WO HCPCS SELF ADMINISTERED DRUGS (ALT 637 FOR MEDICARE OP)

## 2024-06-19 PROCEDURE — 2500000002 HC RX 250 W HCPCS SELF ADMINISTERED DRUGS (ALT 637 FOR MEDICARE OP, ALT 636 FOR OP/ED): Mod: MUE

## 2024-06-19 PROCEDURE — 2500000002 HC RX 250 W HCPCS SELF ADMINISTERED DRUGS (ALT 637 FOR MEDICARE OP, ALT 636 FOR OP/ED)

## 2024-06-19 PROCEDURE — 83735 ASSAY OF MAGNESIUM: CPT

## 2024-06-19 PROCEDURE — 93978 VASCULAR STUDY: CPT | Performed by: INTERNAL MEDICINE

## 2024-06-19 PROCEDURE — RXMED WILLOW AMBULATORY MEDICATION CHARGE

## 2024-06-19 PROCEDURE — 36415 COLL VENOUS BLD VENIPUNCTURE: CPT

## 2024-06-19 PROCEDURE — 2500000002 HC RX 250 W HCPCS SELF ADMINISTERED DRUGS (ALT 637 FOR MEDICARE OP, ALT 636 FOR OP/ED): Mod: MUE | Performed by: INTERNAL MEDICINE

## 2024-06-19 PROCEDURE — 99233 SBSQ HOSP IP/OBS HIGH 50: CPT | Performed by: INTERNAL MEDICINE

## 2024-06-19 PROCEDURE — 2500000004 HC RX 250 GENERAL PHARMACY W/ HCPCS (ALT 636 FOR OP/ED)

## 2024-06-19 PROCEDURE — 93922 UPR/L XTREMITY ART 2 LEVELS: CPT | Performed by: INTERNAL MEDICINE

## 2024-06-19 PROCEDURE — 85025 COMPLETE CBC W/AUTO DIFF WBC: CPT

## 2024-06-19 PROCEDURE — 93978 VASCULAR STUDY: CPT

## 2024-06-19 PROCEDURE — 99238 HOSP IP/OBS DSCHRG MGMT 30/<: CPT

## 2024-06-19 PROCEDURE — S4991 NICOTINE PATCH NONLEGEND: HCPCS

## 2024-06-19 PROCEDURE — 2500000002 HC RX 250 W HCPCS SELF ADMINISTERED DRUGS (ALT 637 FOR MEDICARE OP, ALT 636 FOR OP/ED): Performed by: INTERNAL MEDICINE

## 2024-06-19 PROCEDURE — 94640 AIRWAY INHALATION TREATMENT: CPT | Mod: MUE

## 2024-06-19 PROCEDURE — 93922 UPR/L XTREMITY ART 2 LEVELS: CPT

## 2024-06-19 RX ORDER — LANOLIN ALCOHOL/MO/W.PET/CERES
400 CREAM (GRAM) TOPICAL DAILY
Qty: 30 TABLET | Refills: 0 | Status: SHIPPED | OUTPATIENT
Start: 2024-06-19

## 2024-06-19 RX ORDER — SPIRONOLACTONE 25 MG/1
25 TABLET ORAL 2 TIMES DAILY
Status: DISCONTINUED | OUTPATIENT
Start: 2024-06-19 | End: 2024-06-19 | Stop reason: HOSPADM

## 2024-06-19 RX ORDER — SPIRONOLACTONE 25 MG/1
12.5 TABLET ORAL 2 TIMES DAILY
Qty: 30 TABLET | Refills: 0 | Status: SHIPPED | OUTPATIENT
Start: 2024-06-19 | End: 2024-07-20

## 2024-06-19 RX ORDER — METOPROLOL SUCCINATE 25 MG/1
25 TABLET, EXTENDED RELEASE ORAL DAILY
Qty: 30 TABLET | Refills: 0 | Status: SHIPPED | OUTPATIENT
Start: 2024-06-19 | End: 2024-07-20

## 2024-06-19 RX ORDER — AMIODARONE HYDROCHLORIDE 200 MG/1
200 TABLET ORAL DAILY
Qty: 30 TABLET | Refills: 0 | Status: SHIPPED | OUTPATIENT
Start: 2024-06-20 | End: 2024-07-20

## 2024-06-19 RX ORDER — MAGNESIUM SULFATE HEPTAHYDRATE 40 MG/ML
2 INJECTION, SOLUTION INTRAVENOUS ONCE
Status: COMPLETED | OUTPATIENT
Start: 2024-06-19 | End: 2024-06-19

## 2024-06-19 RX ORDER — BUMETANIDE 1 MG/1
3 TABLET ORAL
Qty: 180 TABLET | Refills: 0 | Status: SHIPPED | OUTPATIENT
Start: 2024-06-19 | End: 2024-07-19

## 2024-06-19 RX ORDER — ATORVASTATIN CALCIUM 20 MG/1
20 TABLET, FILM COATED ORAL DAILY
Qty: 30 TABLET | Refills: 0 | Status: SHIPPED | OUTPATIENT
Start: 2024-06-19 | End: 2024-07-20

## 2024-06-19 RX ORDER — FLUTICASONE PROPIONATE 50 MCG
2 SPRAY, SUSPENSION (ML) NASAL DAILY
Qty: 16 G | Refills: 1 | Status: SHIPPED | OUTPATIENT
Start: 2024-06-19 | End: 2024-07-20

## 2024-06-19 RX ORDER — ISOSORBIDE MONONITRATE 30 MG/1
30 TABLET, EXTENDED RELEASE ORAL DAILY
Qty: 30 TABLET | Refills: 0 | Status: SHIPPED | OUTPATIENT
Start: 2024-06-19 | End: 2024-07-19

## 2024-06-19 RX ORDER — POTASSIUM CHLORIDE 20 MEQ/1
40 TABLET, EXTENDED RELEASE ORAL 2 TIMES DAILY
Status: DISCONTINUED | OUTPATIENT
Start: 2024-06-19 | End: 2024-06-19 | Stop reason: HOSPADM

## 2024-06-19 RX ADMIN — Medication 1 TABLET: at 09:23

## 2024-06-19 RX ADMIN — Medication 2 SPRAY: at 09:26

## 2024-06-19 RX ADMIN — MAGNESIUM SULFATE HEPTAHYDRATE 2 G: 40 INJECTION, SOLUTION INTRAVENOUS at 09:09

## 2024-06-19 RX ADMIN — ENOXAPARIN SODIUM 40 MG: 40 INJECTION SUBCUTANEOUS at 09:21

## 2024-06-19 RX ADMIN — METOPROLOL SUCCINATE 25 MG: 25 TABLET, EXTENDED RELEASE ORAL at 09:24

## 2024-06-19 RX ADMIN — SPIRONOLACTONE 25 MG: 25 TABLET ORAL at 09:24

## 2024-06-19 RX ADMIN — AMIODARONE HYDROCHLORIDE 200 MG: 200 TABLET ORAL at 09:26

## 2024-06-19 RX ADMIN — Medication 2 L/MIN: at 09:15

## 2024-06-19 RX ADMIN — IPRATROPIUM BROMIDE AND ALBUTEROL SULFATE 3 ML: 2.5; .5 SOLUTION RESPIRATORY (INHALATION) at 13:13

## 2024-06-19 RX ADMIN — FLUTICASONE PROPIONATE 2 SPRAY: 50 SPRAY, METERED NASAL at 09:30

## 2024-06-19 RX ADMIN — THIAMINE HCL TAB 100 MG 100 MG: 100 TAB at 09:23

## 2024-06-19 RX ADMIN — Medication 2 L/MIN: at 13:18

## 2024-06-19 RX ADMIN — FOLIC ACID 1 MG: 1 TABLET ORAL at 09:23

## 2024-06-19 RX ADMIN — IPRATROPIUM BROMIDE AND ALBUTEROL SULFATE 3 ML: 2.5; .5 SOLUTION RESPIRATORY (INHALATION) at 09:12

## 2024-06-19 RX ADMIN — ISOSORBIDE MONONITRATE 15 MG: 30 TABLET, EXTENDED RELEASE ORAL at 09:23

## 2024-06-19 RX ADMIN — NICOTINE 1 PATCH: 21 PATCH, EXTENDED RELEASE TRANSDERMAL at 09:21

## 2024-06-19 RX ADMIN — EMPAGLIFLOZIN 12.5 MG: 25 TABLET, FILM COATED ORAL at 09:28

## 2024-06-19 RX ADMIN — BUMETANIDE 3 MG: 1 TABLET ORAL at 09:09

## 2024-06-19 RX ADMIN — LAMOTRIGINE 100 MG: 100 TABLET ORAL at 09:28

## 2024-06-19 RX ADMIN — POTASSIUM CHLORIDE 40 MEQ: 1500 TABLET, EXTENDED RELEASE ORAL at 09:24

## 2024-06-19 ASSESSMENT — PAIN SCALES - GENERAL
PAINLEVEL_OUTOF10: 0 - NO PAIN

## 2024-06-19 ASSESSMENT — PAIN - FUNCTIONAL ASSESSMENT
PAIN_FUNCTIONAL_ASSESSMENT: 0-10

## 2024-06-19 NOTE — DISCHARGE INSTR - AVS FIRST PAGE
Heart Failure Discharge Instructions  1. Weigh yourself daily and record on your weight calendars.  2. If you gain more than 2 or 3 pounds overnight or 5 pounds in 5 days, call your cardiologist (Dr. Vinson [phone] - for now).  3. Follow a low sodium diet. No more than 2000 mg in one day, or more than 700 mg per meal.  4. Limit total fluids to no more than 6 cups (or 1.5 liters) per day - this includes all fluids (water, coffee, juice, milk, tea, etc.)  5. Monitor your blood pressure and heart rate in the morning, then take your meds, than check blood pressure and heart rate a few hours later and record on your calendars.  6. Be sure to see your cardiologist in one week after discharge. Call to schedule your follow-up appointments when you get home if they were not already scheduled for you.  7. Keep your follow-up appointments! Bring your weight calendars with you so the doctors can see your weight trend and blood pressure readings.  8. Be sure to  any new prescriptions and take them as directed. If unsure of the medications, be sure to call your cardiologist.  9. Stay as active as you can tolerate.   10. If you notice subtle change of symptoms (slight increase in swelling, slight shortness of breath, a new intolerance to lying flat, a new cough), be sure to call your cardiologist.  11. If you have any questions or concerns or you have not heard back from the cardiologist, feel free to call Tila Ca heart failure navigator at [phone].   12. Reduce/abstain from alcohol intake, Talk to your [facility] for medication adjustments.

## 2024-06-19 NOTE — PROGRESS NOTES
Subjective:   Reports dyspnea is improved.  The patient denies chest discomfort, palpitations, orthopnea, PND, syncope, and near syncope       Last Recorded Vitals:  Vitals:    06/19/24 0300 06/19/24 0400 06/19/24 0500 06/19/24 0600   BP: 113/55 119/53 118/62 123/65   BP Location:  Right arm     Patient Position:  Lying     Pulse: 74 76 78 86   Resp: (!) 28 (!) 29 25 (!) 27   Temp:  35.8 °C (96.4 °F)     TempSrc:  Temporal     SpO2: 94% 96% 95% 94%   Weight:    94.9 kg (209 lb 3.5 oz)   Height:         Weight         6/16/2024  0330 6/16/2024  2320 6/17/2024  0552 6/18/2024  0549 6/19/2024  0600    Weight: 95 kg (209 lb 7 oz) 94.8 kg (208 lb 15.9 oz) 94.7 kg (208 lb 12.4 oz) 94.1 kg (207 lb 8 oz) 94.9 kg (209 lb 3.5 oz)            Intake/Output Summary (Last 24 hours) at 6/19/2024 0903  Last data filed at 6/19/2024 0600  Gross per 24 hour   Intake 1824.27 ml   Output 3700 ml   Net -1875.73 ml         Last Labs:    Results from last 7 days   Lab Units 06/19/24  0348   SODIUM mmol/L 132*   POTASSIUM mmol/L 3.5   CHLORIDE mmol/L 89*   CO2 mmol/L 36*   BUN mg/dL 25*   CREATININE mg/dL 0.83   CALCIUM mg/dL 8.9   PROTEIN TOTAL g/dL 7.0   BILIRUBIN TOTAL mg/dL 0.4   ALK PHOS U/L 56   ALT U/L 9*   AST U/L 19   GLUCOSE mg/dL 110*        Results for orders placed or performed during the hospital encounter of 06/14/24 (from the past 24 hour(s))   Magnesium   Result Value Ref Range    Magnesium 1.59 (L) 1.60 - 2.40 mg/dL   CBC and Auto Differential   Result Value Ref Range    WBC 7.5 4.4 - 11.3 x10*3/uL    nRBC 0.0 0.0 - 0.0 /100 WBCs    RBC 3.63 (L) 4.50 - 5.90 x10*6/uL    Hemoglobin 11.5 (L) 13.5 - 17.5 g/dL    Hematocrit 35.8 (L) 41.0 - 52.0 %    MCV 99 80 - 100 fL    MCH 31.7 26.0 - 34.0 pg    MCHC 32.1 32.0 - 36.0 g/dL    RDW 13.2 11.5 - 14.5 %    Platelets 217 150 - 450 x10*3/uL    Neutrophils % 76.2 40.0 - 80.0 %    Immature Granulocytes %, Automated 0.4 0.0 - 0.9 %    Lymphocytes % 8.3 13.0 - 44.0 %    Monocytes % 14.5  2.0 - 10.0 %    Eosinophils % 0.3 0.0 - 6.0 %    Basophils % 0.3 0.0 - 2.0 %    Neutrophils Absolute 5.72 (H) 1.60 - 5.50 x10*3/uL    Immature Granulocytes Absolute, Automated 0.03 0.00 - 0.50 x10*3/uL    Lymphocytes Absolute 0.62 (L) 0.80 - 3.00 x10*3/uL    Monocytes Absolute 1.09 (H) 0.05 - 0.80 x10*3/uL    Eosinophils Absolute 0.02 0.00 - 0.40 x10*3/uL    Basophils Absolute 0.02 0.00 - 0.10 x10*3/uL   Comprehensive Metabolic Panel   Result Value Ref Range    Glucose 110 (H) 74 - 99 mg/dL    Sodium 132 (L) 136 - 145 mmol/L    Potassium 3.5 3.5 - 5.3 mmol/L    Chloride 89 (L) 98 - 107 mmol/L    Bicarbonate 36 (H) 21 - 32 mmol/L    Anion Gap 11 10 - 20 mmol/L    Urea Nitrogen 25 (H) 6 - 23 mg/dL    Creatinine 0.83 0.50 - 1.30 mg/dL    eGFR >90 >60 mL/min/1.73m*2    Calcium 8.9 8.6 - 10.3 mg/dL    Albumin 3.6 3.4 - 5.0 g/dL    Alkaline Phosphatase 56 33 - 136 U/L    Total Protein 7.0 6.4 - 8.2 g/dL    AST 19 9 - 39 U/L    Bilirubin, Total 0.4 0.0 - 1.2 mg/dL    ALT 9 (L) 10 - 52 U/L         PTT - No results in last year.  1.2   13.8 _     Troponin I, High Sensitivity   Date/Time Value Ref Range Status   06/14/2024 09:54 PM 83 (HH) 0 - 20 ng/L Final     Comment:     Previous result verified on 6/14/2024 2121 on specimen/case [id number] called with component [facility] for procedure Troponin I, High Sensitivity, Initial with value 81 ng/L.   06/14/2024 08:32 PM 81 (HH) 0 - 20 ng/L Final     Troponin I   Date/Time Value Ref Range Status   01/08/2023 11:11 PM 41 (H) 0 - 20 ng/L Final     Comment:     .  Less than 99th percentile of normal range cutoff-  Female and children under 18 years old <14 ng/L; Male <21 ng/L: Negative  Repeat testing should be performed if clinically indicated.   .  Female and children under 18 years old 14-50 ng/L; Male 21-50 ng/L:  Consistent with possible cardiac damage and possible increased clinical   risk. Serial measurements may help to assess extent of myocardial damage.   .  >50 ng/L:  Consistent with cardiac damage, increased clinical risk and  myocardial infarction. Serial measurements may help assess extent of   myocardial damage.   .   NOTE: Children less than 1 year old may have higher baseline troponin   levels and results should be interpreted in conjunction with the overall   clinical context.   .  NOTE: Troponin I testing is performed using a different   testing methodology at [facility] than at other   [facility]. Direct result comparisons should only   be made within the same method.     01/08/2023 07:50 PM 43 (H) 0 - 20 ng/L Final     Comment:     .  Less than 99th percentile of normal range cutoff-  Female and children under 18 years old <14 ng/L; Male <21 ng/L: Negative  Repeat testing should be performed if clinically indicated.   .  Female and children under 18 years old 14-50 ng/L; Male 21-50 ng/L:  Consistent with possible cardiac damage and possible increased clinical   risk. Serial measurements may help to assess extent of myocardial damage.   .  >50 ng/L: Consistent with cardiac damage, increased clinical risk and  myocardial infarction. Serial measurements may help assess extent of   myocardial damage.   .   NOTE: Children less than 1 year old may have higher baseline troponin   levels and results should be interpreted in conjunction with the overall   clinical context.   .  NOTE: Troponin I testing is performed using a different   testing methodology at [facility] than at other   [facility]. Direct result comparisons should only   be made within the same method.     01/08/2023 05:27 PM 44 (H) 0 - 20 ng/L Final     Comment:     .  Less than 99th percentile of normal range cutoff-  Female and children under 18 years old <14 ng/L; Male <21 ng/L: Negative  Repeat testing should be performed if clinically indicated.   .  Female and children under 18 years old 14-50 ng/L; Male 21-50 ng/L:  Consistent with possible cardiac damage and possible  increased clinical   risk. Serial measurements may help to assess extent of myocardial damage.   .  >50 ng/L: Consistent with cardiac damage, increased clinical risk and  myocardial infarction. Serial measurements may help assess extent of   myocardial damage.   .   NOTE: Children less than 1 year old may have higher baseline troponin   levels and results should be interpreted in conjunction with the overall   clinical context.   .  NOTE: Troponin I testing is performed using a different   testing methodology at [facility] than at other   system hospitals. Direct result comparisons should only   be made within the same method.       BNP   Date/Time Value Ref Range Status   06/14/2024 08:32 PM 1,747 (H) 0 - 99 pg/mL Final   01/12/2023 06:40 AM 1,205 (H) 0 - 99 pg/mL Final     Comment:     .  <100 pg/mL - Heart failure unlikely  100-299 pg/mL - Intermediate probability of acute heart  .               failure exacerbation. Correlate with clinical  .               context and patient history.    >=300 pg/mL - Heart Failure likely. Correlate with clinical  .               context and patient history.  BNP testing is performed using different testing   methodology at [facility] than at other   [facility] hospitals. Direct result comparisons should   only be made within the same method.     01/08/2023 04:22 PM 1,813 (H) 0 - 99 pg/mL Final     Comment:     .  <100 pg/mL - Heart failure unlikely  100-299 pg/mL - Intermediate probability of acute heart  .               failure exacerbation. Correlate with clinical  .               context and patient history.    >=300 pg/mL - Heart Failure likely. Correlate with clinical  .               context and patient history.  BNP testing is performed using different testing   methodology at [facility] than at other   [facility] hospitals. Direct result comparisons should   only be made within the same method.       Hemoglobin A1C   Date/Time Value Ref  Range Status   01/08/2023 11:11 PM 5.5 % Final     Comment:          Diagnosis of Diabetes-Adults   Non-Diabetic: < or = 5.6%   Increased risk for developing diabetes: 5.7-6.4%   Diagnostic of diabetes: > or = 6.5%  .       Monitoring of Diabetes                Age (y)     Therapeutic Goal (%)   Adults:          >18           <7.0   Pediatrics:    13-18           <7.5                   7-12           <8.0                   0- 6            7.5-8.5   American Diabetes Association. Diabetes Care 33(S1), Jan 2010.       VLDL   Date/Time Value Ref Range Status   01/08/2023 11:11 PM 10 0 - 40 mg/dL Final      Last I/O:  I/O last 3 completed shifts:  In: 3264.3 (34.4 mL/kg) [P.O.:2860; I.V.:404.3 (4.3 mL/kg)]  Out: 5800 (61.1 mL/kg) [Urine:5800 (1.7 mL/kg/hr)]  Weight: 94.9 kg     Allergies:  Losartan    Inpatient Medications:  Scheduled medications   Medication Dose Route Frequency    amiodarone  200 mg oral Daily    atorvastatin  20 mg oral Daily    bumetanide  3 mg oral BID    empagliflozin  12.5 mg oral Daily    enoxaparin  40 mg subcutaneous q24h    fluticasone  2 spray Each Nostril Daily    folic acid  1 mg oral Daily    ipratropium-albuteroL  3 mL nebulization 4x daily    isosorbide mononitrate ER  15 mg oral Daily    lamoTRIgine  100 mg oral Daily    lidocaine  1 Application urethral Once    magnesium sulfate  2 g intravenous Once    metoprolol succinate XL  25 mg oral Daily    multivitamin with minerals  1 tablet oral Daily    nicotine  1 patch transdermal Daily    Followed by    [START ON 7/27/2024] nicotine  1 patch transdermal Daily    Followed by    [START ON 8/10/2024] nicotine  1 patch transdermal Daily    polyethylene glycol  17 g oral Daily    potassium chloride CR  40 mEq oral BID    spironolactone  25 mg oral Daily    thiamine  100 mg oral Daily     PRN medications   Medication    acetaminophen    guaiFENesin    ipratropium-albuteroL    oxygen    oxymetazoline     Continuous Medications   Medication Dose  Last Rate     Outpatient Medications:  Current Outpatient Medications   Medication Instructions    atorvastatin (LIPITOR) 20 mg, oral, Daily    empagliflozin (JARDIANCE) 12.5 mg, oral, Daily    fluticasone propion-salmeteroL (Advair Diskus) 250-50 mcg/dose diskus inhaler 1 puff, inhalation, 2 times daily RT, Rinse mouth with water after use to reduce aftertaste and incidence of candidiasis. Do not swallow.    isosorbide mononitrate ER (IMDUR) 15 mg, oral, Daily, Do not crush or chew.    lamoTRIgine (LAMICTAL) 100 mg, oral, Daily    metoprolol succinate XL (TOPROL-XL) 25 mg, oral, Daily    nicotine (Nicoderm CQ) 7 mg/24 hr patch 1 patch, transdermal, Every 24 hours    nicotine polacrilex (COMMIT) 2 mg, Mouth/Throat, As needed    spironolactone (ALDACTONE) 12.5 mg, oral, Daily       Current Imaging  Electrocardiogram, 12-lead PRN ACS symptoms    Result Date: 6/18/2024  Sinus rhythm with occasional Premature atrial complexes Inferior infarct (cited on or before 25-JUL-2021) Abnormal ECG When compared with ECG of 16-JUN-2024 10:04, (unconfirmed) Premature atrial complexes are no longer Present Non-specific change in ST segment in Anterior leads Nonspecific T wave abnormality no longer evident in Lateral leads Confirmed by Saad Billingsley ([id number]) on 6/18/2024 7:11:47 PM      Physical Exam:  HEENT: No xanthelasma  NECK: Supple, no palpable adenopathy or thyromegaly  CHEST: Clear to auscultation, respiratory effort unlabored  CARDIAC: RRR, normal S1 and S2, no audible murmur, rub, gallop, carotids are brisk, PMI is not displaced  ABD: Active bowel sounds, nontender, no organomegaly, no evidence of ascites  EXT: No clubbing, cyanosis, edema, or tenderness  NEURO: Awake, alert, appropriate, speech is fluent       Assessment/Plan   1.  Acute on chronic systolic and diastolic heart failure, precipitated by medication noncompliance and alcohol abuse  2.  Alcohol abuse  3.  Ischemic cardiomyopathy LVEF 35%  4.  Nonsustained  ventricular tachycardia  5.  Mild aortic stenosis  6.  Noncompliance  7.  COPD  8.  DNR    Plan:  1.  Increase spironolactone  2.  Noninvasive vascular studies of the lower extremity have been ordered  3.  Heart failure teaching, sodium restriction  4.  I will be available to provide care to him for the next 30 days, after which I am dismissing him from my practice.  With nurse Najma present, I gave him written documentation of this.    Code Status:  DNR      Renato Mora MD

## 2024-06-19 NOTE — DISCHARGE INSTR - ACTIVITY
Keep legs elevated as much as possible while sitting.  Keep compression hose on and have your friend help you change them. They can be hand-washed.

## 2024-06-19 NOTE — DISCHARGE INSTRUCTIONS
You were admitted to the hospital because of exacerbation of heart failure.     New Medications:  -Amiodarone, 200 mg, 1 tablet once daily for your to keep your heart in normal rhythm.  -Bumetanide 1 mg, 3 tablets, twice daily to help with getting drained of excess fluid in your body.  -Oxygen, 2 to 4 L through nasal cannula to keep pulse ox more than 92%.  -Flonase, 2 sprays each nostrils once daily to help with nasal congestion and improves oxygen delivery.    Please continue taking your other home medications as prescribed.    It is very important to take your medications as prescribed to decrease the chance of having exacerbation in the future.  Also, it is very important to have dietary restrictions regarding fluid intake and salt for the same reason.    For medication refills, please contact your primary care doctor.     Please call your primary care doctor in 2-3 days to schedule follow up appointment for this hospitalization and further management of your care.     You need to establish care with a new cardiologist.  Please reach out to your primary doctor to help you out.    If you get worse, or develop any concerning or worrisome symptoms call your Primary Care Doctor or return to the Emergency Department.     -[facility] - Teaching Service

## 2024-06-19 NOTE — PROGRESS NOTES
Physical Therapy                 Therapy Communication Note    Patient Name: Milton Lane  MRN: [id number]  Today's Date: 6/19/2024     Discipline: Physical Therapy    Room 2122    Missed Time:   Missed Visit Reason:   Comment:     06/19/24 1150   General   Missed Visit Yes   Missed Visit Reason Other (Comment)    Nursing was in pt. at this time. WIll see the next avaiable time.

## 2024-06-19 NOTE — DISCHARGE SUMMARY
Discharge Diagnosis  Acute on chronic combined systolic (congestive) and diastolic (congestive) heart failure (Multi)    Issues Requiring Follow-Up  -PCP for possible hospitalization follow-up  -Establish care with a new cardiologist as current cardiologist, Dr. Mora, wants to dismiss him from practice after 30 days.    Discharge Meds     Your medication list        START taking these medications        Instructions Last Dose Given Next Dose Due   amiodarone 200 mg tablet  Commonly known as: Pacerone  Start taking on: June 20, 2024      Take 1 tablet (200 mg) by mouth once daily.       bumetanide 1 mg tablet  Commonly known as: Bumex      Take 3 tablets (3 mg) by mouth 2 times daily (morning and late afternoon).       fluticasone 50 mcg/actuation nasal spray  Commonly known as: Flonase      Administer 2 sprays into each nostril once daily. Shake gently. Before first use, prime pump. After use, clean tip and replace cap.       oxygen gas therapy  Commonly known as: O2      Inhale 1 each continuously.              CHANGE how you take these medications        Instructions Last Dose Given Next Dose Due   spironolactone 25 mg tablet  Commonly known as: Aldactone  What changed: when to take this      Take 0.5 tablets (12.5 mg) by mouth 2 times a day.              CONTINUE taking these medications        Instructions Last Dose Given Next Dose Due   atorvastatin 20 mg tablet  Commonly known as: Lipitor           empagliflozin 25 mg  Commonly known as: Jardiance           fluticasone propion-salmeteroL 250-50 mcg/dose diskus inhaler  Commonly known as: Advair Diskus           isosorbide mononitrate ER 30 mg 24 hr tablet  Commonly known as: Imdur           lamoTRIgine 100 mg tablet  Commonly known as: LaMICtal           metoprolol succinate XL 25 mg 24 hr tablet  Commonly known as: Toprol-XL           nicotine 7 mg/24 hr patch  Commonly known as: Nicoderm CQ           nicotine polacrilex 2 mg lozenge  Commonly known as:  Commit                     Where to Get Your Medications        These medications were sent to [facility]  [address], [address], [facility] [address]      Hours: 8:30 AM to 5 PM Mon-Fri, 9 AM to 1 PM Sat Phone: [phone]   amiodarone 200 mg tablet  bumetanide 1 mg tablet  fluticasone 50 mcg/actuation nasal spray  spironolactone 25 mg tablet       Information about where to get these medications is not yet available    Ask your nurse or doctor about these medications  oxygen gas therapy         Test Results Pending At Discharge  Pending Labs       No current pending labs.            Hospital Course  Mr. Lane is a 74-year-old male who presents to [facility] ED c/o ROWELL (unable to walk more than 15 feet on rollator) and worsening b/l LE edema for about 3 week duration.  Found to have acute hypoxic respiratory failure for which she got diuresing and responded well (about 6 L out during admission).  He has a history of medication noncompliance and dietary indiscretion unlikely this is contributing to his exacerbation.  After having discussion with cardiology, changes have been made to his medications and no invasive procedures per patient wishes.  Also, his current cardiologist was dismissed him from practice after 30 days. During hospitalization, he had nonsustained V. tach for which he was started on discharged on amiodarone per cardiology recommendations.  Due to difficulty voiding off oxygen, he discharged on home oxygen. Patient discharged in stable condition and encouraged to be compliant with medications and watch out his diet to decrease risk of readmissions.    Pertinent Physical Exam At Time of Discharge  Physical Exam  General: Obese, well nourished, A&Ox3, conversational, lying comfortably in bed, not in pain or distress.  On 2 L nasal cannula  HEENT: Mucous membranes moist.  Head/Neck: Atraumatic, Normocephalic. Neck supple.   Respiratory: Decreased air entry at lung bases but no  crackles/wheezing appreciated.  Cardiovascular: regular rhythm, normal S1 and S2. No added sounds or murmurs,  Gastrointestinal: soft, non-tender abdomen, bowel sounds present, no guarding or rebound.   Extremities: + 2 pitting edema up to the knees bilaterally.  Neurological:  no focal neurologic deficits.  Psychological: Appropriate mood, normal affect.    Outpatient Follow-Up  No future appointments.      Fernando Kahn MD

## 2024-06-19 NOTE — PROGRESS NOTES
Met with pt to discuss dc recommendations and needs. Pt confirmed again that he will not go to any level of rehab nor does he feel he needs [facility] services. Pt confirmed all previous info provided by initial Care Transitions assessment. He had [employer] [facility] however has new referral for  meals on wheels program. Confirmed with dietician Sandee that she will notify them of planned dc for today. Pt denied any further needs at this time. Updated TCC.     JIMENEZ Myers

## 2024-06-19 NOTE — PROGRESS NOTES
Home Oxygen Evaluation       Date/Time Oxygen Action Oxygen Dose SpO2 Patient Activity During SpO2 Measurement Medical Gas Therapy O2 Delivery Method    06/19/24 1210 -- -- 87 % At rest None (Room air) --    06/19/24 1211 -- 2L nasal cannula 93 % At rest Supplemental oxygen Nasal cannula  2L nasal cannula            Was a Home Oxygen Evaluation Performed? Yes     Based on the Home Oxygen Evaluation, Does the Patient Qualify for Home Oxygen Therapy? Yes                Recommendations:               Study reviewed and agreed.  Fernando Kahn MD

## 2024-06-19 NOTE — DISCHARGE INSTR - DIET
Low sodium diet - no more than 700 mg per meal (2000 mg per day). Low sodium meals will be delivered M-W-F to you from [facility] through [employer].  No more than 6 cups (1.5 liters) daily.

## 2024-06-19 NOTE — PROGRESS NOTES
06/19/24 1201   Discharge Planning   Who is requesting discharge planning? Provider   Home or Post Acute Services Community services   Patient expects to be discharged to: home   Does the patient need discharge transport arranged? Yes   RoundTrip coordination needed? Yes   Has discharge transport been arranged? No     Spoke with patient regarding medications. [facility] and [facility] not able to pay for meds to beds today but pharmacy states that they will still bring meds to beds and will add amount to patient's account. Also may need O2 going home so respiratory will assess for that. TCC team to follow.

## 2024-06-19 NOTE — CARE PLAN
The clinical goals for the shift include patient will remain HDS throughout shift. The patient met this goal    S: Patient admitted 6/14 for CHF   B: History of: CHF, CAD, afib, HTN, bipolar disorder, PTSD, ETOH abuse   A: Patient A&Ox4, denies complaints of pain and shortness of breath. Remains NSR with RBBB and on 2.5 L NC. Medicated per orders throughout shift. Nevarez remains in place for acute retention.   R: Patient to return to Extended Stay upon discharge when medically ready

## 2024-06-19 NOTE — PROGRESS NOTES
Spiritual Care Visit    Clinical Encounter Type  Visited With: Patient  Routine Visit: Introduction  Continue Visiting: No                                            Taxonomy  Intended Effects: Preserve dignity and respect, Stefany affirmation, Promote sense of peace, Helping someone feel comforted  Methods: Offer spiritual/Yarsani support  Interventions: Share words of hope and inspiration    Patient used to be [employer].  Patient explained his spirituality as starting as a seed in the [employer] and now becoming a giant tree.  Patient shared he realizes he is also like a dying tree that does not have much life left.   listened to his beliefs and was a supportive presence.  Patient is alone and does not have friends or family.

## 2024-06-20 ENCOUNTER — PHARMACY VISIT (OUTPATIENT)
Dept: PHARMACY | Facility: CLINIC | Age: 74
End: 2024-06-20
Payer: COMMERCIAL

## 2024-06-20 LAB
AORTIC VALVE MEAN GRADIENT: 19 MMHG
AORTIC VALVE PEAK VELOCITY: 3.02 M/S
AV PEAK GRADIENT: 36.5 MMHG
AVA (PEAK VEL): 1.02 CM2
AVA (VTI): 1.23 CM2
EJECTION FRACTION APICAL 4 CHAMBER: 49.8
LEFT VENTRICLE INTERNAL DIMENSION DIASTOLE: 5.3 CM (ref 3.5–6)
LEFT VENTRICULAR OUTFLOW TRACT DIAMETER: 2 CM
LV EJECTION FRACTION BIPLANE: 40 %
MITRAL VALVE E/A RATIO: 1.4
MITRAL VALVE E/E' RATIO: 19.76
RIGHT VENTRICLE FREE WALL PEAK S': 14.7 CM/S
RIGHT VENTRICLE PEAK SYSTOLIC PRESSURE: 35.7 MMHG
TRICUSPID ANNULAR PLANE SYSTOLIC EXCURSION: 2 CM

## 2024-06-20 PROCEDURE — RXMED WILLOW AMBULATORY MEDICATION CHARGE

## 2024-06-20 NOTE — NURSING NOTE
7045:  Report received from previous shift RN.  See shift assessment, vital signs, care plan and education.  Chart check done.    0664:  Dr. Mora in to talk with pt.  Dr. Tidwell gave pt a letter stating that he would not be pt's doctor after 30 days due to pt being noncompliant.  See letter in chart.    1030;  Nevarez d/c'd    1015:  Dr. Vega in to see pt.    1415:  Pt down to biometerics.    1545:  Pt. Back from biometrics.    1545:  Bladder scanned pt for 110 ml.  Pt urinating 100cc at a time, but having difficulty even urinating the 100cc.  Notified Dr. Vega.  Pt to follow up with urology doctor after discharge concerning urination troubles.

## 2024-07-20 LAB
ATRIAL RATE: 70 BPM
ATRIAL RATE: 74 BPM
P AXIS: 39 DEGREES
P AXIS: 46 DEGREES
P OFFSET: 153 MS
P OFFSET: 180 MS
P ONSET: 119 MS
P ONSET: 126 MS
PR INTERVAL: 178 MS
PR INTERVAL: 190 MS
Q ONSET: 214 MS
Q ONSET: 215 MS
QRS COUNT: 11 BEATS
QRS COUNT: 13 BEATS
QRS DURATION: 90 MS
QRS DURATION: 98 MS
QT INTERVAL: 416 MS
QT INTERVAL: 420 MS
QTC CALCULATION(BAZETT): 453 MS
QTC CALCULATION(BAZETT): 461 MS
QTC FREDERICIA: 442 MS
QTC FREDERICIA: 446 MS
R AXIS: 104 DEGREES
R AXIS: 94 DEGREES
T AXIS: -24 DEGREES
T AXIS: -32 DEGREES
T OFFSET: 423 MS
T OFFSET: 424 MS
VENTRICULAR RATE: 70 BPM
VENTRICULAR RATE: 74 BPM

## 2024-08-01 ENCOUNTER — APPOINTMENT (OUTPATIENT)
Dept: CARDIOLOGY | Facility: CLINIC | Age: 74
End: 2024-08-01
Payer: MEDICARE

## 2024-08-02 LAB
ATRIAL RATE: 75 BPM
P AXIS: 38 DEGREES
P OFFSET: 172 MS
P ONSET: 122 MS
PR INTERVAL: 180 MS
Q ONSET: 212 MS
QRS COUNT: 13 BEATS
QRS DURATION: 102 MS
QT INTERVAL: 418 MS
QTC CALCULATION(BAZETT): 466 MS
QTC FREDERICIA: 450 MS
R AXIS: 81 DEGREES
T AXIS: -52 DEGREES
T OFFSET: 421 MS
VENTRICULAR RATE: 75 BPM

## 2024-08-07 ENCOUNTER — APPOINTMENT (OUTPATIENT)
Dept: CARDIOLOGY | Facility: HOSPITAL | Age: 74
End: 2024-08-07
Payer: MEDICARE

## 2024-08-07 ENCOUNTER — HOSPITAL ENCOUNTER (INPATIENT)
Facility: HOSPITAL | Age: 74
End: 2024-08-07
Attending: EMERGENCY MEDICINE | Admitting: INTERNAL MEDICINE
Payer: MEDICARE

## 2024-08-07 ENCOUNTER — APPOINTMENT (OUTPATIENT)
Dept: RADIOLOGY | Facility: HOSPITAL | Age: 74
End: 2024-08-07
Payer: MEDICARE

## 2024-08-07 DIAGNOSIS — N39.0 URINARY TRACT INFECTION WITHOUT HEMATURIA, SITE UNSPECIFIED: ICD-10-CM

## 2024-08-07 DIAGNOSIS — I50.41 ACUTE COMBINED SYSTOLIC AND DIASTOLIC CONGESTIVE HEART FAILURE (MULTI): ICD-10-CM

## 2024-08-07 DIAGNOSIS — E83.42 HYPOMAGNESEMIA: ICD-10-CM

## 2024-08-07 DIAGNOSIS — R82.90 ABNORMAL URINALYSIS: ICD-10-CM

## 2024-08-07 DIAGNOSIS — I25.10 CORONARY ARTERY DISEASE INVOLVING NATIVE CORONARY ARTERY OF NATIVE HEART WITHOUT ANGINA PECTORIS: ICD-10-CM

## 2024-08-07 DIAGNOSIS — I47.20 V TACH (MULTI): ICD-10-CM

## 2024-08-07 DIAGNOSIS — E87.1 HYPONATREMIA: Primary | ICD-10-CM

## 2024-08-07 DIAGNOSIS — R30.9 PAIN WITH URINATION: ICD-10-CM

## 2024-08-07 DIAGNOSIS — J44.1 ACUTE EXACERBATION OF CHRONIC OBSTRUCTIVE PULMONARY DISEASE (COPD) (MULTI): ICD-10-CM

## 2024-08-07 DIAGNOSIS — I27.20 PULMONARY HYPERTENSION (MULTI): ICD-10-CM

## 2024-08-07 DIAGNOSIS — I48.0 PAROXYSMAL ATRIAL FIBRILLATION (MULTI): ICD-10-CM

## 2024-08-07 DIAGNOSIS — R33.9 URINARY RETENTION: ICD-10-CM

## 2024-08-07 DIAGNOSIS — J81.0 ACUTE PULMONARY EDEMA (MULTI): ICD-10-CM

## 2024-08-07 DIAGNOSIS — R09.81 NASAL CONGESTION: ICD-10-CM

## 2024-08-07 DIAGNOSIS — I50.9 ACUTE ON CHRONIC HEART FAILURE, UNSPECIFIED HEART FAILURE TYPE (MULTI): ICD-10-CM

## 2024-08-07 PROBLEM — J96.21 ACUTE ON CHRONIC HYPOXIC RESPIRATORY FAILURE (MULTI): Status: ACTIVE | Noted: 2024-08-07

## 2024-08-07 PROBLEM — E87.70 HYPERVOLEMIA: Status: ACTIVE | Noted: 2024-08-07

## 2024-08-07 LAB
ALBUMIN SERPL BCP-MCNC: 3.9 G/DL (ref 3.4–5)
ALP SERPL-CCNC: 87 U/L (ref 33–136)
ALT SERPL W P-5'-P-CCNC: 12 U/L (ref 10–52)
ANION GAP BLDA CALCULATED.4IONS-SCNC: 9 MMO/L (ref 10–25)
ANION GAP BLDV CALCULATED.4IONS-SCNC: 7 MMOL/L (ref 10–25)
ANION GAP SERPL CALC-SCNC: 14 MMOL/L (ref 10–20)
ANION GAP SERPL CALC-SCNC: 15 MMOL/L (ref 10–20)
APPEARANCE UR: ABNORMAL
AST SERPL W P-5'-P-CCNC: 23 U/L (ref 9–39)
ATRIAL RATE: 54 BPM
BACTERIA #/AREA URNS AUTO: ABNORMAL /HPF
BASE EXCESS BLDA CALC-SCNC: 3.9 MMOL/L (ref -2–3)
BASE EXCESS BLDV CALC-SCNC: 3.9 MMOL/L (ref -2–3)
BASOPHILS # BLD AUTO: 0.02 X10*3/UL (ref 0–0.1)
BASOPHILS NFR BLD AUTO: 0.3 %
BILIRUB SERPL-MCNC: 1.3 MG/DL (ref 0–1.2)
BILIRUB UR STRIP.AUTO-MCNC: NEGATIVE MG/DL
BNP SERPL-MCNC: 2457 PG/ML (ref 0–99)
BODY TEMPERATURE: ABNORMAL
BODY TEMPERATURE: ABNORMAL
BUN SERPL-MCNC: 20 MG/DL (ref 6–23)
BUN SERPL-MCNC: 21 MG/DL (ref 6–23)
CA-I BLDA-SCNC: 1.11 MMOL/L (ref 1.1–1.33)
CA-I BLDV-SCNC: 1.07 MMOL/L (ref 1.1–1.33)
CALCIUM SERPL-MCNC: 8.9 MG/DL (ref 8.6–10.3)
CALCIUM SERPL-MCNC: 8.9 MG/DL (ref 8.6–10.3)
CARDIAC TROPONIN I PNL SERPL HS: 132 NG/L (ref 0–20)
CARDIAC TROPONIN I PNL SERPL HS: 72 NG/L (ref 0–20)
CARDIAC TROPONIN I PNL SERPL HS: 86 NG/L (ref 0–20)
CHLORIDE BLDA-SCNC: 82 MMOL/L (ref 98–107)
CHLORIDE BLDV-SCNC: 80 MMOL/L (ref 98–107)
CHLORIDE SERPL-SCNC: 76 MMOL/L (ref 98–107)
CHLORIDE SERPL-SCNC: 81 MMOL/L (ref 98–107)
CO2 SERPL-SCNC: 29 MMOL/L (ref 21–32)
CO2 SERPL-SCNC: 30 MMOL/L (ref 21–32)
COLOR UR: ABNORMAL
CREAT SERPL-MCNC: 0.79 MG/DL (ref 0.5–1.3)
CREAT SERPL-MCNC: 0.82 MG/DL (ref 0.5–1.3)
CRITICAL CALL TIME: 1326
CRITICAL CALL TIME: 1609
CRITICAL CALLED BY: ABNORMAL
CRITICAL CALLED BY: ABNORMAL
CRITICAL CALLED TO: ABNORMAL
CRITICAL CALLED TO: ABNORMAL
CRITICAL READ BACK: ABNORMAL
CRITICAL READ BACK: ABNORMAL
EGFRCR SERPLBLD CKD-EPI 2021: >90 ML/MIN/1.73M*2
EGFRCR SERPLBLD CKD-EPI 2021: >90 ML/MIN/1.73M*2
EOSINOPHIL # BLD AUTO: 0 X10*3/UL (ref 0–0.4)
EOSINOPHIL NFR BLD AUTO: 0 %
ERYTHROCYTE [DISTWIDTH] IN BLOOD BY AUTOMATED COUNT: 15.7 % (ref 11.5–14.5)
ETHANOL SERPL-MCNC: <10 MG/DL
GLUCOSE BLD MANUAL STRIP-MCNC: 133 MG/DL (ref 74–99)
GLUCOSE BLDA-MCNC: 130 MG/DL (ref 74–99)
GLUCOSE BLDV-MCNC: 129 MG/DL (ref 74–99)
GLUCOSE SERPL-MCNC: 124 MG/DL (ref 74–99)
GLUCOSE SERPL-MCNC: 138 MG/DL (ref 74–99)
GLUCOSE UR STRIP.AUTO-MCNC: ABNORMAL MG/DL
HCO3 BLDA-SCNC: 29.7 MMOL/L (ref 22–26)
HCO3 BLDV-SCNC: 28.5 MMOL/L (ref 22–26)
HCT VFR BLD AUTO: 35.2 % (ref 41–52)
HCT VFR BLD EST: 37 % (ref 41–52)
HCT VFR BLD EST: 38 % (ref 41–52)
HGB BLD-MCNC: 12 G/DL (ref 13.5–17.5)
HGB BLDA-MCNC: 12.2 G/DL (ref 13.5–17.5)
HGB BLDV-MCNC: 12.6 G/DL (ref 13.5–17.5)
IMM GRANULOCYTES # BLD AUTO: 0.05 X10*3/UL (ref 0–0.5)
IMM GRANULOCYTES NFR BLD AUTO: 0.7 % (ref 0–0.9)
INHALED O2 CONCENTRATION: 32 %
INHALED O2 CONCENTRATION: 50 %
INR PPP: 1.3 (ref 0.9–1.1)
KETONES UR STRIP.AUTO-MCNC: NEGATIVE MG/DL
LACTATE BLDA-SCNC: 1.3 MMOL/L (ref 0.4–2)
LACTATE BLDV-SCNC: 1.6 MMOL/L (ref 0.4–2)
LEUKOCYTE ESTERASE UR QL STRIP.AUTO: ABNORMAL
LYMPHOCYTES # BLD AUTO: 0.49 X10*3/UL (ref 0.8–3)
LYMPHOCYTES NFR BLD AUTO: 6.7 %
MAGNESIUM SERPL-MCNC: 1.8 MG/DL (ref 1.6–2.4)
MCH RBC QN AUTO: 30 PG (ref 26–34)
MCHC RBC AUTO-ENTMCNC: 34.1 G/DL (ref 32–36)
MCV RBC AUTO: 88 FL (ref 80–100)
MONOCYTES # BLD AUTO: 0.72 X10*3/UL (ref 0.05–0.8)
MONOCYTES NFR BLD AUTO: 9.8 %
MUCOUS THREADS #/AREA URNS AUTO: ABNORMAL /LPF
NEUTROPHILS # BLD AUTO: 6.08 X10*3/UL (ref 1.6–5.5)
NEUTROPHILS NFR BLD AUTO: 82.5 %
NITRITE UR QL STRIP.AUTO: ABNORMAL
NRBC BLD-RTO: 0 /100 WBCS (ref 0–0)
OXYHGB MFR BLDA: 96.4 % (ref 94–98)
OXYHGB MFR BLDV: 90.5 % (ref 45–75)
PCO2 BLDA: 49 MM HG (ref 38–42)
PCO2 BLDV: 42 MM HG (ref 41–51)
PH BLDA: 7.39 PH (ref 7.38–7.42)
PH BLDV: 7.44 PH (ref 7.33–7.43)
PH UR STRIP.AUTO: 7 [PH]
PLATELET # BLD AUTO: 248 X10*3/UL (ref 150–450)
PO2 BLDA: 119 MM HG (ref 85–95)
PO2 BLDV: 68 MM HG (ref 35–45)
POTASSIUM BLDA-SCNC: 4.4 MMOL/L (ref 3.5–5.3)
POTASSIUM BLDV-SCNC: 4.9 MMOL/L (ref 3.5–5.3)
POTASSIUM SERPL-SCNC: 4.4 MMOL/L (ref 3.5–5.3)
POTASSIUM SERPL-SCNC: 4.6 MMOL/L (ref 3.5–5.3)
PROT SERPL-MCNC: 7.8 G/DL (ref 6.4–8.2)
PROT UR STRIP.AUTO-MCNC: ABNORMAL MG/DL
PROTHROMBIN TIME: 14.2 SECONDS (ref 9.8–12.8)
Q ONSET: 209 MS
QRS COUNT: 13 BEATS
QRS DURATION: 108 MS
QT INTERVAL: 422 MS
QTC CALCULATION(BAZETT): 486 MS
QTC FREDERICIA: 464 MS
R AXIS: 97 DEGREES
RBC # BLD AUTO: 4 X10*6/UL (ref 4.5–5.9)
RBC # UR STRIP.AUTO: ABNORMAL /UL
RBC #/AREA URNS AUTO: ABNORMAL /HPF
SAO2 % BLDA: 100 % (ref 94–100)
SAO2 % BLDV: 94 % (ref 45–75)
SODIUM BLDA-SCNC: 116 MMOL/L (ref 136–145)
SODIUM BLDV-SCNC: 111 MMOL/L (ref 136–145)
SODIUM SERPL-SCNC: 115 MMOL/L (ref 136–145)
SODIUM SERPL-SCNC: 121 MMOL/L (ref 136–145)
SP GR UR STRIP.AUTO: 1
T AXIS: 33 DEGREES
T OFFSET: 420 MS
UROBILINOGEN UR STRIP.AUTO-MCNC: NORMAL MG/DL
VENTRICULAR RATE: 80 BPM
WBC # BLD AUTO: 7.4 X10*3/UL (ref 4.4–11.3)
WBC #/AREA URNS AUTO: >50 /HPF
WBC CLUMPS #/AREA URNS AUTO: ABNORMAL /HPF

## 2024-08-07 PROCEDURE — 94640 AIRWAY INHALATION TREATMENT: CPT

## 2024-08-07 PROCEDURE — 2500000004 HC RX 250 GENERAL PHARMACY W/ HCPCS (ALT 636 FOR OP/ED): Performed by: INTERNAL MEDICINE

## 2024-08-07 PROCEDURE — 85610 PROTHROMBIN TIME: CPT

## 2024-08-07 PROCEDURE — 83880 ASSAY OF NATRIURETIC PEPTIDE: CPT

## 2024-08-07 PROCEDURE — 2500000002 HC RX 250 W HCPCS SELF ADMINISTERED DRUGS (ALT 637 FOR MEDICARE OP, ALT 636 FOR OP/ED)

## 2024-08-07 PROCEDURE — 83735 ASSAY OF MAGNESIUM: CPT

## 2024-08-07 PROCEDURE — 99285 EMERGENCY DEPT VISIT HI MDM: CPT | Performed by: EMERGENCY MEDICINE

## 2024-08-07 PROCEDURE — 71045 X-RAY EXAM CHEST 1 VIEW: CPT | Performed by: RADIOLOGY

## 2024-08-07 PROCEDURE — 93005 ELECTROCARDIOGRAM TRACING: CPT

## 2024-08-07 PROCEDURE — 36415 COLL VENOUS BLD VENIPUNCTURE: CPT

## 2024-08-07 PROCEDURE — 84132 ASSAY OF SERUM POTASSIUM: CPT

## 2024-08-07 PROCEDURE — 2500000004 HC RX 250 GENERAL PHARMACY W/ HCPCS (ALT 636 FOR OP/ED)

## 2024-08-07 PROCEDURE — 99285 EMERGENCY DEPT VISIT HI MDM: CPT | Mod: 25

## 2024-08-07 PROCEDURE — 84484 ASSAY OF TROPONIN QUANT: CPT

## 2024-08-07 PROCEDURE — 2500000005 HC RX 250 GENERAL PHARMACY W/O HCPCS

## 2024-08-07 PROCEDURE — 2500000004 HC RX 250 GENERAL PHARMACY W/ HCPCS (ALT 636 FOR OP/ED): Performed by: NURSE PRACTITIONER

## 2024-08-07 PROCEDURE — 71045 X-RAY EXAM CHEST 1 VIEW: CPT | Mod: FOREIGN READ | Performed by: RADIOLOGY

## 2024-08-07 PROCEDURE — 82077 ASSAY SPEC XCP UR&BREATH IA: CPT

## 2024-08-07 PROCEDURE — 83930 ASSAY OF BLOOD OSMOLALITY: CPT | Mod: STJLAB

## 2024-08-07 PROCEDURE — 71045 X-RAY EXAM CHEST 1 VIEW: CPT

## 2024-08-07 PROCEDURE — 87186 SC STD MICRODIL/AGAR DIL: CPT | Mod: STJLAB

## 2024-08-07 PROCEDURE — 96374 THER/PROPH/DIAG INJ IV PUSH: CPT

## 2024-08-07 PROCEDURE — 84132 ASSAY OF SERUM POTASSIUM: CPT | Performed by: NURSE PRACTITIONER

## 2024-08-07 PROCEDURE — 87449 NOS EACH ORGANISM AG IA: CPT | Mod: STJLAB

## 2024-08-07 PROCEDURE — 70450 CT HEAD/BRAIN W/O DYE: CPT

## 2024-08-07 PROCEDURE — 5A09357 ASSISTANCE WITH RESPIRATORY VENTILATION, LESS THAN 24 CONSECUTIVE HOURS, CONTINUOUS POSITIVE AIRWAY PRESSURE: ICD-10-PCS | Performed by: INTERNAL MEDICINE

## 2024-08-07 PROCEDURE — 87086 URINE CULTURE/COLONY COUNT: CPT | Mod: STJLAB

## 2024-08-07 PROCEDURE — 2060000001 HC INTERMEDIATE ICU ROOM DAILY

## 2024-08-07 PROCEDURE — 84484 ASSAY OF TROPONIN QUANT: CPT | Performed by: NURSE PRACTITIONER

## 2024-08-07 PROCEDURE — 85025 COMPLETE CBC W/AUTO DIFF WBC: CPT

## 2024-08-07 PROCEDURE — 84145 PROCALCITONIN (PCT): CPT | Mod: STJLAB | Performed by: NURSE PRACTITIONER

## 2024-08-07 PROCEDURE — 82947 ASSAY GLUCOSE BLOOD QUANT: CPT

## 2024-08-07 PROCEDURE — 83935 ASSAY OF URINE OSMOLALITY: CPT | Mod: STJLAB

## 2024-08-07 PROCEDURE — 94660 CPAP INITIATION&MGMT: CPT

## 2024-08-07 PROCEDURE — 2500000002 HC RX 250 W HCPCS SELF ADMINISTERED DRUGS (ALT 637 FOR MEDICARE OP, ALT 636 FOR OP/ED): Performed by: INTERNAL MEDICINE

## 2024-08-07 PROCEDURE — 99223 1ST HOSP IP/OBS HIGH 75: CPT | Performed by: NURSE PRACTITIONER

## 2024-08-07 PROCEDURE — 2500000001 HC RX 250 WO HCPCS SELF ADMINISTERED DRUGS (ALT 637 FOR MEDICARE OP): Performed by: NURSE PRACTITIONER

## 2024-08-07 PROCEDURE — 81001 URINALYSIS AUTO W/SCOPE: CPT

## 2024-08-07 PROCEDURE — 70450 CT HEAD/BRAIN W/O DYE: CPT | Performed by: RADIOLOGY

## 2024-08-07 RX ORDER — INSULIN LISPRO 100 [IU]/ML
0-5 INJECTION, SOLUTION INTRAVENOUS; SUBCUTANEOUS
Status: DISPENSED | OUTPATIENT
Start: 2024-08-07

## 2024-08-07 RX ORDER — ALBUTEROL SULFATE 90 UG/1
2 INHALANT RESPIRATORY (INHALATION) EVERY 6 HOURS PRN
Status: ON HOLD | COMMUNITY

## 2024-08-07 RX ORDER — LORAZEPAM 2 MG/ML
1 INJECTION INTRAMUSCULAR EVERY 2 HOUR PRN
Status: ACTIVE | OUTPATIENT
Start: 2024-08-07

## 2024-08-07 RX ORDER — MENTHOL 105 MG/ML
1 SPRAY TOPICAL 2 TIMES DAILY PRN
Status: ON HOLD | COMMUNITY

## 2024-08-07 RX ORDER — IPRATROPIUM BROMIDE AND ALBUTEROL SULFATE 2.5; .5 MG/3ML; MG/3ML
3 SOLUTION RESPIRATORY (INHALATION) EVERY 20 MIN
Status: COMPLETED | OUTPATIENT
Start: 2024-08-07 | End: 2024-08-07

## 2024-08-07 RX ORDER — ACETAMINOPHEN 160 MG/5ML
650 SOLUTION ORAL EVERY 4 HOURS PRN
Status: ACTIVE | OUTPATIENT
Start: 2024-08-07

## 2024-08-07 RX ORDER — ALBUTEROL SULFATE 0.83 MG/ML
2.5 SOLUTION RESPIRATORY (INHALATION) EVERY 4 HOURS PRN
Status: ON HOLD | COMMUNITY

## 2024-08-07 RX ORDER — LANOLIN ALCOHOL/MO/W.PET/CERES
100 CREAM (GRAM) TOPICAL DAILY
Status: DISCONTINUED | OUTPATIENT
Start: 2024-08-10 | End: 2024-08-09

## 2024-08-07 RX ORDER — DEXTROSE 50 % IN WATER (D50W) INTRAVENOUS SYRINGE
12.5
Status: ACTIVE | OUTPATIENT
Start: 2024-08-07

## 2024-08-07 RX ORDER — ACETAMINOPHEN 650 MG/1
650 SUPPOSITORY RECTAL EVERY 4 HOURS PRN
Status: ACTIVE | OUTPATIENT
Start: 2024-08-07

## 2024-08-07 RX ORDER — ACETAMINOPHEN 325 MG/1
650 TABLET ORAL EVERY 4 HOURS PRN
Status: ACTIVE | OUTPATIENT
Start: 2024-08-07

## 2024-08-07 RX ORDER — LORAZEPAM 2 MG/ML
2 INJECTION INTRAMUSCULAR EVERY 2 HOUR PRN
Status: ACTIVE | OUTPATIENT
Start: 2024-08-07

## 2024-08-07 RX ORDER — TOLVAPTAN 15 MG/1
15 TABLET ORAL ONCE
Status: COMPLETED | OUTPATIENT
Start: 2024-08-07 | End: 2024-08-07

## 2024-08-07 RX ORDER — CEFTRIAXONE 1 G/50ML
1 INJECTION, SOLUTION INTRAVENOUS ONCE
Status: COMPLETED | OUTPATIENT
Start: 2024-08-07 | End: 2024-08-07

## 2024-08-07 RX ORDER — ASPIRIN 81 MG/1
81 TABLET ORAL DAILY
Status: ON HOLD | COMMUNITY

## 2024-08-07 RX ORDER — LORAZEPAM 2 MG/ML
0.5 INJECTION INTRAMUSCULAR EVERY 2 HOUR PRN
Status: ACTIVE | OUTPATIENT
Start: 2024-08-07

## 2024-08-07 RX ORDER — SODIUM CHLORIDE 1000 MG
1000 TABLET, SOLUBLE MISCELLANEOUS
Status: DISCONTINUED | OUTPATIENT
Start: 2024-08-08 | End: 2024-08-07

## 2024-08-07 RX ORDER — DEXTROSE 50 % IN WATER (D50W) INTRAVENOUS SYRINGE
25
Status: ACTIVE | OUTPATIENT
Start: 2024-08-07

## 2024-08-07 RX ORDER — CALCIUM GLUCONATE 20 MG/ML
2 INJECTION, SOLUTION INTRAVENOUS ONCE
Status: DISCONTINUED | OUTPATIENT
Start: 2024-08-07 | End: 2024-08-07

## 2024-08-07 RX ORDER — TRAZODONE HYDROCHLORIDE 50 MG/1
50 TABLET ORAL NIGHTLY
Status: ON HOLD | COMMUNITY

## 2024-08-07 RX ORDER — POLYETHYLENE GLYCOL 3350 17 G/17G
17 POWDER, FOR SOLUTION ORAL DAILY
Status: DISPENSED | OUTPATIENT
Start: 2024-08-07

## 2024-08-07 RX ORDER — ENOXAPARIN SODIUM 100 MG/ML
40 INJECTION SUBCUTANEOUS EVERY 24 HOURS
Status: DISPENSED | OUTPATIENT
Start: 2024-08-07

## 2024-08-07 RX ORDER — FOLIC ACID 1 MG/1
1 TABLET ORAL DAILY
Status: DISPENSED | OUTPATIENT
Start: 2024-08-07

## 2024-08-07 RX ORDER — MULTIVIT-MIN/IRON FUM/FOLIC AC 7.5 MG-4
1 TABLET ORAL DAILY
Status: DISPENSED | OUTPATIENT
Start: 2024-08-07

## 2024-08-07 RX ORDER — CEFTRIAXONE 1 G/50ML
1 INJECTION, SOLUTION INTRAVENOUS EVERY 24 HOURS
Status: DISCONTINUED | OUTPATIENT
Start: 2024-08-08 | End: 2024-08-11

## 2024-08-07 RX ORDER — THIAMINE HYDROCHLORIDE 100 MG/ML
100 INJECTION, SOLUTION INTRAMUSCULAR; INTRAVENOUS DAILY
Status: DISCONTINUED | OUTPATIENT
Start: 2024-08-07 | End: 2024-08-09

## 2024-08-07 RX ORDER — SODIUM CHLORIDE 9 MG/ML
125 INJECTION, SOLUTION INTRAVENOUS CONTINUOUS
Status: DISCONTINUED | OUTPATIENT
Start: 2024-08-07 | End: 2024-08-07

## 2024-08-07 RX ORDER — MELATONIN 3 MG
6 CAPSULE ORAL NIGHTLY
Status: ON HOLD | COMMUNITY

## 2024-08-07 RX ORDER — TALC
3 POWDER (GRAM) TOPICAL NIGHTLY PRN
Status: ACTIVE | OUTPATIENT
Start: 2024-08-07

## 2024-08-07 RX ORDER — LANOLIN ALCOHOL/MO/W.PET/CERES
400 CREAM (GRAM) TOPICAL ONCE
Status: COMPLETED | OUTPATIENT
Start: 2024-08-07 | End: 2024-08-07

## 2024-08-07 SDOH — SOCIAL STABILITY: SOCIAL INSECURITY: ARE THERE ANY APPARENT SIGNS OF INJURIES/BEHAVIORS THAT COULD BE RELATED TO ABUSE/NEGLECT?: NO

## 2024-08-07 SDOH — SOCIAL STABILITY: SOCIAL INSECURITY: WERE YOU ABLE TO COMPLETE ALL THE BEHAVIORAL HEALTH SCREENINGS?: YES

## 2024-08-07 SDOH — SOCIAL STABILITY: SOCIAL INSECURITY: DO YOU FEEL UNSAFE GOING BACK TO THE PLACE WHERE YOU ARE LIVING?: NO

## 2024-08-07 SDOH — SOCIAL STABILITY: SOCIAL INSECURITY: DOES ANYONE TRY TO KEEP YOU FROM HAVING/CONTACTING OTHER FRIENDS OR DOING THINGS OUTSIDE YOUR HOME?: NO

## 2024-08-07 SDOH — SOCIAL STABILITY: SOCIAL INSECURITY: HAVE YOU HAD THOUGHTS OF HARMING ANYONE ELSE?: NO

## 2024-08-07 SDOH — SOCIAL STABILITY: SOCIAL INSECURITY: HAVE YOU HAD ANY THOUGHTS OF HARMING ANYONE ELSE?: NO

## 2024-08-07 SDOH — SOCIAL STABILITY: SOCIAL INSECURITY: DO YOU FEEL ANYONE HAS EXPLOITED OR TAKEN ADVANTAGE OF YOU FINANCIALLY OR OF YOUR PERSONAL PROPERTY?: NO

## 2024-08-07 SDOH — SOCIAL STABILITY: SOCIAL INSECURITY: HAS ANYONE EVER THREATENED TO HURT YOUR FAMILY OR YOUR PETS?: NO

## 2024-08-07 SDOH — SOCIAL STABILITY: SOCIAL INSECURITY: ABUSE: ADULT

## 2024-08-07 SDOH — SOCIAL STABILITY: SOCIAL INSECURITY: ARE YOU OR HAVE YOU BEEN THREATENED OR ABUSED PHYSICALLY, EMOTIONALLY, OR SEXUALLY BY ANYONE?: NO

## 2024-08-07 ASSESSMENT — ENCOUNTER SYMPTOMS
DIARRHEA: 1
CONFUSION: 0
FREQUENCY: 1
EYE DISCHARGE: 0
ANAL BLEEDING: 0
CHILLS: 0
APPETITE CHANGE: 0
WHEEZING: 0
NECK PAIN: 0
COUGH: 1
SHORTNESS OF BREATH: 1
WOUND: 0
EYE PAIN: 0
ACTIVITY CHANGE: 0
SORE THROAT: 0
FEVER: 0
HEADACHES: 0
BLOOD IN STOOL: 0
HALLUCINATIONS: 0
NAUSEA: 0
POLYPHAGIA: 0
HEMATURIA: 0
WEAKNESS: 1
ABDOMINAL PAIN: 0
VOMITING: 0
BACK PAIN: 0
DYSURIA: 0
POLYDIPSIA: 0
PALPITATIONS: 0

## 2024-08-07 ASSESSMENT — LIFESTYLE VARIABLES
HOW OFTEN DO YOU HAVE 6 OR MORE DRINKS ON ONE OCCASION: WEEKLY
AGITATION: NORMAL ACTIVITY
SKIP TO QUESTIONS 9-10: 0
AUDITORY DISTURBANCES: NOT PRESENT
ANXIETY: NO ANXIETY, AT EASE
HEADACHE, FULLNESS IN HEAD: NOT PRESENT
TREMOR: NO TREMOR
NAUSEA AND VOMITING: NO NAUSEA AND NO VOMITING
ORIENTATION AND CLOUDING OF SENSORIUM: ORIENTED AND CAN DO SERIAL ADDITIONS
VISUAL DISTURBANCES: NOT PRESENT
PAROXYSMAL SWEATS: NO SWEAT VISIBLE
VISUAL DISTURBANCES: NOT PRESENT
TOTAL SCORE: 0
PAROXYSMAL SWEATS: NO SWEAT VISIBLE
AUDIT-C TOTAL SCORE: 8
HOW OFTEN DO YOU HAVE A DRINK CONTAINING ALCOHOL: 4 OR MORE TIMES A WEEK
AGITATION: NORMAL ACTIVITY
AUDIT-C TOTAL SCORE: 8
HEADACHE, FULLNESS IN HEAD: NOT PRESENT
TREMOR: NO TREMOR
AUDITORY DISTURBANCES: NOT PRESENT
HOW MANY STANDARD DRINKS CONTAINING ALCOHOL DO YOU HAVE ON A TYPICAL DAY: 3 OR 4
TOTAL SCORE: 0
ANXIETY: NO ANXIETY, AT EASE
ORIENTATION AND CLOUDING OF SENSORIUM: ORIENTED AND CAN DO SERIAL ADDITIONS
NAUSEA AND VOMITING: NO NAUSEA AND NO VOMITING

## 2024-08-07 ASSESSMENT — ACTIVITIES OF DAILY LIVING (ADL)
HEARING - LEFT EAR: DIFFICULTY WITH NOISE
WALKS IN HOME: NEEDS ASSISTANCE
TOILETING: NEEDS ASSISTANCE
LACK_OF_TRANSPORTATION: YES
PATIENT'S MEMORY ADEQUATE TO SAFELY COMPLETE DAILY ACTIVITIES?: YES
JUDGMENT_ADEQUATE_SAFELY_COMPLETE_DAILY_ACTIVITIES: YES
HEARING - RIGHT EAR: DIFFICULTY WITH NOISE
ASSISTIVE_DEVICE: WALKER;WHEELCHAIR
GROOMING: NEEDS ASSISTANCE
FEEDING YOURSELF: NEEDS ASSISTANCE
DRESSING YOURSELF: NEEDS ASSISTANCE
BATHING: NEEDS ASSISTANCE
ADEQUATE_TO_COMPLETE_ADL: YES

## 2024-08-07 ASSESSMENT — COGNITIVE AND FUNCTIONAL STATUS - GENERAL
PERSONAL GROOMING: A LITTLE
STANDING UP FROM CHAIR USING ARMS: A LITTLE
DAILY ACTIVITIY SCORE: 19
MOVING FROM LYING ON BACK TO SITTING ON SIDE OF FLAT BED WITH BEDRAILS: A LITTLE
DRESSING REGULAR UPPER BODY CLOTHING: A LITTLE
TURNING FROM BACK TO SIDE WHILE IN FLAT BAD: A LITTLE
MOVING TO AND FROM BED TO CHAIR: A LITTLE
HELP NEEDED FOR BATHING: A LITTLE
MOBILITY SCORE: 17
CLIMB 3 TO 5 STEPS WITH RAILING: A LOT
PATIENT BASELINE BEDBOUND: NO
WALKING IN HOSPITAL ROOM: A LITTLE
DRESSING REGULAR LOWER BODY CLOTHING: A LITTLE
TOILETING: A LITTLE

## 2024-08-07 ASSESSMENT — PAIN SCALES - GENERAL
PAINLEVEL_OUTOF10: 0 - NO PAIN

## 2024-08-07 ASSESSMENT — PATIENT HEALTH QUESTIONNAIRE - PHQ9
1. LITTLE INTEREST OR PLEASURE IN DOING THINGS: NEARLY EVERY DAY
SUM OF ALL RESPONSES TO PHQ9 QUESTIONS 1 & 2: 6
2. FEELING DOWN, DEPRESSED OR HOPELESS: NEARLY EVERY DAY

## 2024-08-07 ASSESSMENT — PAIN - FUNCTIONAL ASSESSMENT
PAIN_FUNCTIONAL_ASSESSMENT: 0-10
PAIN_FUNCTIONAL_ASSESSMENT: 0-10

## 2024-08-07 ASSESSMENT — COLUMBIA-SUICIDE SEVERITY RATING SCALE - C-SSRS
6. HAVE YOU EVER DONE ANYTHING, STARTED TO DO ANYTHING, OR PREPARED TO DO ANYTHING TO END YOUR LIFE?: NO
2. HAVE YOU ACTUALLY HAD ANY THOUGHTS OF KILLING YOURSELF?: NO
1. IN THE PAST MONTH, HAVE YOU WISHED YOU WERE DEAD OR WISHED YOU COULD GO TO SLEEP AND NOT WAKE UP?: NO
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
## 2024-08-08 ENCOUNTER — APPOINTMENT (OUTPATIENT)
Dept: CARDIOLOGY | Facility: CLINIC | Age: 74
End: 2024-08-08
Payer: MEDICARE

## 2024-08-08 LAB
ANION GAP SERPL CALC-SCNC: 13 MMOL/L (ref 10–20)
ANION GAP SERPL CALC-SCNC: 13 MMOL/L (ref 10–20)
ANION GAP SERPL CALC-SCNC: 14 MMOL/L (ref 10–20)
ANION GAP SERPL CALC-SCNC: 15 MMOL/L (ref 10–20)
ANION GAP SERPL CALC-SCNC: 17 MMOL/L (ref 10–20)
BUN SERPL-MCNC: 23 MG/DL (ref 6–23)
BUN SERPL-MCNC: 25 MG/DL (ref 6–23)
BUN SERPL-MCNC: 27 MG/DL (ref 6–23)
BUN SERPL-MCNC: 31 MG/DL (ref 6–23)
BUN SERPL-MCNC: 35 MG/DL (ref 6–23)
CALCIUM SERPL-MCNC: 8.9 MG/DL (ref 8.6–10.3)
CALCIUM SERPL-MCNC: 9.2 MG/DL (ref 8.6–10.3)
CHLORIDE SERPL-SCNC: 81 MMOL/L (ref 98–107)
CHLORIDE SERPL-SCNC: 83 MMOL/L (ref 98–107)
CHLORIDE SERPL-SCNC: 85 MMOL/L (ref 98–107)
CHLORIDE SERPL-SCNC: 86 MMOL/L (ref 98–107)
CHLORIDE SERPL-SCNC: 87 MMOL/L (ref 98–107)
CO2 SERPL-SCNC: 28 MMOL/L (ref 21–32)
CO2 SERPL-SCNC: 30 MMOL/L (ref 21–32)
CO2 SERPL-SCNC: 30 MMOL/L (ref 21–32)
CO2 SERPL-SCNC: 32 MMOL/L (ref 21–32)
CO2 SERPL-SCNC: 33 MMOL/L (ref 21–32)
CREAT SERPL-MCNC: 0.88 MG/DL (ref 0.5–1.3)
CREAT SERPL-MCNC: 0.91 MG/DL (ref 0.5–1.3)
CREAT SERPL-MCNC: 0.94 MG/DL (ref 0.5–1.3)
CREAT SERPL-MCNC: 1.07 MG/DL (ref 0.5–1.3)
CREAT SERPL-MCNC: 1.24 MG/DL (ref 0.5–1.3)
EGFRCR SERPLBLD CKD-EPI 2021: 60 ML/MIN/1.73M*2
EGFRCR SERPLBLD CKD-EPI 2021: 72 ML/MIN/1.73M*2
EGFRCR SERPLBLD CKD-EPI 2021: 84 ML/MIN/1.73M*2
EGFRCR SERPLBLD CKD-EPI 2021: 87 ML/MIN/1.73M*2
EGFRCR SERPLBLD CKD-EPI 2021: 89 ML/MIN/1.73M*2
ERYTHROCYTE [DISTWIDTH] IN BLOOD BY AUTOMATED COUNT: 15.9 % (ref 11.5–14.5)
GLUCOSE BLD MANUAL STRIP-MCNC: 134 MG/DL (ref 74–99)
GLUCOSE BLD MANUAL STRIP-MCNC: 145 MG/DL (ref 74–99)
GLUCOSE BLD MANUAL STRIP-MCNC: 197 MG/DL (ref 74–99)
GLUCOSE BLD MANUAL STRIP-MCNC: 239 MG/DL (ref 74–99)
GLUCOSE SERPL-MCNC: 145 MG/DL (ref 74–99)
GLUCOSE SERPL-MCNC: 176 MG/DL (ref 74–99)
GLUCOSE SERPL-MCNC: 186 MG/DL (ref 74–99)
GLUCOSE SERPL-MCNC: 202 MG/DL (ref 74–99)
GLUCOSE SERPL-MCNC: 202 MG/DL (ref 74–99)
HCT VFR BLD AUTO: 33.4 % (ref 41–52)
HGB BLD-MCNC: 11 G/DL (ref 13.5–17.5)
HOLD SPECIMEN: NORMAL
LEGIONELLA AG UR QL: NEGATIVE
MAGNESIUM SERPL-MCNC: 2.24 MG/DL (ref 1.6–2.4)
MCH RBC QN AUTO: 30 PG (ref 26–34)
MCHC RBC AUTO-ENTMCNC: 32.9 G/DL (ref 32–36)
MCV RBC AUTO: 91 FL (ref 80–100)
NRBC BLD-RTO: 0 /100 WBCS (ref 0–0)
OSMOLALITY SERPL: 250 MOSM/KG (ref 280–300)
OSMOLALITY UR: 198 MOSM/KG (ref 200–1200)
PHOSPHATE SERPL-MCNC: 4.9 MG/DL (ref 2.5–4.9)
PLATELET # BLD AUTO: 224 X10*3/UL (ref 150–450)
POTASSIUM SERPL-SCNC: 4.6 MMOL/L (ref 3.5–5.3)
POTASSIUM SERPL-SCNC: 4.6 MMOL/L (ref 3.5–5.3)
POTASSIUM SERPL-SCNC: 4.7 MMOL/L (ref 3.5–5.3)
PROCALCITONIN SERPL-MCNC: 0.06 NG/ML
RBC # BLD AUTO: 3.67 X10*6/UL (ref 4.5–5.9)
SODIUM SERPL-SCNC: 121 MMOL/L (ref 136–145)
SODIUM SERPL-SCNC: 122 MMOL/L (ref 136–145)
SODIUM SERPL-SCNC: 126 MMOL/L (ref 136–145)
SODIUM SERPL-SCNC: 126 MMOL/L (ref 136–145)
SODIUM SERPL-SCNC: 127 MMOL/L (ref 136–145)
WBC # BLD AUTO: 4.5 X10*3/UL (ref 4.4–11.3)

## 2024-08-08 PROCEDURE — 97112 NEUROMUSCULAR REEDUCATION: CPT | Mod: GO

## 2024-08-08 PROCEDURE — 2500000001 HC RX 250 WO HCPCS SELF ADMINISTERED DRUGS (ALT 637 FOR MEDICARE OP): Performed by: NURSE PRACTITIONER

## 2024-08-08 PROCEDURE — 85027 COMPLETE CBC AUTOMATED: CPT | Performed by: NURSE PRACTITIONER

## 2024-08-08 PROCEDURE — 2500000002 HC RX 250 W HCPCS SELF ADMINISTERED DRUGS (ALT 637 FOR MEDICARE OP, ALT 636 FOR OP/ED)

## 2024-08-08 PROCEDURE — 2500000005 HC RX 250 GENERAL PHARMACY W/O HCPCS: Performed by: NURSE PRACTITIONER

## 2024-08-08 PROCEDURE — 80048 BASIC METABOLIC PNL TOTAL CA: CPT | Performed by: NURSE PRACTITIONER

## 2024-08-08 PROCEDURE — 36415 COLL VENOUS BLD VENIPUNCTURE: CPT | Performed by: NURSE PRACTITIONER

## 2024-08-08 PROCEDURE — 2500000002 HC RX 250 W HCPCS SELF ADMINISTERED DRUGS (ALT 637 FOR MEDICARE OP, ALT 636 FOR OP/ED): Performed by: NURSE PRACTITIONER

## 2024-08-08 PROCEDURE — 2500000004 HC RX 250 GENERAL PHARMACY W/ HCPCS (ALT 636 FOR OP/ED): Performed by: INTERNAL MEDICINE

## 2024-08-08 PROCEDURE — 94640 AIRWAY INHALATION TREATMENT: CPT

## 2024-08-08 PROCEDURE — 97161 PT EVAL LOW COMPLEX 20 MIN: CPT | Mod: GP | Performed by: PHYSICAL THERAPIST

## 2024-08-08 PROCEDURE — S4991 NICOTINE PATCH NONLEGEND: HCPCS | Performed by: NURSE PRACTITIONER

## 2024-08-08 PROCEDURE — 2060000001 HC INTERMEDIATE ICU ROOM DAILY

## 2024-08-08 PROCEDURE — 99222 1ST HOSP IP/OBS MODERATE 55: CPT | Performed by: INTERNAL MEDICINE

## 2024-08-08 PROCEDURE — 83735 ASSAY OF MAGNESIUM: CPT | Performed by: NURSE PRACTITIONER

## 2024-08-08 PROCEDURE — 82947 ASSAY GLUCOSE BLOOD QUANT: CPT

## 2024-08-08 PROCEDURE — 84100 ASSAY OF PHOSPHORUS: CPT | Performed by: NURSE PRACTITIONER

## 2024-08-08 PROCEDURE — 97530 THERAPEUTIC ACTIVITIES: CPT | Mod: GP | Performed by: PHYSICAL THERAPIST

## 2024-08-08 PROCEDURE — 2500000001 HC RX 250 WO HCPCS SELF ADMINISTERED DRUGS (ALT 637 FOR MEDICARE OP)

## 2024-08-08 PROCEDURE — 97535 SELF CARE MNGMENT TRAINING: CPT | Mod: GO

## 2024-08-08 PROCEDURE — 97165 OT EVAL LOW COMPLEX 30 MIN: CPT | Mod: GO

## 2024-08-08 PROCEDURE — 2500000004 HC RX 250 GENERAL PHARMACY W/ HCPCS (ALT 636 FOR OP/ED): Performed by: NURSE PRACTITIONER

## 2024-08-08 RX ORDER — FLUTICASONE PROPIONATE 50 MCG
2 SPRAY, SUSPENSION (ML) NASAL DAILY
Status: DISPENSED | OUTPATIENT
Start: 2024-08-08

## 2024-08-08 RX ORDER — LAMOTRIGINE 100 MG/1
100 TABLET ORAL DAILY
Status: DISCONTINUED | OUTPATIENT
Start: 2024-08-08 | End: 2024-08-08

## 2024-08-08 RX ORDER — ISOSORBIDE MONONITRATE 30 MG/1
30 TABLET, EXTENDED RELEASE ORAL NIGHTLY
Status: DISPENSED | OUTPATIENT
Start: 2024-08-08

## 2024-08-08 RX ORDER — FLUTICASONE FUROATE AND VILANTEROL 200; 25 UG/1; UG/1
1 POWDER RESPIRATORY (INHALATION)
Status: DISCONTINUED | OUTPATIENT
Start: 2024-08-08 | End: 2024-08-08

## 2024-08-08 RX ORDER — ASPIRIN 81 MG/1
81 TABLET ORAL DAILY
Status: DISPENSED | OUTPATIENT
Start: 2024-08-08

## 2024-08-08 RX ORDER — IPRATROPIUM BROMIDE AND ALBUTEROL SULFATE 2.5; .5 MG/3ML; MG/3ML
3 SOLUTION RESPIRATORY (INHALATION)
Status: DISCONTINUED | OUTPATIENT
Start: 2024-08-08 | End: 2024-08-10

## 2024-08-08 RX ORDER — ALBUTEROL SULFATE 0.83 MG/ML
2.5 SOLUTION RESPIRATORY (INHALATION) EVERY 4 HOURS PRN
Status: DISPENSED | OUTPATIENT
Start: 2024-08-08

## 2024-08-08 RX ORDER — LAMOTRIGINE 100 MG/1
100 TABLET ORAL NIGHTLY
Status: DISPENSED | OUTPATIENT
Start: 2024-08-08

## 2024-08-08 RX ORDER — AMIODARONE HYDROCHLORIDE 200 MG/1
200 TABLET ORAL NIGHTLY
Status: DISPENSED | OUTPATIENT
Start: 2024-08-08

## 2024-08-08 RX ORDER — TRAZODONE HYDROCHLORIDE 50 MG/1
50 TABLET ORAL NIGHTLY
Status: DISPENSED | OUTPATIENT
Start: 2024-08-08

## 2024-08-08 RX ORDER — ISOSORBIDE MONONITRATE 30 MG/1
30 TABLET, EXTENDED RELEASE ORAL DAILY
Status: DISCONTINUED | OUTPATIENT
Start: 2024-08-08 | End: 2024-08-08

## 2024-08-08 RX ORDER — SPIRONOLACTONE 25 MG/1
12.5 TABLET ORAL 2 TIMES DAILY
Status: ACTIVE | OUTPATIENT
Start: 2024-08-08

## 2024-08-08 RX ORDER — ATORVASTATIN CALCIUM 20 MG/1
20 TABLET, FILM COATED ORAL DAILY
Status: DISPENSED | OUTPATIENT
Start: 2024-08-08

## 2024-08-08 RX ORDER — BUDESONIDE 0.5 MG/2ML
0.5 INHALANT ORAL
Status: DISPENSED | OUTPATIENT
Start: 2024-08-08

## 2024-08-08 RX ORDER — NICOTINE 7MG/24HR
1 PATCH, TRANSDERMAL 24 HOURS TRANSDERMAL EVERY 24 HOURS
Status: DISPENSED | OUTPATIENT
Start: 2024-08-08

## 2024-08-08 RX ORDER — AMIODARONE HYDROCHLORIDE 200 MG/1
200 TABLET ORAL DAILY
Status: DISCONTINUED | OUTPATIENT
Start: 2024-08-08 | End: 2024-08-08

## 2024-08-08 RX ORDER — METOPROLOL SUCCINATE 25 MG/1
25 TABLET, EXTENDED RELEASE ORAL DAILY
Status: DISPENSED | OUTPATIENT
Start: 2024-08-08

## 2024-08-08 RX ORDER — FORMOTEROL FUMARATE DIHYDRATE 20 UG/2ML
20 SOLUTION RESPIRATORY (INHALATION)
Status: DISPENSED | OUTPATIENT
Start: 2024-08-08

## 2024-08-08 SDOH — ECONOMIC STABILITY: FOOD INSECURITY: WITHIN THE PAST 12 MONTHS, THE FOOD YOU BOUGHT JUST DIDN'T LAST AND YOU DIDN'T HAVE MONEY TO GET MORE.: SOMETIMES TRUE

## 2024-08-08 SDOH — ECONOMIC STABILITY: FOOD INSECURITY: WITHIN THE PAST 12 MONTHS, YOU WORRIED THAT YOUR FOOD WOULD RUN OUT BEFORE YOU GOT MONEY TO BUY MORE.: SOMETIMES TRUE

## 2024-08-08 SDOH — ECONOMIC STABILITY: HOUSING INSECURITY: AT ANY TIME IN THE PAST 12 MONTHS, WERE YOU HOMELESS OR LIVING IN A SHELTER (INCLUDING NOW)?: NO

## 2024-08-08 SDOH — ECONOMIC STABILITY: INCOME INSECURITY: IN THE LAST 12 MONTHS, WAS THERE A TIME WHEN YOU WERE NOT ABLE TO PAY THE MORTGAGE OR RENT ON TIME?: NO

## 2024-08-08 SDOH — ECONOMIC STABILITY: TRANSPORTATION INSECURITY
IN THE PAST 12 MONTHS, HAS THE LACK OF TRANSPORTATION KEPT YOU FROM MEDICAL APPOINTMENTS OR FROM GETTING MEDICATIONS?: YES

## 2024-08-08 SDOH — ECONOMIC STABILITY: INCOME INSECURITY: HOW HARD IS IT FOR YOU TO PAY FOR THE VERY BASICS LIKE FOOD, HOUSING, MEDICAL CARE, AND HEATING?: SOMEWHAT HARD

## 2024-08-08 SDOH — ECONOMIC STABILITY: INCOME INSECURITY: IN THE PAST 12 MONTHS, HAS THE ELECTRIC, GAS, OIL, OR WATER COMPANY THREATENED TO SHUT OFF SERVICE IN YOUR HOME?: NO

## 2024-08-08 SDOH — ECONOMIC STABILITY: HOUSING INSECURITY: IN THE PAST 12 MONTHS, HOW MANY TIMES HAVE YOU MOVED WHERE YOU WERE LIVING?: 0

## 2024-08-08 SDOH — ECONOMIC STABILITY: TRANSPORTATION INSECURITY
IN THE PAST 12 MONTHS, HAS LACK OF TRANSPORTATION KEPT YOU FROM MEETINGS, WORK, OR FROM GETTING THINGS NEEDED FOR DAILY LIVING?: YES

## 2024-08-08 ASSESSMENT — LIFESTYLE VARIABLES
PAROXYSMAL SWEATS: NO SWEAT VISIBLE
HEADACHE, FULLNESS IN HEAD: NOT PRESENT
PAROXYSMAL SWEATS: NO SWEAT VISIBLE
AGITATION: NORMAL ACTIVITY
AUDITORY DISTURBANCES: NOT PRESENT
AGITATION: NORMAL ACTIVITY
ORIENTATION AND CLOUDING OF SENSORIUM: ORIENTED AND CAN DO SERIAL ADDITIONS
VISUAL DISTURBANCES: NOT PRESENT
ANXIETY: NO ANXIETY, AT EASE
HEADACHE, FULLNESS IN HEAD: NOT PRESENT
AUDITORY DISTURBANCES: NOT PRESENT
NAUSEA AND VOMITING: NO NAUSEA AND NO VOMITING
ANXIETY: NO ANXIETY, AT EASE
AGITATION: NORMAL ACTIVITY
ANXIETY: NO ANXIETY, AT EASE
TOTAL SCORE: 0
ORIENTATION AND CLOUDING OF SENSORIUM: ORIENTED AND CAN DO SERIAL ADDITIONS
TREMOR: NO TREMOR
PAROXYSMAL SWEATS: NO SWEAT VISIBLE
ORIENTATION AND CLOUDING OF SENSORIUM: ORIENTED AND CAN DO SERIAL ADDITIONS
NAUSEA AND VOMITING: NO NAUSEA AND NO VOMITING
TREMOR: NO TREMOR
TOTAL SCORE: 0
ORIENTATION AND CLOUDING OF SENSORIUM: ORIENTED AND CAN DO SERIAL ADDITIONS
AUDITORY DISTURBANCES: NOT PRESENT
TOTAL SCORE: 0
NAUSEA AND VOMITING: NO NAUSEA AND NO VOMITING
HEADACHE, FULLNESS IN HEAD: NOT PRESENT
AUDITORY DISTURBANCES: NOT PRESENT
NAUSEA AND VOMITING: NO NAUSEA AND NO VOMITING
PAROXYSMAL SWEATS: NO SWEAT VISIBLE
TREMOR: NO TREMOR
TOTAL SCORE: 0
TOTAL SCORE: 0
TREMOR: NO TREMOR
ANXIETY: NO ANXIETY, AT EASE
ANXIETY: NO ANXIETY, AT EASE
ORIENTATION AND CLOUDING OF SENSORIUM: ORIENTED AND CAN DO SERIAL ADDITIONS
ORIENTATION AND CLOUDING OF SENSORIUM: ORIENTED AND CAN DO SERIAL ADDITIONS
NAUSEA AND VOMITING: NO NAUSEA AND NO VOMITING
PULSE: 82
BLOOD PRESSURE: 116/58
VISUAL DISTURBANCES: NOT PRESENT
TREMOR: NO TREMOR
VISUAL DISTURBANCES: NOT PRESENT
TOTAL SCORE: 0
AGITATION: NORMAL ACTIVITY
AUDITORY DISTURBANCES: NOT PRESENT
VISUAL DISTURBANCES: NOT PRESENT
PULSE: 80
PULSE: 86
AGITATION: NORMAL ACTIVITY
VISUAL DISTURBANCES: NOT PRESENT
ANXIETY: NO ANXIETY, AT EASE
VISUAL DISTURBANCES: NOT PRESENT
PULSE: 82
HEADACHE, FULLNESS IN HEAD: NOT PRESENT
VISUAL DISTURBANCES: NOT PRESENT
TOTAL SCORE: 0
BLOOD PRESSURE: 107/58
NAUSEA AND VOMITING: NO NAUSEA AND NO VOMITING
TREMOR: NO TREMOR
PAROXYSMAL SWEATS: NO SWEAT VISIBLE
AGITATION: NORMAL ACTIVITY
NAUSEA AND VOMITING: NO NAUSEA AND NO VOMITING
PAROXYSMAL SWEATS: NO SWEAT VISIBLE
ANXIETY: NO ANXIETY, AT EASE
HEADACHE, FULLNESS IN HEAD: NOT PRESENT
TOTAL SCORE: 0
NAUSEA AND VOMITING: NO NAUSEA AND NO VOMITING
TREMOR: NO TREMOR
PAROXYSMAL SWEATS: NO SWEAT VISIBLE
ORIENTATION AND CLOUDING OF SENSORIUM: ORIENTED AND CAN DO SERIAL ADDITIONS
TREMOR: NO TREMOR
HEADACHE, FULLNESS IN HEAD: NOT PRESENT
VISUAL DISTURBANCES: NOT PRESENT
HEADACHE, FULLNESS IN HEAD: NOT PRESENT
HEADACHE, FULLNESS IN HEAD: NOT PRESENT
AUDITORY DISTURBANCES: NOT PRESENT
PAROXYSMAL SWEATS: NO SWEAT VISIBLE
BLOOD PRESSURE: 103/55
AUDITORY DISTURBANCES: NOT PRESENT
BLOOD PRESSURE: 96/55
PAROXYSMAL SWEATS: NO SWEAT VISIBLE
AUDITORY DISTURBANCES: NOT PRESENT
TREMOR: NO TREMOR
ORIENTATION AND CLOUDING OF SENSORIUM: ORIENTED AND CAN DO SERIAL ADDITIONS
HEADACHE, FULLNESS IN HEAD: NOT PRESENT
AGITATION: NORMAL ACTIVITY
AGITATION: NORMAL ACTIVITY
ANXIETY: NO ANXIETY, AT EASE
ORIENTATION AND CLOUDING OF SENSORIUM: ORIENTED AND CAN DO SERIAL ADDITIONS
NAUSEA AND VOMITING: NO NAUSEA AND NO VOMITING
ANXIETY: NO ANXIETY, AT EASE
AGITATION: NORMAL ACTIVITY
VISUAL DISTURBANCES: NOT PRESENT
AUDITORY DISTURBANCES: NOT PRESENT
TOTAL SCORE: 0

## 2024-08-08 ASSESSMENT — ENCOUNTER SYMPTOMS
PALPITATIONS: 0
STRIDOR: 1
LIGHT-HEADEDNESS: 1
ACTIVITY CHANGE: 1
ARTHRALGIAS: 1
WEAKNESS: 1
BRUISES/BLEEDS EASILY: 1
DIFFICULTY URINATING: 1
NAUSEA: 0
FREQUENCY: 1
EYES NEGATIVE: 1
NERVOUS/ANXIOUS: 1
WHEEZING: 0
COUGH: 1
SINUS PRESSURE: 0
SLEEP DISTURBANCE: 1
SHORTNESS OF BREATH: 1
CONSTIPATION: 1
ABDOMINAL DISTENTION: 1
FATIGUE: 1
BACK PAIN: 1
DYSURIA: 0
VOMITING: 0

## 2024-08-08 ASSESSMENT — COGNITIVE AND FUNCTIONAL STATUS - GENERAL
DRESSING REGULAR LOWER BODY CLOTHING: A LOT
WALKING IN HOSPITAL ROOM: A LITTLE
PERSONAL GROOMING: A LITTLE
TOILETING: A LITTLE
MOVING FROM LYING ON BACK TO SITTING ON SIDE OF FLAT BED WITH BEDRAILS: A LITTLE
PERSONAL GROOMING: A LITTLE
MOVING FROM LYING ON BACK TO SITTING ON SIDE OF FLAT BED WITH BEDRAILS: A LITTLE
MOBILITY SCORE: 16
WALKING IN HOSPITAL ROOM: A LOT
TOILETING: A LOT
HELP NEEDED FOR BATHING: A LITTLE
CLIMB 3 TO 5 STEPS WITH RAILING: A LOT
MOVING TO AND FROM BED TO CHAIR: A LITTLE
MOBILITY SCORE: 17
DAILY ACTIVITIY SCORE: 16
CLIMB 3 TO 5 STEPS WITH RAILING: A LOT
STANDING UP FROM CHAIR USING ARMS: A LITTLE
TURNING FROM BACK TO SIDE WHILE IN FLAT BAD: A LITTLE
DAILY ACTIVITIY SCORE: 19
HELP NEEDED FOR BATHING: A LOT
DRESSING REGULAR UPPER BODY CLOTHING: A LITTLE
DRESSING REGULAR UPPER BODY CLOTHING: A LITTLE
MOVING TO AND FROM BED TO CHAIR: A LITTLE
DRESSING REGULAR LOWER BODY CLOTHING: A LITTLE
STANDING UP FROM CHAIR USING ARMS: A LITTLE
TURNING FROM BACK TO SIDE WHILE IN FLAT BAD: A LITTLE

## 2024-08-08 ASSESSMENT — PAIN SCALES - GENERAL
PAINLEVEL_OUTOF10: 0 - NO PAIN

## 2024-08-08 ASSESSMENT — PAIN - FUNCTIONAL ASSESSMENT
PAIN_FUNCTIONAL_ASSESSMENT: 0-10

## 2024-08-08 ASSESSMENT — ACTIVITIES OF DAILY LIVING (ADL)
BATHING_ASSISTANCE: MODERATE
HOME_MANAGEMENT_TIME_ENTRY: 10
ADL_ASSISTANCE: INDEPENDENT
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
## 2024-08-08 NOTE — NURSING NOTE
"Patient took all of his morning meds from his own weekly AM/PM pill minder. Took his pill minder to pharmacy with his name sticker on it and asked them to identify what he took. He said he took all of them \"except the orange one, the diuretic\". Verified pills as bumex, baby ASA, half of a Jardience, toprol XL, half of a spironolactone, atorvastatin and unidentified round white  pill with no markings and another half of a yellow pill.   His friend/POA sets up his meds for him and helped to identify that the round white one was a magnesium but unsure of the other. She has to go clean out his refrigerator so she will come back with his pill bottles.   He did not take the bumex today and we have no proof if he took any amiodarone or lamictal.  All above information shared with NP via secure chat  "

## 2024-08-08 NOTE — PROGRESS NOTES
"   08/08/24 1205   Discharge Planning   Living Arrangements Alone   Support Systems Friends/neighbors   Assistance Needed uses walker/rollator/mobility scooter states having trouble doing things for self due to SOB   Type of Residence Other (Comment)  (Extended stay hotel)   Do you have animals or pets at home? No   Who is requesting discharge planning? Provider   Home or Post Acute Services Post acute facilities (Rehab/SNF/etc)   Type of Post Acute Facility Services Skilled nursing   Expected Discharge Disposition SNF   Does the patient need discharge transport arranged? Yes   Has discharge transport been arranged? No   Patient Choice   Patient / Family choosing to utilize agency / facility established prior to hospitalization No     Met with patient at bedside. Verified address and P[facility]. Patient gets all of his medications through the [facility] and are mailed to him. Uses a walker/rollator to ambulate. Patient also has a mobility scooter to get around outside the hotel. Patient states that it is getting hard to complete his ADL's due to weakness ans SOB. States hasn't been able to shower for weeks because having trouble getting in to tub. Patient [facility]. Therapy is recommending SNF and when talking with patient, he is agreeable to going but not for a \"long time\". Wanted to get patient a choice list but stated that those are useless and stated that would like to go to [facility]. Will request [facility] to send referrals to both [facility] and [facility]. CT team to follow patient for discharge needs.   "

## 2024-08-08 NOTE — CARE PLAN
Brief nephrology note:    Na level up to 122 mmol/l   To hold sodium chloride tabs   To cont lasix drip     Will follow,  Thank you!

## 2024-08-08 NOTE — CARE PLAN
Problem: Skin  Goal: Prevent/manage excess moisture  Flowsheets (Taken 8/8/2024 0488)  Prevent/manage excess moisture: Monitor for/manage infection if present   The patient's goals for the shift include      The clinical goals for the shift include Pt oxygen saturation will remain above 92% throughout shift    Over the shift, the patient did not make progress toward the following goals. Barriers to progression include . Recommendations to address these barriers include .

## 2024-08-08 NOTE — CARE PLAN
SHIFT NOTE    Diagnosis:  Acute on chronic hypoxic respiratory failure     Assessment:    Patient remains hds. He remains on CPAP/10 L NC oximizer and sating well. He does get dyspneic on exertion at times. Patient was alert and oriented x4 for most of the shift with some forgetfulness. No complaints of pain. Sodium is slowly trending upwards and is now 122. He remains on a lasix drip @ 5 mg/hr and blood pressure remains stable. He's diuresed about 1400 ml overnight.     Hourly rounding was performed and patient needs were met. Call light within reach. No other acute events, will continue to monitor.       The clinical goals for the shift include Patient will maintain POX >92% by end of shift 8/8/24 at 0700.
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
## 2024-08-08 NOTE — CONSULTS
"Inpatient consult to Cardiology  Consult performed by: Denisse Payne MD  Consult ordered by: Renato Mora MD  Reason for consult: CHF, acute on chronic, a fib.          Cardiology Consult Note      Date:   8/8/2024  Patient name:  Milton Lane  Date of admission:  8/7/2024 12:48 PM  MRN:   [id number]  YOB: 1950  Time of Consult:  2:02 PM    Consulting Cardiologist: Dr. Denisse Payne      Primary Cardiologist:   None - followed with Dr. Mora until earlier this year     Referring Provider: Dr. Rishi Anthony      Admission Diagnosis:     Acute on chronic hypoxic respiratory failure (Multi)    History of Present Illness:      Milton Lane is a 74 y.o.  male patient who is being at the request of Dr. Anthony for inpatient consultation of CHF. He was admitted on 8/7/2024.  Previous NOH and EHR records have been reviewed in detail.      Patient seen with his \"friend\" at the bedside. Came to ER with worsening SOB yesterday after losing power and his oxygen concentrator at an extended stay hotel locally. Has no other home to stay at. Receives care/gets medications from the [facility].     Was diuresed mildly yesterday. Friend has brought him low sodium potato chips and breakfast bars. He was trying to have more foods at \"home\" that did not require cooking. .    Denies chest pain or discomfort. Has SOB worse than his baseline but no worsening LE edema. No palpitations, syncope or near syncope. LE edema is at his new baseline and less than what it was prior to his admission here in June. Has been taking his medications.     Has cough productive of cream colored sputum. 3 pillow orthopnea. No palpitations. Has occasional dizziness with standing.     PMH  Chronic systolic and diastolic heart failure - EF 35% but TDS echo in June 54%. Grade II diastolic dysfunction. Hx of intolerance to ACE/ARB  COPD on chronic oxygen therapy  CAD - details unknown  Atrial fibrillation - not on " anticoagulant therapy  HLD  Hyponatremia  Mild aortic stenosis with mild/moderate regurgitation.   Chronic hypoxic respiratory failure - on chronic oxygen therapy.     Allergies:     Allergies   Allergen Reactions    Flu Vaccine 2011-12(3 Yr+)(Pf) Unknown    Losartan Unknown       Past Medical History:     Past Medical History:   Diagnosis Date    Bipolar 1 disorder (Multi)     Chronic systolic heart failure (Multi) 12/15/2023    COPD (chronic obstructive pulmonary disease) (Multi)     Coronary artery disease involving native coronary artery of native heart without angina pectoris 12/15/2023    Hypertension     Iron deficiency anemia     Nicotine dependence, uncomplicated 12/15/2023    Nonrheumatic aortic valve stenosis 12/15/2023    Obese     PAF (paroxysmal atrial fibrillation) (Multi)     PTSD (post-traumatic stress disorder)        Past Surgical History:     No past surgical history on file.    Family History:     No family history on file.    Social History:     Social History     Socioeconomic History    Marital status: Single   Tobacco Use    Smoking status: Every Day     Types: Cigarettes    Smokeless tobacco: Never    Tobacco comments:     Smokes 6 cigarettes per day.   Substance and Sexual Activity    Alcohol use: Yes     Comment: 2 cases of beer per week.    Drug use: Never    Sexual activity: Defer     Social Determinants of Health     Financial Resource Strain: Medium Risk (8/8/2024)    Overall Financial Resource Strain (CARDIA)     Difficulty of Paying Living Expenses: Somewhat hard   Food Insecurity: Food Insecurity Present (8/8/2024)    Hunger Vital Sign     Worried About Running Out of Food in the Last Year: Sometimes true     Ran Out of Food in the Last Year: Sometimes true   Transportation Needs: Unmet Transportation Needs (8/8/2024)    PRAPARE - Transportation     Lack of Transportation (Medical): Yes     Lack of Transportation (Non-Medical): Yes   Housing Stability: Low Risk  (8/8/2024)     Housing Stability Vital Sign     Unable to Pay for Housing in the Last Year: No     Number of Times Moved in the Last Year: 0     Homeless in the Last Year: No       Home Medications:     Prior to Admission medications    Medication Sig Start Date End Date Taking? Authorizing Provider   albuterol (Ventolin HFA) 90 mcg/actuation inhaler Inhale 2 puffs every 6 hours if needed for shortness of breath or wheezing.   Yes Historical Provider, MD   albuterol 2.5 mg /3 mL (0.083 %) nebulizer solution Take 3 mL (2.5 mg) by nebulization every 4 hours if needed for shortness of breath or wheezing.   Yes Historical Provider, MD   amiodarone (Pacerone) 200 mg tablet Take 1 tablet (200 mg) by mouth once daily. 6/20/24 8/7/24 Yes Fernando Kahn MD   aspirin 81 mg EC tablet Take 1 tablet (81 mg) by mouth once daily.   Yes Historical Provider, MD   atorvastatin (Lipitor) 20 mg tablet Take 1 tablet (20 mg) by mouth once daily. 6/19/24 8/7/24 Yes Fernando Kahn MD   bumetanide (Bumex) 1 mg tablet Take 3 tablets (3 mg) by mouth 2 times daily (morning and late afternoon). 6/19/24 8/7/24 Yes Fernando Kahn MD   empagliflozin (Jardiance) 25 mg Take 0.5 tablets (12.5 mg) by mouth once daily.   Yes Historical Provider, MD   fluticasone (Flonase) 50 mcg/actuation nasal spray Administer 2 sprays into each nostril once daily. Shake gently. Before first use, prime pump. After use, clean tip and replace cap. 6/19/24 8/7/24 Yes Fernando Kahn MD   fluticasone propion-salmeteroL (Advair Diskus) 250-50 mcg/dose diskus inhaler Inhale 1 puff 2 times a day. Rinse mouth with water after use to reduce aftertaste and incidence of candidiasis. Do not swallow.   Yes Historical Provider, MD   isosorbide mononitrate ER (Imdur) 30 mg 24 hr tablet Take 1 tablet (30 mg) by mouth once daily. Do not crush or chew. 6/19/24 8/7/24 Yes Fernando Kahn MD   lamoTRIgine (LaMICtal) 100 mg tablet Take 1 tablet (100 mg) by mouth once daily.   Yes Historical  Provider, MD   magnesium oxide (Mag-Ox) 400 mg (241.3 mg magnesium) tablet Take 1 tablet (400 mg) by mouth once daily. 6/19/24  Yes Fernando Kahn MD   melatonin 3 mg capsule Take 6 mg by mouth once daily at bedtime.   Yes Historical Provider, MD   menthol (Biofreeze, menthol,) 10.5 % aerosol,spray Apply 1 spray topically 2 times a day as needed (for pain).   Yes Historical Provider, MD   metoprolol succinate XL (Toprol-XL) 25 mg 24 hr tablet Take 1 tablet (25 mg) by mouth once daily. Do not crush or chew. 6/19/24 8/7/24 Yes Fernando Kahn MD   nicotine (Nicoderm CQ) 7 mg/24 hr patch Place 1 patch on the skin once every 24 hours.   Yes Historical Provider, MD   nicotine polacrilex (Commit) 2 mg lozenge Use 1 lozenge (2 mg) in the mouth or throat if needed for smoking cessation.   Yes Historical Provider, MD   oxygen (O2) gas therapy Inhale 1 each continuously. 6/19/24  Yes Fernando Kahn MD   spironolactone (Aldactone) 25 mg tablet Take 0.5 tablets (12.5 mg) by mouth 2 times a day. 6/19/24 8/7/24 Yes Fernando Kahn MD   traZODone (Desyrel) 50 mg tablet Take 1 tablet (50 mg) by mouth once daily at bedtime.   Yes Historical Provider, MD   atorvastatin (Lipitor) 20 mg tablet Take 1 tablet (20 mg) by mouth once daily.  8/7/24 Yes Historical Provider, MD   isosorbide mononitrate ER (Imdur) 30 mg 24 hr tablet Take 0.5 tablets (15 mg) by mouth once daily. Do not crush or chew.  8/7/24 Yes Historical Provider, MD   metoprolol succinate XL (Toprol-XL) 25 mg 24 hr tablet Take 1 tablet (25 mg) by mouth once daily.  8/7/24 Yes Historical Provider, MD       Current Medications:     Current Outpatient Medications   Medication Instructions    albuterol (Ventolin HFA) 90 mcg/actuation inhaler 2 puffs, inhalation, Every 6 hours PRN    albuterol 2.5 mg, nebulization, Every 4 hours PRN    amiodarone (PACERONE) 200 mg, oral, Daily    aspirin 81 mg, oral, Daily    atorvastatin (LIPITOR) 20 mg, oral, Daily    bumetanide  (BUMEX) 3 mg, oral, 2 times daily (morning and late afternoon)    empagliflozin (JARDIANCE) 12.5 mg, oral, Daily    fluticasone (Flonase) 50 mcg/actuation nasal spray 2 sprays, Each Nostril, Daily, Shake gently. Before first use, prime pump. After use, clean tip and replace cap.    fluticasone propion-salmeteroL (Advair Diskus) 250-50 mcg/dose diskus inhaler 1 puff, inhalation, 2 times daily RT, Rinse mouth with water after use to reduce aftertaste and incidence of candidiasis. Do not swallow.    isosorbide mononitrate ER (IMDUR) 30 mg, oral, Daily, Do not crush or chew.    lamoTRIgine (LAMICTAL) 100 mg, oral, Daily    magnesium oxide (MAG-OX) 400 mg, oral, Daily    melatonin 6 mg, oral, Nightly    menthol (Biofreeze, menthol,) 10.5 % aerosol,spray 1 spray, topical (top), 2 times daily PRN    metoprolol succinate XL (TOPROL-XL) 25 mg, oral, Daily, Do not crush or chew.    nicotine (Nicoderm CQ) 7 mg/24 hr patch 1 patch, transdermal, Every 24 hours    nicotine polacrilex (COMMIT) 2 mg, Mouth/Throat, As needed    oxygen (O2) gas therapy 1 each, inhalation, Continuous    spironolactone (ALDACTONE) 12.5 mg, oral, 2 times daily    traZODone (DESYREL) 50 mg, oral, Nightly        IV Medications:     furosemide, 5 mg/hr, Last Rate: 5 mg/hr (08/08/24 1200)        Review of Systems:      Review of Systems   Constitutional:  Positive for activity change and fatigue.   HENT:  Positive for congestion, dental problem and hearing loss. Negative for sinus pressure.    Eyes: Negative.    Respiratory:  Positive for cough, shortness of breath and stridor. Negative for wheezing.    Cardiovascular:  Positive for leg swelling. Negative for chest pain and palpitations.   Gastrointestinal:  Positive for abdominal distention and constipation. Negative for nausea and vomiting.   Genitourinary:  Positive for difficulty urinating and frequency. Negative for dysuria and testicular pain.   Musculoskeletal:  Positive for arthralgias, back pain  and gait problem.   Skin: Negative.    Neurological:  Positive for weakness and light-headedness.   Hematological:  Bruises/bleeds easily.   Psychiatric/Behavioral:  Positive for sleep disturbance. The patient is nervous/anxious.    All other systems reviewed and are negative.      Vital Signs:     Vitals:    08/08/24 0800 08/08/24 1000 08/08/24 1012 08/08/24 1200   BP: 116/58 103/55 103/55 120/59   BP Location: Left arm Left arm Left arm Left arm   Patient Position: Lying Lying Lying Lying   Pulse: 82 82 94 84   Resp: 18 18  18   Temp: 36.5 °C (97.7 °F)   36.4 °C (97.5 °F)   TempSrc: Temporal   Temporal   SpO2: 94% 98% 98% 98%   Weight:       Height:           Intake/Output Summary (Last 24 hours) at 8/8/2024 1402  Last data filed at 8/8/2024 1200  Gross per 24 hour   Intake 524.6 ml   Output 2550 ml   Net -2025.4 ml     Vitals:    08/08/24 0622   Weight: 93.3 kg (205 lb 11 oz)       Physical Examination:     GENERAL APPEARANCE: Well developed, well nourished, in no acute distress.  HEENT: No gross abnormalities of conjunctiva, teeth, gums, oral mucosa  NECK: reduced RP, mild JVD, no bruit. Thyroid not palpable. Carotid upstrokes normal.  CHEST: Symmetric and non-tender.  LUNGS: Moderate tachypnea. Diminished air entry with bilateral rales at bases R>L, scattered rhonchi present, prolonged exp phase, no wheeze.   HEART: PMI laterally displaced, RR, occ irreg, normal S1 and S2, no S3. Alexy/dec murmur at RUSB, no diastolic murmur appreciated. Mild bilateral LE edema with chronic skin changes, + HJR.   ABDOMEN: Soft, nontender, no palpable hepatosplenomegaly, no mases, no bruits. Abdominal aorta not noted to be enlarged.  MUSCULOSKELETAL: Arthritic changes  EXTREMITIES: Warm with good color, cyanois. Mild clubbing. There is mild edema noted.  PERIPHERAL VASCULAR: Pulses present and equally palpable.  NEURO/PSHCY: Alert and oriented x3; appropriate behavior and responses and responses, no focal motor deficits -  "globally weak.   INTEGUMENT: Skin warm and dry, without gross excoriationis or lesions. Chronic distal skin changes bilateral LE.   LYMPH: No significant palpable lymphadenopathy      Lab:     CBC:   Lab Results   Component Value Date    WBC 4.5 08/08/2024    RBC 3.67 (L) 08/08/2024    HGB 11.0 (L) 08/08/2024    HCT 33.4 (L) 08/08/2024     08/08/2024        CMP:    Lab Results   Component Value Date     (L) 08/08/2024    K 4.7 08/08/2024    CL 85 (L) 08/08/2024    CO2 33 (H) 08/08/2024    BUN 27 (H) 08/08/2024    CREATININE 0.94 08/08/2024    GLUCOSE 186 (H) 08/08/2024    CALCIUM 8.9 08/08/2024       Magnesium:    Lab Results   Component Value Date    MG 2.24 08/08/2024       Lipid Profile:    No results found for: \"CHLPL\", \"TRIG\", \"HDL\", \"LDLCALC\", \"LDLDIRECT\"    TSH:    No results found for: \"TSH\"    BNP:   Lab Results   Component Value Date    BNP 2,457 (H) 08/07/2024        PT/INR:    Lab Results   Component Value Date    PROTIME 14.2 (H) 08/07/2024    INR 1.3 (H) 08/07/2024       HgBA1c:    Lab Results   Component Value Date    HGBA1C 5.5 01/08/2023       BMP:  Lab Results   Component Value Date     (L) 08/08/2024     (L) 08/08/2024     (L) 08/07/2024    K 4.7 08/08/2024    K 4.6 08/08/2024    K 4.6 08/07/2024    CL 85 (L) 08/08/2024    CL 83 (L) 08/08/2024    CL 81 (L) 08/07/2024    CO2 33 (H) 08/08/2024    CO2 30 08/08/2024    CO2 30 08/07/2024    BUN 27 (H) 08/08/2024    BUN 25 (H) 08/08/2024    BUN 23 08/07/2024    CREATININE 0.94 08/08/2024    CREATININE 0.88 08/08/2024    CREATININE 0.91 08/07/2024       CBC:  Lab Results   Component Value Date    WBC 4.5 08/08/2024    WBC 7.4 08/07/2024    RBC 3.67 (L) 08/08/2024    RBC 4.00 (L) 08/07/2024    HGB 11.0 (L) 08/08/2024    HGB 12.0 (L) 08/07/2024    HCT 33.4 (L) 08/08/2024    HCT 35.2 (L) 08/07/2024    MCV 91 08/08/2024    MCV 88 08/07/2024    MCH 30.0 08/08/2024    MCH 30.0 08/07/2024    MCHC 32.9 08/08/2024    MCHC 34.1 " 08/07/2024    RDW 15.9 (H) 08/08/2024    RDW 15.7 (H) 08/07/2024     08/08/2024     08/07/2024       Cardiac Enzymes:    Lab Results   Component Value Date    TROPHS 132 (HH) 08/07/2024    TROPHS 86 (HH) 08/07/2024    TROPHS 72 (HH) 08/07/2024       Hepatic Function Panel:    Lab Results   Component Value Date    ALKPHOS 87 08/07/2024    ALT 12 08/07/2024    AST 23 08/07/2024    PROT 7.8 08/07/2024    BILITOT 1.3 (H) 08/07/2024         Diagnostic Studies:     XR chest 1 view    Result Date: 8/7/2024  Interpreted By:  Dom Beth, STUDY: XR CHEST 1 VIEW; 8/7/2024 6:06 pm   INDICATION: Signs/Symptoms:SOB.   COMPARISON: 08/07/2024   ACCESSION NUMBER(S): [id number]   ORDERING CLINICIAN: KERI WHITMAN   TECHNIQUE: 1 view of the chest was performed.   FINDINGS: No significant interval change. Small bibasilar airspace opacities which could reflect small effusions and atelectatic change. Congestive changes and mild prominence of the pulmonary vasculature and interstitium suggest mild pulmonary edema.. No pneumothorax.  The cardiomediastinal silhouette is within normal limits.       No significant interval change suggestion of small bilateral pleural effusions, congestive changes, and probable mild pulmonary edema.   Signed by: Dom Beth 8/7/2024 6:21 PM Dictation workstation:   [id number]    ECG 12 lead    Result Date: 8/7/2024  uncertain rhythm, AF versus SR with PACs artifact affecting tracing Rightward axis Prolonged QT When compared with ECG of 18-JUN-2024 04:42, rhythm more irregular Reconfirmed by Rui Alvarado ([id number]) on 8/7/2024 4:51:35 PM    XR chest 1 view    Result Date: 8/7/2024  STUDY: Chest Radiograph;  08/07/2024 2:23 PM INDICATION: Shortness of breath. COMPARISON: 06/14/2024 XR Chest. 01/12/2023 XR Chest. ACCESSION NUMBER(S): [id number] ORDERING CLINICIAN: ABRAHAM BLOXDORF TECHNIQUE:  Frontal chest was obtained at 14:23 hours. FINDINGS: CARDIOMEDIASTINAL SILHOUETTE: Heart  size is enlarged and the mediastinal contours appear stable. There is prominence of the aortic arch.  There is elevation of the right hemidiaphragm which appears stable.   LUNGS: There are basilar opacities and bilateral pleural effusions greater on the right.  The appearance of the chest is unchanged. There is pulmonary vascular congestion without pneumothorax.  ABDOMEN: No remarkable upper abdominal findings.  BONES: No acute osseous changes.    Stable appearance of the chest with cardiomegaly and pulmonary vascular congestion/pulmonary edema. Signed by Alex Fletcher, DO    CT head wo IV contrast    Result Date: 8/7/2024  Interpreted By:  Isiah Do, STUDY: CT HEAD WO IV CONTRAST;  8/7/2024 2:54 pm   INDICATION: Signs/Symptoms:dizziness.   COMPARISON: 01/08/2023   ACCESSION NUMBER(S): [id number]   ORDERING CLINICIAN: ABRAHAM CAMPBELL   TECHNIQUE: Noncontrast axial CT scan of head was performed. Angled reformats in brain and bone windows were generated. The images were reviewed in bone, brain, blood and soft tissue windows.   FINDINGS: CSF Spaces: Moderate brain atrophy evidence by prominence of ventricles, sulci and cisterns. There is no extraaxial fluid collection.   Parenchyma: Confluent areas of subcortical and periventricular white matter changes which given patient's age are suggestive of chronic small vessel ischemic disease. Focal encephalomalacia change involving inferior lobe cortex of the right cerebellum suggestive of remote infarction. Findings similar to previous exam from 01/08/2023. The grey-white differentiation is intact. There is no mass effect or midline shift.  There is no intracranial hemorrhage.   Calvarium: The calvarium is unremarkable.   Paranasal sinuses and mastoids: Visualized paranasal sinuses and mastoids are clear.       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 No evidence of acute cortical infarct or intracranial  hemorrhage. If there is persistent clinical concern for acute cortical infarction, MRI with diffusion-weighted images is a better means for further evaluation as clinically warranted.   MACRO: None   Signed by: Isiah Do 8/7/2024 3:06 PM Dictation workstation:   [id number]      Radiology:     XR chest 1 view   Final Result   No significant interval change suggestion of small bilateral pleural   effusions, congestive changes, and probable mild pulmonary edema.        Signed by: Dom Beth 8/7/2024 6:21 PM   Dictation workstation:   [id number]      CT head wo IV contrast   Final Result   Brain atrophy and findings related to remote infarction involving   right cerebellar hemisphere. No evidence of acute cortical infarct or   intracranial hemorrhage. If there is persistent clinical concern for   acute cortical infarction, MRI with diffusion-weighted images is a   better means for further evaluation as clinically warranted.        MACRO:   None        Signed by: Isiah Do 8/7/2024 3:06 PM   Dictation workstation:   [id number]      XR chest 1 view   Final Result   Stable appearance of the chest with cardiomegaly and pulmonary   vascular congestion/pulmonary edema.   Signed by Alex Fletcher DO          Assessment:     Problem List Items Addressed This Visit       Hyponatremia - Primary     Other Visit Diagnoses       Acute on chronic heart failure, unspecified heart failure type (Multi)        Relevant Medications    isosorbide mononitrate ER (Imdur) 24 hr tablet 30 mg    metoprolol succinate XL (Toprol-XL) 24 hr tablet 25 mg    Urinary tract infection without hematuria, site unspecified        Acute combined systolic and diastolic congestive heart failure (Multi)        Relevant Medications    isosorbide mononitrate ER (Imdur) 24 hr tablet 30 mg    metoprolol succinate XL (Toprol-XL) 24 hr tablet 25 mg    Acute pulmonary edema (Multi)        Pulmonary hypertension (Multi)        Acute exacerbation  "of chronic obstructive pulmonary disease (COPD) (Multi)        Hypomagnesemia        Paroxysmal atrial fibrillation (Multi)        Relevant Medications    isosorbide mononitrate ER (Imdur) 24 hr tablet 30 mg    metoprolol succinate XL (Toprol-XL) 24 hr tablet 25 mg            Patient Active Problem List   Diagnosis    Coronary artery disease involving native coronary artery of native heart without angina pectoris    Chronic systolic heart failure (Multi)    Nicotine dependence, uncomplicated    Nonrheumatic aortic valve stenosis    Shortness of breath    Acute on chronic combined systolic (congestive) and diastolic (congestive) heart failure (Multi)    Hyponatremia    Hypervolemia    Acute on chronic hypoxic respiratory failure (Multi)    COPD (chronic obstructive pulmonary disease) (Multi)    Abnormal urinalysis       Plan:   Acute on chronic systolic and diastolic heaert failure. While patients volume status is described as \"good\" for him, there is mild CHF present. Increased vascular markings without overt pulmonary edema. Agree with diuresis. Continue afterload reduction. May consider hydralazine if patient does not tolerate ACE/ARB. Is already on jardiance (continue.   CAD - no clear angina but history of CP. Conservative medical management.   Atrial fibrillation. HR controlled, patient not felt to be appropriate candidate for chronic oral anticoagulation so remains on aspirin therapy.   COPD on chronic home oxygen. Continue RX.     Given testing done in June, no repeat testing planned at this time. Will follow.       Thank you for the opportunity to participate in the care of your patient.  Please do not hesitate to call if you have any questions.    Electronically signed by Denisse Payne MD, on 8/8/2024 at 2:02 PM   "

## 2024-08-08 NOTE — PROGRESS NOTES
Physical Therapy    Physical Therapy    Physical Therapy Evaluation & Treatment    Patient Name: Milton Lane  MRN: [id number]  Today's Date: 8/8/2024 2101/2101-A    Assessment/Plan   PT Assessment  PT Assessment Results: Decreased endurance, Impaired balance, Decreased mobility  Rehab Prognosis: Good  Evaluation/Treatment Tolerance: Patient limited by fatigue (mild SOB)  End of Session Communication: Bedside nurse  Assessment Comment: Pt presents with notable decline in functional activity tolerance, mild balance impairments, and generalized decline in functional mobility. Would rec: mod-intensity skilled PT prior to DC home. Will follow while in acute care.  End of Session Patient Position: Bed, 3 rail up, Alarm on  IP OR SWING BED PT PLAN  Inpatient or Swing Bed: Inpatient  PT Plan  PT Plan: Ongoing PT  PT Frequency: 4 times per week  PT Discharge Recommendations: Moderate intensity level of continued care  Equipment Recommended upon Discharge: Wheeled walker  PT Recommended Transfer Status: Assist x1  PT - OK to Discharge: Yes , to recommended next level of care as indicated in PT eval, once cleared by medical team.      Current Problem:  Patient Active Problem List   Diagnosis    Coronary artery disease involving native coronary artery of native heart without angina pectoris    Chronic systolic heart failure (Multi)    Nicotine dependence, uncomplicated    Nonrheumatic aortic valve stenosis    Shortness of breath    Acute on chronic combined systolic (congestive) and diastolic (congestive) heart failure (Multi)    Hyponatremia    Hypervolemia    Acute on chronic hypoxic respiratory failure (Multi)    COPD (chronic obstructive pulmonary disease) (Multi)    Abnormal urinalysis       Subjective     General Visit Information:  General  Reason for Referral: impaired mobility  Referred By: Rafaela Khan NP  Past Medical History Relevant to Rehab: cardiomyopathy (EF 35%), diastolic dysfunction, mild aortic  stenosis, COPD on home O2 (ranges from 2-4 L), a-fib (no A-C), CAD s/p PCI/stent, bipolar dz, alcoholic cadiomyopathy.  Family/Caregiver Present: No  Prior to Session Communication: Bedside nurse (Pt cleared by RN for eval; pt agreeable but refused to sit up in recliner)  Patient Position Received: Bed, 3 rail up, Alarm on  General Comment: Pt admitted with reports of CP; pt's O2 ran out during power outage. Admitting dx: acute on chronic hypoxic respiratory failure. 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    Home Living:  Home Living  Home Living Comments: Resides alone in and extended stay hotel efficiency. Tub/shower combo with grab bars, no seat.    Prior Level of Function:  Prior Function Per Pt/Caregiver Report  Prior Function Comments: Mod I AMB with rollator for up to 80 ft per pt report; uses scooter for longer distances. Denies recent falls. Has not been able to get into tub x 2 weeks.    Precautions:  Precautions  Medical Precautions: Fall precautions, Seizure precautions (CIWA)  Precautions Comment: DNR    Vital Signs:  Vital Signs  Heart Rate: 94  Heart Rate Source: Monitor  SpO2: 98 % (Pt on 9L O2)  BP: 103/55  BP Location: Left arm  BP Method: Automatic  Patient Position: Lying  Objective     Pain:  Pain Assessment  Pain Assessment: 0-10  0-10 (Numeric) Pain Score: 0 - No pain    Cognition:  Cognition  Overall Cognitive Status: Within Functional Limits    General Assessments:      Activity Tolerance  Endurance: Decreased tolerance for upright activites (Fatigued and mildly SOB with OOB activity. SpO2 remained at or above 94%)  Sensation  Light Touch:  (intermittent numbness/tingling in R hand, chronic)              Static Sitting Balance  Static Sitting-Level of Assistance: Distant supervision  Static Sitting-Comment/Number of Minutes: Good  Static Standing Balance  Static Standing-Level of Assistance: Contact guard  Static Standing-Comment/Number of Minutes:  Fair-    Functional Assessments:     Bed Mobility  Bed Mobility:  (Sup to Sit with HOB elevated and supervision. Sit to sup with min A for BLE management.)  Transfers  Transfer: Yes  Transfer 1  Transfer From 1: Sit to  Transfer to 1: Stand  Transfer Device 1: Gait belt  Transfer Level of Assistance 1: Contact guard (VC's for hand placement)  Ambulation/Gait Training  Ambulation/Gait Training Performed:  (Pre-gait activities including stepping in place, sidestepping, and stepping forward and back. Pt declined to AMB to recliner.)          Extremity/Trunk Assessments:  RUE   RUE : Within Functional Limits  LUE   LUE: Within Functional Limits  RLE   RLE : Within Functional Limits  LLE   LLE : Within Functional Limits    Treatments:           Bed Mobility  Bed Mobility:  (Sup to Sit with HOB elevated and supervision. Sit to sup with min A for BLE management.)  Ambulation/Gait Training  Ambulation/Gait Training Performed:  (Pre-gait activities including stepping in place, sidestepping, and stepping forward and back. Pt declined to AMB to recliner.) Pt tolerated standing and pre-gait activities for approximatley 1 min, 15 seconds before needing to sit down d/t fatigue. CGA for sidestepping, stepping forward/back and stepping in place.  Transfers  Transfer: Yes  Transfer 1  Transfer From 1: Sit to  Transfer to 1: Stand  Transfer Device 1: Gait belt  Transfer Level of Assistance 1: Contact guard (VC's for hand placement)       Outcome Measures:  [facility] Basic Mobility  Turning from your back to your side while in a flat bed without using bedrails: A little  Moving from lying on your back to sitting on the side of a flat bed without using bedrails: A little  Moving to and from bed to chair (including a wheelchair): A little  Standing up from a chair using your arms (e.g. wheelchair or bedside chair): A little  To walk in hospital room: A lot  Climbing 3-5 steps with railing: A lot  Basic Mobility - Total Score: 16                             Goals:  Encounter Problems       Encounter Problems (Active)       PT Problem       Balance       Start:  08/08/24    Expected End:  08/22/24       Pt will improve static/dynamic balance to Good in order to improve safety with functional tasks.           Bed mobility       Start:  08/08/24    Expected End:  08/22/24       Pt transfer sup<>sit with IND           Transfers       Start:  08/08/24    Expected End:  08/22/24       Pt will transfer sit<>stand with mod I.           Gait       Start:  08/08/24    Expected End:  08/22/24       Pt will AMB 75 Feet with RW And mod I.           Endurance       Start:  08/08/24    Expected End:  08/22/24       Pt will tolerate at least 5 minutes OOB activity without SOB or reports of fatigue              Education Documentation  Precautions, taught by Kathryn Jacob PT at 8/8/2024 11:41 AM.  Learner: Patient  Readiness: Acceptance  Method: Explanation, Demonstration  Response: Verbalizes Understanding, Needs Reinforcement    Mobility Training, taught by Kathryn Jacob PT at 8/8/2024 11:41 AM.  Learner: Patient  Readiness: Acceptance  Method: Explanation, Demonstration  Response: Verbalizes Understanding, Needs Reinforcement    Education Comments  No comments found.

## 2024-08-08 NOTE — CARE PLAN
Problem: Skin  Goal: Prevent/manage excess moisture  Outcome: Progressing     Problem: Respiratory  Goal: Minimal/no exertional discomfort or dyspnea this shift  Outcome: Met  Goal: No signs of respiratory distress (eg. Use of accessory muscles. Peds grunting)  Outcome: Met     Problem: Safety - Adult  Goal: Free from fall injury  Outcome: Met     Pt remained hemodynamically stable throughout shift. Pt maintained oxygen saturation above 92% throughout shift, 10L oximizer. Pt had no complaints of pain or discomfort throughout shift. This morning pt reported that he took his own medications from home without notifying staff. NP and pharmacy notified (see previous notes). Pt up to chair for a few hours this shift. Pt CIWA score remained 0 throughout shift. Safety maintained. Alarms on.

## 2024-08-08 NOTE — PROGRESS NOTES
"Occupational Therapy                 Therapy Communication Note    Patient Name: Milton Lane  MRN: [id number]  Today's Date: 8/8/2024     Discipline: Occupational Therapy    Missed Visit Reason: Missed Visit Reason: Parent refused    Missed Time: Attempt    Comment: Received orders for OT eval 8/7. Completed chart review, and attempted OT eval. Tried to motivate pt to work with OT, and possibly get OOB to eat lunch (which was just delivered). Pt refused at this time.  Pt with complaints of weakness and mobility, yet refuses activity to increase endurance and strength. Pt stated wanting to ambulate, but then refusing (blaming it on \"all the lines\"). Explained to pt that he would be able to ambulate, but still refused.   Asked pt to attempt mobility, but refused and wanted to eat lunch.   Will hold OT eval and completed as appropriate.   "

## 2024-08-08 NOTE — H&P
History Of Present Illness  Milton Lane is a 74 y.o. M with PMHX  Cardiomyopathy, mild aortic stenosis, COPD on 2L O2, , PAF no AC, CAD/PCI/stent;  presenting to ER from [facility] hotel  on 8/7/2024 with worsened SOB and occasional CP.  He denied any fever or chills, no nausea vomiting or any urinary symptoms.  Patient's workup in the ER showed acute on chronic respiratory failure requiring BiPAP and severe hyponatremia with sodium being 115.  He was admitted in stepdown unit for further care            Past Medical History  He has a past medical history of Bipolar 1 disorder (Multi), Chronic systolic heart failure (Multi) (12/15/2023), COPD (chronic obstructive pulmonary disease) (Multi), Coronary artery disease involving native coronary artery of native heart without angina pectoris (12/15/2023), Hypertension, Iron deficiency anemia, Nicotine dependence, uncomplicated (12/15/2023), Nonrheumatic aortic valve stenosis (12/15/2023), Obese, PAF (paroxysmal atrial fibrillation) (Multi), and PTSD (post-traumatic stress disorder).    Surgical History  He has no past surgical history on file.     Social History  He reports that he has been smoking cigarettes. He has never used smokeless tobacco. He reports current alcohol use. He reports that he does not use drugs.    Family History  No family history on file.     Allergies  Flu vaccine 2011-12(3 yr+)(pf) and Losartan    Current medications:      Current Facility-Administered Medications:     acetaminophen (Tylenol) tablet 650 mg, 650 mg, oral, q4h PRN **OR** acetaminophen (Tylenol) oral liquid 650 mg, 650 mg, oral, q4h PRN **OR** acetaminophen (Tylenol) suppository 650 mg, 650 mg, rectal, q4h PRN, DEBBIE Holden    albuterol 2.5 mg /3 mL (0.083 %) nebulizer solution 2.5 mg, 2.5 mg, nebulization, q4h PRN, DAVID Holden-CNP    amiodarone (Pacerone) tablet 200 mg, 200 mg, oral, Daily, DEBBIE Holden    aspirin EC tablet 81 mg, 81 mg, oral,  Daily, DEBBIE Holden    atorvastatin (Lipitor) tablet 20 mg, 20 mg, oral, Daily, DEBBIE Holden    azithromycin (Zithromax) in dextrose 5 % in water (D5W) 250 mL  mg, 500 mg, intravenous, q24h, DAVID Holden-CNP, Stopped at 08/08/24 0101    budesonide (Pulmicort) 0.5 mg/2 mL nebulizer solution 0.5 mg, 0.5 mg, nebulization, BID, DAVID Holden-CNP, 0.5 mg at 08/08/24 0809    cefTRIAXone (Rocephin) 1 g in dextrose (iso) IV 50 mL, 1 g, intravenous, q24h, DEBBIE Holden    dextrose 50 % injection 12.5 g, 12.5 g, intravenous, q15 min PRN, DAVID Holden-CNP    dextrose 50 % injection 25 g, 25 g, intravenous, q15 min PRN, DEBBIE Holden    empagliflozin (Jardiance) tablet 12.5 mg, 12.5 mg, oral, Daily, DEBBIE Holden    enoxaparin (Lovenox) syringe 40 mg, 40 mg, subcutaneous, q24h, DAVID Holden-CNP, 40 mg at 08/07/24 2032    fluticasone (Flonase) nasal spray 2 spray, 2 spray, Each Nostril, Daily, DEBBIE Holden    folic acid (Folvite) tablet 1 mg, 1 mg, oral, Daily, DAVID Holden-CNP, 1 mg at 08/07/24 1841    formoterol (Perforomist) 20 mcg/2 mL nebulizer solution 20 mcg, 20 mcg, nebulization, BID, DAVID Holden-CNP, 20 mcg at 08/08/24 0810    furosemide (Lasix) 500 mg in 50 mL (10 mg/mL) infusion, 5 mg/hr, intravenous, Continuous, Sofiya Greenwood MD, Last Rate: 0.5 mL/hr at 08/07/24 2032, 5 mg/hr at 08/07/24 2032    glucagon (Glucagen) injection 1 mg, 1 mg, intramuscular, q15 min PRN, DEBBIE Holden    glucagon (Glucagen) injection 1 mg, 1 mg, intramuscular, q15 min PRN, DEBBIE Holden    insulin lispro (HumaLOG) injection 0-5 Units, 0-5 Units, subcutaneous, 4x daily, DEBBIE Holden    ipratropium-albuteroL (Duo-Neb) 0.5-2.5 mg/3 mL nebulizer solution 3 mL, 3 mL, nebulization, q6h, DEBBIE Holden, 3 mL at 08/08/24 0810    isosorbide mononitrate ER (Imdur)  24 hr tablet 30 mg, 30 mg, oral, Daily, DEBBIE Holden    lamoTRIgine (LaMICtal) tablet 100 mg, 100 mg, oral, Daily, DEBBIE Holden    LORazepam (Ativan) injection 0.5 mg, 0.5 mg, intravenous, q2h PRN **OR** LORazepam (Ativan) injection 1 mg, 1 mg, intravenous, q2h PRN **OR** LORazepam (Ativan) injection 2 mg, 2 mg, intravenous, q2h PRN, DEBBIE Holden    melatonin tablet 3 mg, 3 mg, oral, Nightly PRN, DEBBIE Holden    methylPREDNISolone sod succinate (SOLU-Medrol) 40 mg/mL injection 40 mg, 40 mg, intravenous, q12h, Sofiya Greenwood MD, 40 mg at 08/08/24 0610    metoprolol succinate XL (Toprol-XL) 24 hr tablet 25 mg, 25 mg, oral, Daily, DEBBIE Holden    multivitamin with minerals 1 tablet, 1 tablet, oral, Daily, DEBBIE Holden, 1 tablet at 08/07/24 1841    nicotine (Nicoderm CQ) 7 mg/24 hr patch 1 patch, 1 patch, transdermal, q24h, DEBBIE Holden    oxygen (O2) therapy, , inhalation, Continuous - Inhalation, DEBBIE Pompa, 80 percent at 08/07/24 2000    polyethylene glycol (Glycolax, Miralax) packet 17 g, 17 g, oral, Daily, DEBBIE Holden    [Held by provider] spironolactone (Aldactone) tablet 12.5 mg, 12.5 mg, oral, BID, DEBBIE Holden    [START ON 8/10/2024] thiamine (Vitamin B-1) tablet 100 mg, 100 mg, oral, Daily, DEBBIE Holden    thiamine (Vitamin B1) injection 100 mg, 100 mg, intravenous, Daily, DEBBIE Holden, 100 mg at 08/07/24 1849    traZODone (Desyrel) tablet 50 mg, 50 mg, oral, Nightly, Stacey Hernandez, APRN-CNP       Review of Systems    10 organ ROS is pertinent for history mentioned above, otherwise (-)ve        Physical Exam  HENT:      Head: Normocephalic.   Eyes:      Conjunctiva/sclera: Conjunctivae normal.   Cardiovascular:      Rate and Rhythm: Regular rhythm.   Pulmonary:      Breath sounds: Normal breath sounds.   Abdominal:      General: Bowel sounds are  "normal.      Palpations: Abdomen is soft.   Musculoskeletal:         General: Normal range of motion.   Skin:     General: Skin is warm and dry.   Neurological:      General: No focal deficit present.      Mental Status: He is alert.   Psychiatric:         Behavior: Behavior normal.              Last Recorded Vitals      Blood pressure 116/58, pulse 82, temperature 36.5 °C (97.7 °F), temperature source Temporal, resp. rate 18, height 1.753 m (5' 9.02\"), weight 93.3 kg (205 lb 11 oz), SpO2 94%.    Relevant Results     Results for orders placed or performed during the hospital encounter of 08/07/24 (from the past 24 hour(s))   CBC and Auto Differential   Result Value Ref Range    WBC 7.4 4.4 - 11.3 x10*3/uL    nRBC 0.0 0.0 - 0.0 /100 WBCs    RBC 4.00 (L) 4.50 - 5.90 x10*6/uL    Hemoglobin 12.0 (L) 13.5 - 17.5 g/dL    Hematocrit 35.2 (L) 41.0 - 52.0 %    MCV 88 80 - 100 fL    MCH 30.0 26.0 - 34.0 pg    MCHC 34.1 32.0 - 36.0 g/dL    RDW 15.7 (H) 11.5 - 14.5 %    Platelets 248 150 - 450 x10*3/uL    Neutrophils % 82.5 40.0 - 80.0 %    Immature Granulocytes %, Automated 0.7 0.0 - 0.9 %    Lymphocytes % 6.7 13.0 - 44.0 %    Monocytes % 9.8 2.0 - 10.0 %    Eosinophils % 0.0 0.0 - 6.0 %    Basophils % 0.3 0.0 - 2.0 %    Neutrophils Absolute 6.08 (H) 1.60 - 5.50 x10*3/uL    Immature Granulocytes Absolute, Automated 0.05 0.00 - 0.50 x10*3/uL    Lymphocytes Absolute 0.49 (L) 0.80 - 3.00 x10*3/uL    Monocytes Absolute 0.72 0.05 - 0.80 x10*3/uL    Eosinophils Absolute 0.00 0.00 - 0.40 x10*3/uL    Basophils Absolute 0.02 0.00 - 0.10 x10*3/uL   Comprehensive metabolic panel   Result Value Ref Range    Glucose 124 (H) 74 - 99 mg/dL    Sodium 115 (LL) 136 - 145 mmol/L    Potassium 4.6 3.5 - 5.3 mmol/L    Chloride 76 (L) 98 - 107 mmol/L    Bicarbonate 29 21 - 32 mmol/L    Anion Gap 15 10 - 20 mmol/L    Urea Nitrogen 20 6 - 23 mg/dL    Creatinine 0.79 0.50 - 1.30 mg/dL    eGFR >90 >60 mL/min/1.73m*2    Calcium 8.9 8.6 - 10.3 mg/dL    " Albumin 3.9 3.4 - 5.0 g/dL    Alkaline Phosphatase 87 33 - 136 U/L    Total Protein 7.8 6.4 - 8.2 g/dL    AST 23 9 - 39 U/L    Bilirubin, Total 1.3 (H) 0.0 - 1.2 mg/dL    ALT 12 10 - 52 U/L   Magnesium   Result Value Ref Range    Magnesium 1.80 1.60 - 2.40 mg/dL   Protime-INR   Result Value Ref Range    Protime 14.2 (H) 9.8 - 12.8 seconds    INR 1.3 (H) 0.9 - 1.1   B-Type Natriuretic Peptide   Result Value Ref Range    BNP 2,457 (H) 0 - 99 pg/mL   Blood Gas Venous Full Panel   Result Value Ref Range    POCT pH, Venous 7.44 (H) 7.33 - 7.43 pH    POCT pCO2, Venous 42 41 - 51 mm Hg    POCT pO2, Venous 68 (H) 35 - 45 mm Hg    POCT SO2, Venous 94 (H) 45 - 75 %    POCT Oxy Hemoglobin, Venous 90.5 (H) 45.0 - 75.0 %    POCT Hematocrit Calculated, Venous 38.0 (L) 41.0 - 52.0 %    POCT Sodium, Venous 111 (LL) 136 - 145 mmol/L    POCT Potassium, Venous 4.9 3.5 - 5.3 mmol/L    POCT Chloride, Venous 80 (L) 98 - 107 mmol/L    POCT Ionized Calicum, Venous 1.07 (L) 1.10 - 1.33 mmol/L    POCT Glucose, Venous 129 (H) 74 - 99 mg/dL    POCT Lactate, Venous 1.6 0.4 - 2.0 mmol/L    POCT Base Excess, Venous 3.9 (H) -2.0 - 3.0 mmol/L    POCT HCO3 Calculated, Venous 28.5 (H) 22.0 - 26.0 mmol/L    POCT Hemoglobin, Venous 12.6 (L) 13.5 - 17.5 g/dL    POCT Anion Gap, Venous 7.0 (L) 10.0 - 25.0 mmol/L    Patient Temperature      FiO2 32 %    Critical Called By RT MT     Critical Called To MD CAZARES     Critical Call Time 1326     Critical Read Back Y    Troponin I, High Sensitivity, Initial   Result Value Ref Range    Troponin I, High Sensitivity 72 (HH) 0 - 20 ng/L   Ethanol   Result Value Ref Range    Alcohol <10 <=10 mg/dL   ECG 12 lead   Result Value Ref Range    Ventricular Rate 80 BPM    Atrial Rate 54 BPM    QRS Duration 108 ms    QT Interval 422 ms    QTC Calculation(Bazett) 486 ms    R Axis 97 degrees    T Axis 33 degrees    QRS Count 13 beats    Q Onset 209 ms    T Offset 420 ms    QTC Fredericia 464 ms   Troponin, High Sensitivity, 1  Hour   Result Value Ref Range    Troponin I, High Sensitivity 86 (HH) 0 - 20 ng/L   Osmolality   Result Value Ref Range    Osmolality, Serum 250 (L) 280 - 300 mOsm/kg   Urinalysis with Reflex Culture and Microscopic   Result Value Ref Range    Color, Urine Light-Yellow Light-Yellow, Yellow, Dark-Yellow    Appearance, Urine Turbid (N) Clear    Specific Gravity, Urine 1.005 1.005 - 1.035    pH, Urine 7.0 5.0, 5.5, 6.0, 6.5, 7.0, 7.5, 8.0    Protein, Urine 10 (TRACE) NEGATIVE, 10 (TRACE), 20 (TRACE) mg/dL    Glucose, Urine 300 (3+) (A) Normal mg/dL    Blood, Urine 0.03 (TRACE) (A) NEGATIVE    Ketones, Urine NEGATIVE NEGATIVE mg/dL    Bilirubin, Urine NEGATIVE NEGATIVE    Urobilinogen, Urine Normal Normal mg/dL    Nitrite, Urine 1+ (A) NEGATIVE    Leukocyte Esterase, Urine 500 Constance/µL (A) NEGATIVE   Extra Urine Gray Tube   Result Value Ref Range    Extra Tube Hold for add-ons.    Osmolality, urine   Result Value Ref Range    Osmolality, Urine Random 198 (L) 200 - 1,200 mOsm/kg   Microscopic Only, Urine   Result Value Ref Range    WBC, Urine >50 (A) 1-5, NONE /HPF    WBC Clumps, Urine RARE Reference range not established. /HPF    RBC, Urine 6-10 (A) NONE, 1-2, 3-5 /HPF    Bacteria, Urine 1+ (A) NONE SEEN /HPF    Mucus, Urine FEW Reference range not established. /LPF   Blood Gas Arterial Full Panel   Result Value Ref Range    POCT pH, Arterial 7.39 7.38 - 7.42 pH    POCT pCO2, Arterial 49 (H) 38 - 42 mm Hg    POCT pO2, Arterial 119 (H) 85 - 95 mm Hg    POCT SO2, Arterial 100 94 - 100 %    POCT Oxy Hemoglobin, Arterial 96.4 94.0 - 98.0 %    POCT Hematocrit Calculated, Arterial 37.0 (L) 41.0 - 52.0 %    POCT Sodium, Arterial 116 (LL) 136 - 145 mmol/L    POCT Potassium, Arterial 4.4 3.5 - 5.3 mmol/L    POCT Chloride, Arterial 82 (L) 98 - 107 mmol/L    POCT Ionized Calcium, Arterial 1.11 1.10 - 1.33 mmol/L    POCT Glucose, Arterial 130 (H) 74 - 99 mg/dL    POCT Lactate, Arterial 1.3 0.4 - 2.0 mmol/L    POCT Base Excess,  Arterial 3.9 (H) -2.0 - 3.0 mmol/L    POCT HCO3 Calculated, Arterial 29.7 (H) 22.0 - 26.0 mmol/L    POCT Hemoglobin, Arterial 12.2 (L) 13.5 - 17.5 g/dL    POCT Anion Gap, Arterial 9 (L) 10 - 25 mmo/L    Patient Temperature      FiO2 50 %    Critical Called By RT MT     Critical Called To MD CAZARES     Critical Call Time 1609     Critical Read Back Y    Procalcitonin   Result Value Ref Range    Procalcitonin 0.06 <=0.07 ng/mL   Basic Metabolic Panel   Result Value Ref Range    Glucose 138 (H) 74 - 99 mg/dL    Sodium 121 (L) 136 - 145 mmol/L    Potassium 4.4 3.5 - 5.3 mmol/L    Chloride 81 (L) 98 - 107 mmol/L    Bicarbonate 30 21 - 32 mmol/L    Anion Gap 14 10 - 20 mmol/L    Urea Nitrogen 21 6 - 23 mg/dL    Creatinine 0.82 0.50 - 1.30 mg/dL    eGFR >90 >60 mL/min/1.73m*2    Calcium 8.9 8.6 - 10.3 mg/dL   Troponin I, High Sensitivity   Result Value Ref Range    Troponin I, High Sensitivity 132 (HH) 0 - 20 ng/L   POCT GLUCOSE   Result Value Ref Range    POCT Glucose 133 (H) 74 - 99 mg/dL   Basic Metabolic Panel   Result Value Ref Range    Glucose 202 (H) 74 - 99 mg/dL    Sodium 121 (L) 136 - 145 mmol/L    Potassium 4.6 3.5 - 5.3 mmol/L    Chloride 81 (L) 98 - 107 mmol/L    Bicarbonate 30 21 - 32 mmol/L    Anion Gap 15 10 - 20 mmol/L    Urea Nitrogen 23 6 - 23 mg/dL    Creatinine 0.91 0.50 - 1.30 mg/dL    eGFR 87 >60 mL/min/1.73m*2    Calcium 9.2 8.6 - 10.3 mg/dL   CBC   Result Value Ref Range    WBC 4.5 4.4 - 11.3 x10*3/uL    nRBC 0.0 0.0 - 0.0 /100 WBCs    RBC 3.67 (L) 4.50 - 5.90 x10*6/uL    Hemoglobin 11.0 (L) 13.5 - 17.5 g/dL    Hematocrit 33.4 (L) 41.0 - 52.0 %    MCV 91 80 - 100 fL    MCH 30.0 26.0 - 34.0 pg    MCHC 32.9 32.0 - 36.0 g/dL    RDW 15.9 (H) 11.5 - 14.5 %    Platelets 224 150 - 450 x10*3/uL   Basic metabolic panel   Result Value Ref Range    Glucose 145 (H) 74 - 99 mg/dL    Sodium 122 (L) 136 - 145 mmol/L    Potassium 4.6 3.5 - 5.3 mmol/L    Chloride 83 (L) 98 - 107 mmol/L    Bicarbonate 30 21 - 32 mmol/L     Anion Gap 14 10 - 20 mmol/L    Urea Nitrogen 25 (H) 6 - 23 mg/dL    Creatinine 0.88 0.50 - 1.30 mg/dL    eGFR 89 >60 mL/min/1.73m*2    Calcium 8.9 8.6 - 10.3 mg/dL   Phosphorus   Result Value Ref Range    Phosphorus 4.9 2.5 - 4.9 mg/dL   Magnesium   Result Value Ref Range    Magnesium 2.24 1.60 - 2.40 mg/dL   POCT GLUCOSE   Result Value Ref Range    POCT Glucose 134 (H) 74 - 99 mg/dL   Basic Metabolic Panel   Result Value Ref Range    Glucose 186 (H) 74 - 99 mg/dL    Sodium 126 (L) 136 - 145 mmol/L    Potassium 4.7 3.5 - 5.3 mmol/L    Chloride 85 (L) 98 - 107 mmol/L    Bicarbonate 33 (H) 21 - 32 mmol/L    Anion Gap 13 10 - 20 mmol/L    Urea Nitrogen 27 (H) 6 - 23 mg/dL    Creatinine 0.94 0.50 - 1.30 mg/dL    eGFR 84 >60 mL/min/1.73m*2    Calcium 8.9 8.6 - 10.3 mg/dL          Radiology  Chest Radiograph;  08/07/2024 2:23 PM  INDICATION:  Shortness of breath.  COMPARISON:  06/14/2024 XR Chest. 01/12/2023 XR Chest.   ACCESSION NUMBER(S):  [id number]  ORDERING CLINICIAN:  ABRAHAM CAMPBELL  TECHNIQUE:  Frontal chest was obtained at 14:23 hours.  FINDINGS:  CARDIOMEDIASTINAL SILHOUETTE:  Heart size is enlarged and the mediastinal contours appear stable.   There is prominence of the aortic arch.  There is elevation of the  right hemidiaphragm which appears stable.       LUNGS:  There are basilar opacities and bilateral pleural effusions greater on  the right.  The appearance of the chest is unchanged.  There is pulmonary vascular congestion without pneumothorax.     ABDOMEN:  No remarkable upper abdominal findings.     BONES:  No acute osseous changes.  IMPRESSION:  Stable appearance of the chest with cardiomegaly and pulmonary  vascular congestion/pulmonary edema.       Assessment/Plan   Principal Problem:    Acute on chronic hypoxic respiratory failure (Multi)  Active Problems:    Acute on chronic combined systolic (congestive) and diastolic (congestive) heart failure (Multi)    Hyponatremia    Hypervolemia    COPD  (chronic obstructive pulmonary disease) (Multi)    Abnormal urinalysis        PLAN: Patient was admitted on stepdown unit, was started on a CPAP for respiratory failure, IV Lasix, IV azithromycin, Rocephin oxygen, tolvaptan for hyponatremia, nephrology and cardiology consult, discussed with nursing and CNP, follow closely, I have coordinated the care.          Rishi Anthony MD
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
## 2024-08-09 LAB
ANION GAP SERPL CALC-SCNC: 10 MMOL/L (ref 10–20)
ANION GAP SERPL CALC-SCNC: 11 MMOL/L (ref 10–20)
ANION GAP SERPL CALC-SCNC: 14 MMOL/L (ref 10–20)
BUN SERPL-MCNC: 38 MG/DL (ref 6–23)
BUN SERPL-MCNC: 46 MG/DL (ref 6–23)
BUN SERPL-MCNC: 46 MG/DL (ref 6–23)
CALCIUM SERPL-MCNC: 8.5 MG/DL (ref 8.6–10.3)
CALCIUM SERPL-MCNC: 8.8 MG/DL (ref 8.6–10.3)
CALCIUM SERPL-MCNC: 8.8 MG/DL (ref 8.6–10.3)
CHLORIDE SERPL-SCNC: 84 MMOL/L (ref 98–107)
CHLORIDE SERPL-SCNC: 84 MMOL/L (ref 98–107)
CHLORIDE SERPL-SCNC: 86 MMOL/L (ref 98–107)
CO2 SERPL-SCNC: 28 MMOL/L (ref 21–32)
CO2 SERPL-SCNC: 35 MMOL/L (ref 21–32)
CO2 SERPL-SCNC: 36 MMOL/L (ref 21–32)
CREAT SERPL-MCNC: 1.24 MG/DL (ref 0.5–1.3)
CREAT SERPL-MCNC: 1.31 MG/DL (ref 0.5–1.3)
CREAT SERPL-MCNC: 1.36 MG/DL (ref 0.5–1.3)
EGFRCR SERPLBLD CKD-EPI 2021: 54 ML/MIN/1.73M*2
EGFRCR SERPLBLD CKD-EPI 2021: 56 ML/MIN/1.73M*2
EGFRCR SERPLBLD CKD-EPI 2021: 60 ML/MIN/1.73M*2
ERYTHROCYTE [DISTWIDTH] IN BLOOD BY AUTOMATED COUNT: 16 % (ref 11.5–14.5)
GLUCOSE BLD MANUAL STRIP-MCNC: 126 MG/DL (ref 74–99)
GLUCOSE BLD MANUAL STRIP-MCNC: 136 MG/DL (ref 74–99)
GLUCOSE BLD MANUAL STRIP-MCNC: 147 MG/DL (ref 74–99)
GLUCOSE BLD MANUAL STRIP-MCNC: 149 MG/DL (ref 74–99)
GLUCOSE SERPL-MCNC: 132 MG/DL (ref 74–99)
GLUCOSE SERPL-MCNC: 141 MG/DL (ref 74–99)
GLUCOSE SERPL-MCNC: 166 MG/DL (ref 74–99)
HCT VFR BLD AUTO: 34.4 % (ref 41–52)
HGB BLD-MCNC: 10.8 G/DL (ref 13.5–17.5)
HOLD SPECIMEN: NORMAL
MAGNESIUM SERPL-MCNC: 2.39 MG/DL (ref 1.6–2.4)
MCH RBC QN AUTO: 29.9 PG (ref 26–34)
MCHC RBC AUTO-ENTMCNC: 31.4 G/DL (ref 32–36)
MCV RBC AUTO: 95 FL (ref 80–100)
NRBC BLD-RTO: 0 /100 WBCS (ref 0–0)
PLATELET # BLD AUTO: 205 X10*3/UL (ref 150–450)
POTASSIUM SERPL-SCNC: 4.8 MMOL/L (ref 3.5–5.3)
POTASSIUM SERPL-SCNC: 4.9 MMOL/L (ref 3.5–5.3)
POTASSIUM SERPL-SCNC: 5.1 MMOL/L (ref 3.5–5.3)
RBC # BLD AUTO: 3.61 X10*6/UL (ref 4.5–5.9)
SODIUM SERPL-SCNC: 123 MMOL/L (ref 136–145)
SODIUM SERPL-SCNC: 125 MMOL/L (ref 136–145)
SODIUM SERPL-SCNC: 125 MMOL/L (ref 136–145)
WBC # BLD AUTO: 11.1 X10*3/UL (ref 4.4–11.3)

## 2024-08-09 PROCEDURE — 2500000005 HC RX 250 GENERAL PHARMACY W/O HCPCS: Performed by: NURSE PRACTITIONER

## 2024-08-09 PROCEDURE — 82947 ASSAY GLUCOSE BLOOD QUANT: CPT

## 2024-08-09 PROCEDURE — 2500000001 HC RX 250 WO HCPCS SELF ADMINISTERED DRUGS (ALT 637 FOR MEDICARE OP): Performed by: NURSE PRACTITIONER

## 2024-08-09 PROCEDURE — 2500000004 HC RX 250 GENERAL PHARMACY W/ HCPCS (ALT 636 FOR OP/ED): Performed by: INTERNAL MEDICINE

## 2024-08-09 PROCEDURE — 2500000005 HC RX 250 GENERAL PHARMACY W/O HCPCS: Performed by: INTERNAL MEDICINE

## 2024-08-09 PROCEDURE — S4991 NICOTINE PATCH NONLEGEND: HCPCS | Performed by: NURSE PRACTITIONER

## 2024-08-09 PROCEDURE — 82374 ASSAY BLOOD CARBON DIOXIDE: CPT | Performed by: NURSE PRACTITIONER

## 2024-08-09 PROCEDURE — 80048 BASIC METABOLIC PNL TOTAL CA: CPT | Performed by: NURSE PRACTITIONER

## 2024-08-09 PROCEDURE — 2500000001 HC RX 250 WO HCPCS SELF ADMINISTERED DRUGS (ALT 637 FOR MEDICARE OP)

## 2024-08-09 PROCEDURE — 36415 COLL VENOUS BLD VENIPUNCTURE: CPT | Performed by: NURSE PRACTITIONER

## 2024-08-09 PROCEDURE — 83735 ASSAY OF MAGNESIUM: CPT | Performed by: NURSE PRACTITIONER

## 2024-08-09 PROCEDURE — 99232 SBSQ HOSP IP/OBS MODERATE 35: CPT | Performed by: INTERNAL MEDICINE

## 2024-08-09 PROCEDURE — 2500000004 HC RX 250 GENERAL PHARMACY W/ HCPCS (ALT 636 FOR OP/ED): Performed by: NURSE PRACTITIONER

## 2024-08-09 PROCEDURE — 94660 CPAP INITIATION&MGMT: CPT

## 2024-08-09 PROCEDURE — 2060000001 HC INTERMEDIATE ICU ROOM DAILY

## 2024-08-09 PROCEDURE — 2500000002 HC RX 250 W HCPCS SELF ADMINISTERED DRUGS (ALT 637 FOR MEDICARE OP, ALT 636 FOR OP/ED)

## 2024-08-09 PROCEDURE — 94640 AIRWAY INHALATION TREATMENT: CPT

## 2024-08-09 PROCEDURE — 2500000002 HC RX 250 W HCPCS SELF ADMINISTERED DRUGS (ALT 637 FOR MEDICARE OP, ALT 636 FOR OP/ED): Performed by: NURSE PRACTITIONER

## 2024-08-09 PROCEDURE — 85027 COMPLETE CBC AUTOMATED: CPT | Performed by: NURSE PRACTITIONER

## 2024-08-09 RX ORDER — BUMETANIDE 1 MG/1
3 TABLET ORAL
Status: DISCONTINUED | OUTPATIENT
Start: 2024-08-10 | End: 2024-08-09

## 2024-08-09 RX ORDER — BUMETANIDE 1 MG/1
3 TABLET ORAL
Status: DISPENSED | OUTPATIENT
Start: 2024-08-10

## 2024-08-09 RX ORDER — LIDOCAINE 560 MG/1
1 PATCH PERCUTANEOUS; TOPICAL; TRANSDERMAL DAILY
Status: DISPENSED | OUTPATIENT
Start: 2024-08-09

## 2024-08-09 RX ORDER — LANOLIN ALCOHOL/MO/W.PET/CERES
100 CREAM (GRAM) TOPICAL DAILY
Status: DISPENSED | OUTPATIENT
Start: 2024-08-09

## 2024-08-09 ASSESSMENT — PAIN - FUNCTIONAL ASSESSMENT
PAIN_FUNCTIONAL_ASSESSMENT: 0-10

## 2024-08-09 ASSESSMENT — COGNITIVE AND FUNCTIONAL STATUS - GENERAL
MOVING TO AND FROM BED TO CHAIR: A LITTLE
DRESSING REGULAR UPPER BODY CLOTHING: A LITTLE
MOVING FROM LYING ON BACK TO SITTING ON SIDE OF FLAT BED WITH BEDRAILS: A LITTLE
TOILETING: A LOT
PERSONAL GROOMING: A LITTLE
HELP NEEDED FOR BATHING: A LOT
DAILY ACTIVITIY SCORE: 16
STANDING UP FROM CHAIR USING ARMS: A LITTLE
CLIMB 3 TO 5 STEPS WITH RAILING: TOTAL
DRESSING REGULAR LOWER BODY CLOTHING: A LOT
TURNING FROM BACK TO SIDE WHILE IN FLAT BAD: A LITTLE
WALKING IN HOSPITAL ROOM: A LITTLE
MOBILITY SCORE: 16

## 2024-08-09 ASSESSMENT — PAIN SCALES - GENERAL
PAINLEVEL_OUTOF10: 5 - MODERATE PAIN
PAINLEVEL_OUTOF10: 0 - NO PAIN
PAINLEVEL_OUTOF10: 0 - NO PAIN
PAINLEVEL_OUTOF10: 2
PAINLEVEL_OUTOF10: 0 - NO PAIN

## 2024-08-09 ASSESSMENT — LIFESTYLE VARIABLES
ORIENTATION AND CLOUDING OF SENSORIUM: CANNOT DO SERIAL ADDITIONS OR IS UNCERTAIN ABOUT DATE
TOTAL SCORE: 2
AGITATION: NORMAL ACTIVITY
VISUAL DISTURBANCES: NOT PRESENT
PAROXYSMAL SWEATS: BARELY PERCEPTIBLE SWEATING, PALMS MOIST
AUDITORY DISTURBANCES: NOT PRESENT
HEADACHE, FULLNESS IN HEAD: NOT PRESENT
AGITATION: NORMAL ACTIVITY
NAUSEA AND VOMITING: NO NAUSEA AND NO VOMITING
PAROXYSMAL SWEATS: NO SWEAT VISIBLE
ORIENTATION AND CLOUDING OF SENSORIUM: ORIENTED AND CAN DO SERIAL ADDITIONS
NAUSEA AND VOMITING: NO NAUSEA AND NO VOMITING
ANXIETY: NO ANXIETY, AT EASE
TREMOR: NO TREMOR
HEADACHE, FULLNESS IN HEAD: NOT PRESENT
ANXIETY: MILDLY ANXIOUS
ORIENTATION AND CLOUDING OF SENSORIUM: ORIENTED AND CAN DO SERIAL ADDITIONS
ANXIETY: NO ANXIETY, AT EASE
PAROXYSMAL SWEATS: BARELY PERCEPTIBLE SWEATING, PALMS MOIST
AUDITORY DISTURBANCES: NOT PRESENT
TREMOR: NO TREMOR
HEADACHE, FULLNESS IN HEAD: NOT PRESENT
AUDITORY DISTURBANCES: NOT PRESENT
TOTAL SCORE: 1
AGITATION: NORMAL ACTIVITY
NAUSEA AND VOMITING: NO NAUSEA AND NO VOMITING
VISUAL DISTURBANCES: NOT PRESENT
VISUAL DISTURBANCES: NOT PRESENT
TREMOR: NO TREMOR
TOTAL SCORE: 1
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
## 2024-08-09 NOTE — PROGRESS NOTES
"Nutrition Progress Note    Pt advised by Cardiology team to receive the therapeutic \"Meals on Wheels\" provided by this hospital more than just 3 times weekly. Pt current receives the Cardiac meal plan MWF that maintain the sodium <3,000mg and fat <60g for the 2 meals provided on these days and supplements with frozen dinners on the other days. The pt states he cannot afford the remaining 4 days of meals @ $9/day for a total of $36 more per week. Pt continues to desire to live independently and not in a facility, but unfortunately his current living arrangements in a hotel (without a working phone and healthy meals) are not helping his declining cardiac status.     If pt returns to living independently where Meals on Wheels deliver, we will see that pt receives his food preferences (no fish) and will increase to every day if he agrees.     Will continue to follow discharge planning and provide further nutrition intervention as needed.  "

## 2024-08-09 NOTE — CARE PLAN
SHIFT NOTE    Diagnosis:  Acute on chronic respiratory failure    Assessment:    Patient remains hds. He remains on either 9 L oximizer or CPAP HS. No complaints of SOB or difficulty breathing. He's been afebrile and had no complaints of pain. Good urine output.     Hourly rounding was performed and patient needs were met. Call light within reach. No other acute events, will continue to monitor.         The patient's goals for the shift include      Problem: Skin  Goal: Participates in plan/prevention/treatment measures  8/9/2024 0649 by Padmaja Leo RN  Outcome: Progressing  8/8/2024 2110 by Padmaja Leo RN  Flowsheets (Taken 8/7/2024 2237)  Participates in plan/prevention/treatment measures:   Elevate heels   Increase activity/out of bed for meals     Problem: Skin  Goal: Prevent/manage excess moisture  8/9/2024 0649 by Padmaja Leo RN  Outcome: Progressing  8/8/2024 2110 by Padmaja Leo RN  Flowsheets (Taken 8/8/2024 1555 by Magda Cardona RN)  Prevent/manage excess moisture: Monitor for/manage infection if present     Problem: Skin  Goal: Prevent/minimize sheer/friction injuries  8/9/2024 0649 by Padmaja Leo RN  Outcome: Progressing  8/8/2024 2110 by Padmaja Leo RN  Flowsheets (Taken 8/7/2024 2237)  Prevent/minimize sheer/friction injuries:   Complete micro-shifts as needed if patient unable. Adjust patient position to relieve pressure points, not a full turn   Increase activity/out of bed for meals   Use pull sheet   HOB 30 degrees or less   Turn/reposition every 2 hours/use positioning/transfer devices     Problem: Pain  Goal: Takes deep breaths with improved pain control throughout the shift  Outcome: Progressing       Problem: Pain  Goal: Turns in bed with improved pain control throughout the shift  Outcome: Progressing     Problem: Respiratory  Goal: Minimize anxiety/maximize coping throughout shift  8/9/2024 0649 by Padmaja Leo RN  Outcome: Progressing  8/8/2024 2110 by Padmaja Leo  RN  Flowsheets (Taken 8/7/2024 2237)  Minimize anxiety/maximize coping throughout shift:   Med administration/monitoring of effect   Monitor pain/anxiety level     Problem: Discharge Planning  Goal: Discharge to home or other facility with appropriate resources  8/9/2024 0649 by Padmaja Leo RN  Outcome: Progressing  8/8/2024 2110 by Padmaja Leo RN  Flowsheets (Taken 8/7/2024 2237)  Discharge to home or other facility with appropriate resources:   Identify barriers to discharge with patient and caregiver   Arrange for needed discharge resources and transportation as appropriate   Identify discharge learning needs (meds, wound care, etc)       The clinical goals for the shift include Patient will remain hds by end of shift 8/9/24 at 0700.

## 2024-08-09 NOTE — PROGRESS NOTES
"   Cardiology Progress Note           Rounding Cardiologist:  Dr. Denisse Payne  Primary Cardiologist:  None      Date:  8/9/2024  Patient:  Milton Lane  YOB: 1950  MRN:  [id number]   Admit Date:  8/7/2024      SUBJECTIVE    Milton Lane was seen and examined today at bedside.     Patient feels better today. Has declined placement. Wants to stay here until oxygen back on at his \"home\". No chest pain or discomfort, Has transient dizziness with standing or changing position. No palpitations.     Review of Systems   No new complaints.   VITALS     Vitals:    08/09/24 0540 08/09/24 0547 08/09/24 0800 08/09/24 1200   BP:   94/51 111/57   Patient Position:   Sitting    Pulse:   80 80   Resp:   22 20   Temp:   36.6 °C (97.9 °F)    TempSrc:   Temporal    SpO2:  (!) 50% 98% 96%   Weight: 93.5 kg (206 lb 2.1 oz)      Height:           Intake/Output Summary (Last 24 hours) at 8/9/2024 1634  Last data filed at 8/9/2024 1423  Gross per 24 hour   Intake 24 ml   Output 900 ml   Net -876 ml       Vitals:    08/09/24 0540   Weight: 93.5 kg (206 lb 2.1 oz)       CURRENT MEDICATIONS    amiodarone, 200 mg, oral, Nightly  aspirin, 81 mg, oral, Daily  atorvastatin, 20 mg, oral, Daily  budesonide, 0.5 mg, nebulization, BID  cefTRIAXone, 1 g, intravenous, q24h  empagliflozin, 12.5 mg, oral, Daily  enoxaparin, 40 mg, subcutaneous, q24h  fluticasone, 2 spray, Each Nostril, Daily  folic acid, 1 mg, oral, Daily  formoterol, 20 mcg, nebulization, BID  insulin lispro, 0-5 Units, subcutaneous, 4x daily  ipratropium-albuteroL, 3 mL, nebulization, q6h  isosorbide mononitrate ER, 30 mg, oral, Nightly  lamoTRIgine, 100 mg, oral, Nightly  metoprolol succinate XL, 25 mg, oral, Daily  multivitamin with minerals, 1 tablet, oral, Daily  nicotine, 1 patch, transdermal, q24h  polyethylene glycol, 17 g, oral, Daily  [Held by provider] spironolactone, 12.5 mg, oral, BID  thiamine, 100 mg, oral, Daily  traZODone, 50 mg, oral, " Nightly      furosemide, 5 mg/hr, Last Rate: 5 mg/hr (08/09/24 1423)      Current Outpatient Medications   Medication Instructions    albuterol (Ventolin HFA) 90 mcg/actuation inhaler 2 puffs, inhalation, Every 6 hours PRN    albuterol 2.5 mg, nebulization, Every 4 hours PRN    amiodarone (PACERONE) 200 mg, oral, Daily    aspirin 81 mg, oral, Daily    atorvastatin (LIPITOR) 20 mg, oral, Daily    bumetanide (BUMEX) 3 mg, oral, 2 times daily (morning and late afternoon)    empagliflozin (JARDIANCE) 12.5 mg, oral, Daily    fluticasone (Flonase) 50 mcg/actuation nasal spray 2 sprays, Each Nostril, Daily, Shake gently. Before first use, prime pump. After use, clean tip and replace cap.    fluticasone propion-salmeteroL (Advair Diskus) 250-50 mcg/dose diskus inhaler 1 puff, inhalation, 2 times daily RT, Rinse mouth with water after use to reduce aftertaste and incidence of candidiasis. Do not swallow.    isosorbide mononitrate ER (IMDUR) 30 mg, oral, Daily, Do not crush or chew.    lamoTRIgine (LAMICTAL) 100 mg, oral, Daily    magnesium oxide (MAG-OX) 400 mg, oral, Daily    melatonin 6 mg, oral, Nightly    menthol (Biofreeze, menthol,) 10.5 % aerosol,spray 1 spray, topical (top), 2 times daily PRN    metoprolol succinate XL (TOPROL-XL) 25 mg, oral, Daily, Do not crush or chew.    nicotine (Nicoderm CQ) 7 mg/24 hr patch 1 patch, transdermal, Every 24 hours    nicotine polacrilex (COMMIT) 2 mg, Mouth/Throat, As needed    oxygen (O2) gas therapy 1 each, inhalation, Continuous    spironolactone (ALDACTONE) 12.5 mg, oral, 2 times daily    traZODone (DESYREL) 50 mg, oral, Nightly        PHYSICAL EXAMINATION   GENERAL:  Well developed, well nourished, in no acute distress.  CHEST:  Symmetric and nontender.  NECK: mild JVD, no bruit.  LUNGS:  Non-labored respirations, diminished at the bases with bibasilar rales.   HEART:  RR, occ irreg with normal S1 and S2. No appreciable S3. II/VI Alexy/dec murmur c/w his aortic stenosis - mid  "peaking.   ABDOMEN: Soft, NT, ND without palpable organomegaly, normoactive bowel sounds.   EXTREMITIES:  Warm with good color, no clubbing or cyanosis.  There is mild edema noted.  NEURO/PSYCH:  Alert and oriented times three with approppriate behavior and responses.    LAB DATA     CBC:   Results from last 7 days   Lab Units 08/09/24  0425   WBC AUTO x10*3/uL 11.1   RBC AUTO x10*6/uL 3.61*   HEMOGLOBIN g/dL 10.8*   HEMATOCRIT % 34.4*   PLATELETS AUTO x10*3/uL 205        CMP:    Results from last 7 days   Lab Units 08/09/24  1359   SODIUM mmol/L 125*   POTASSIUM mmol/L 4.9   CHLORIDE mmol/L 84*   CO2 mmol/L 36*   BUN mg/dL 46*   CREATININE mg/dL 1.36*   GLUCOSE mg/dL 166*   CALCIUM mg/dL 8.8       Cardiac Enzymes:    Lab Results   Component Value Date    TROPHS 132 () 08/07/2024    TROPHS 86 () 08/07/2024    TROPHS 72 () 08/07/2024       Magnesium:    Lab Results   Component Value Date    MG 2.39 08/09/2024       Lipid Profile:  No results found for: \"CHLPL\", \"TRIG\", \"HDL\", \"LDLCALC\", \"LDLDIRECT\"    TSH:  No results found for: \"TSH\"    BNP:   Lab Results   Component Value Date    BNP 2,457 (H) 08/07/2024        PT/INR:    Lab Results   Component Value Date    PROTIME 14.2 (H) 08/07/2024    INR 1.3 (H) 08/07/2024       HgBA1c:    Lab Results   Component Value Date    HGBA1C 5.5 01/08/2023       CBC:  Lab Results   Component Value Date    WBC 11.1 08/09/2024    WBC 4.5 08/08/2024    WBC 7.4 08/07/2024    RBC 3.61 (L) 08/09/2024    RBC 3.67 (L) 08/08/2024    RBC 4.00 (L) 08/07/2024    HGB 10.8 (L) 08/09/2024    HGB 11.0 (L) 08/08/2024    HGB 12.0 (L) 08/07/2024    HCT 34.4 (L) 08/09/2024    HCT 33.4 (L) 08/08/2024    HCT 35.2 (L) 08/07/2024    MCV 95 08/09/2024    MCV 91 08/08/2024    MCV 88 08/07/2024    MCH 29.9 08/09/2024    MCH 30.0 08/08/2024    MCH 30.0 08/07/2024    MCHC 31.4 (L) 08/09/2024    MCHC 32.9 08/08/2024    MCHC 34.1 08/07/2024    RDW 16.0 (H) 08/09/2024    RDW 15.9 (H) 08/08/2024    RDW 15.7 " (H) 08/07/2024     08/09/2024     08/08/2024     08/07/2024       BMP:  Lab Results   Component Value Date     (L) 08/09/2024     (L) 08/09/2024     (L) 08/08/2024    K 4.9 08/09/2024    K 4.8 08/09/2024    K 4.7 08/08/2024    CL 84 (L) 08/09/2024    CL 86 (L) 08/09/2024    CL 86 (L) 08/08/2024    CO2 36 (H) 08/09/2024    CO2 28 08/09/2024    CO2 32 08/08/2024    BUN 46 (H) 08/09/2024    BUN 38 (H) 08/09/2024    BUN 35 (H) 08/08/2024    CREATININE 1.36 (H) 08/09/2024    CREATININE 1.24 08/09/2024    CREATININE 1.24 08/08/2024       Hepatic Function Panel:    Lab Results   Component Value Date    ALKPHOS 87 08/07/2024    ALT 12 08/07/2024    AST 23 08/07/2024    PROT 7.8 08/07/2024    BILITOT 1.3 (H) 08/07/2024         DIAGNOSTIC TESTING RESULTS     XR chest 1 view    Result Date: 8/7/2024  Interpreted By:  Dom Beth, STUDY: XR CHEST 1 VIEW; 8/7/2024 6:06 pm   INDICATION: Signs/Symptoms:SOB.   COMPARISON: 08/07/2024   ACCESSION NUMBER(S): [id number]   ORDERING CLINICIAN: KERI WHITMAN   TECHNIQUE: 1 view of the chest was performed.   FINDINGS: No significant interval change. Small bibasilar airspace opacities which could reflect small effusions and atelectatic change. Congestive changes and mild prominence of the pulmonary vasculature and interstitium suggest mild pulmonary edema.. No pneumothorax.  The cardiomediastinal silhouette is within normal limits.       No significant interval change suggestion of small bilateral pleural effusions, congestive changes, and probable mild pulmonary edema.   Signed by: Dom Beth 8/7/2024 6:21 PM Dictation workstation:   [id number]    ECG 12 lead    Result Date: 8/7/2024  uncertain rhythm, AF versus SR with PACs artifact affecting tracing Rightward axis Prolonged QT When compared with ECG of 18-JUN-2024 04:42, rhythm more irregular Reconfirmed by Rui Alvarado ([id number]) on 8/7/2024 4:51:35 PM    XR chest 1 view    Result  Date: 8/7/2024  STUDY: Chest Radiograph;  08/07/2024 2:23 PM INDICATION: Shortness of breath. COMPARISON: 06/14/2024 XR Chest. 01/12/2023 XR Chest. ACCESSION NUMBER(S): [id number] ORDERING CLINICIAN: ABRAHAM CAMPBELL TECHNIQUE:  Frontal chest was obtained at 14:23 hours. FINDINGS: CARDIOMEDIASTINAL SILHOUETTE: Heart size is enlarged and the mediastinal contours appear stable. There is prominence of the aortic arch.  There is elevation of the right hemidiaphragm which appears stable.   LUNGS: There are basilar opacities and bilateral pleural effusions greater on the right.  The appearance of the chest is unchanged. There is pulmonary vascular congestion without pneumothorax.  ABDOMEN: No remarkable upper abdominal findings.  BONES: No acute osseous changes.    Stable appearance of the chest with cardiomegaly and pulmonary vascular congestion/pulmonary edema. Signed by Alex Fletcher, DO    CT head wo IV contrast    Result Date: 8/7/2024  Interpreted By:  Isiah Do, STUDY: CT HEAD WO IV CONTRAST;  8/7/2024 2:54 pm   INDICATION: Signs/Symptoms:dizziness.   COMPARISON: 01/08/2023   ACCESSION NUMBER(S): [id number]   ORDERING CLINICIAN: ABRAHAM CAMPBELL   TECHNIQUE: Noncontrast axial CT scan of head was performed. Angled reformats in brain and bone windows were generated. The images were reviewed in bone, brain, blood and soft tissue windows.   FINDINGS: CSF Spaces: Moderate brain atrophy evidence by prominence of ventricles, sulci and cisterns. There is no extraaxial fluid collection.   Parenchyma: Confluent areas of subcortical and periventricular white matter changes which given patient's age are suggestive of chronic small vessel ischemic disease. Focal encephalomalacia change involving inferior lobe cortex of the right cerebellum suggestive of remote infarction. Findings similar to previous exam from 01/08/2023. The grey-white differentiation is intact. There is no mass effect or midline shift.  There is  no intracranial hemorrhage.   Calvarium: The calvarium is unremarkable.   Paranasal sinuses and mastoids: Visualized paranasal sinuses and mastoids are clear.       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 No evidence of acute cortical infarct or intracranial hemorrhage. If there is persistent clinical concern for acute cortical infarction, MRI with diffusion-weighted images is a better means for further evaluation as clinically warranted.   MACRO: None   Signed by: Isiah Do 8/7/2024 3:06 PM Dictation workstation:   [id number]         RADIOLOGY     XR chest 1 view   Final Result   No significant interval change suggestion of small bilateral pleural   effusions, congestive changes, and probable mild pulmonary edema.        Signed by: Dom Beth 8/7/2024 6:21 PM   Dictation workstation:   [id number]      CT head wo IV contrast   Final Result   Brain atrophy and findings related to remote infarction involving   right cerebellar hemisphere. No evidence of acute cortical infarct or   intracranial hemorrhage. If there is persistent clinical concern for   acute cortical infarction, MRI with diffusion-weighted images is a   better means for further evaluation as clinically warranted.        MACRO:   None        Signed by: Isiah Do 8/7/2024 3:06 PM   Dictation workstation:   [id number]      XR chest 1 view   Final Result   Stable appearance of the chest with cardiomegaly and pulmonary   vascular congestion/pulmonary edema.   Signed by Alex Fletcher DO            ASSESSMENT     Problem List Items Addressed This Visit       Hyponatremia - Primary     Other Visit Diagnoses       Acute on chronic heart failure, unspecified heart failure type (Multi)        Relevant Medications    metoprolol succinate XL (Toprol-XL) 24 hr tablet 25 mg    isosorbide mononitrate ER (Imdur) 24 hr tablet 30 mg    Urinary tract infection without hematuria, site unspecified         Acute combined systolic and diastolic congestive heart failure (Multi)        Relevant Medications    metoprolol succinate XL (Toprol-XL) 24 hr tablet 25 mg    isosorbide mononitrate ER (Imdur) 24 hr tablet 30 mg    Acute pulmonary edema (Multi)        Pulmonary hypertension (Multi)        Acute exacerbation of chronic obstructive pulmonary disease (COPD) (Multi)        Hypomagnesemia        Paroxysmal atrial fibrillation (Multi)        Relevant Medications    metoprolol succinate XL (Toprol-XL) 24 hr tablet 25 mg    isosorbide mononitrate ER (Imdur) 24 hr tablet 30 mg            Patient Active Problem List   Diagnosis    Coronary artery disease involving native coronary artery of native heart without angina pectoris    Chronic systolic heart failure (Multi)    Nicotine dependence, uncomplicated    Nonrheumatic aortic valve stenosis    Shortness of breath    Acute on chronic combined systolic (congestive) and diastolic (congestive) heart failure (Multi)    Hyponatremia    Hypervolemia    Acute on chronic hypoxic respiratory failure (Multi)    COPD (chronic obstructive pulmonary disease) (Multi)    Abnormal urinalysis       PLAN     Acute on chronic HF - current echo lists normal LV systolic function but TDS. Prior EF 35%. Continue afterload reduction and diuresis.   Hyponatremia - improved but stable.   CAD - stable without angina  Aortic stenosis - not severe. Does not require intervention at this time.     Continue current plan following electrolytes. Dr. Alvarado covering this weekend.     Please do not hesitate to call with questions.  Electronically signed by Denisse Payne MD, on 8/9/2024 at 4:34 PM

## 2024-08-09 NOTE — CARE PLAN
Problem: Skin  Goal: Participates in plan/prevention/treatment measures  Outcome: Progressing  Goal: Prevent/manage excess moisture  Outcome: Progressing  Flowsheets (Taken 8/9/2024 1034)  Prevent/manage excess moisture:   Cleanse incontinence/protect with barrier cream   Monitor for/manage infection if present  Goal: Prevent/minimize sheer/friction injuries  Outcome: Progressing     Problem: Pain  Goal: Takes deep breaths with improved pain control throughout the shift  Outcome: Progressing  Goal: Turns in bed with improved pain control throughout the shift  Outcome: Progressing     Problem: Respiratory  Goal: Minimize anxiety/maximize coping throughout shift  Outcome: Progressing     Problem: Discharge Planning  Goal: Discharge to home or other facility with appropriate resources  Outcome: Progressing   The patient's goals for the shift include      The clinical goals for the shift include Patient will remain hds by end of shift 8/9/24 at 0700.    Over the shift, the patient did not make progress toward the following goals. Barriers to progression include oxygen needs. Recommendations to address these barriers include monitor, wean as able.

## 2024-08-09 NOTE — PROGRESS NOTES
Milton Lane is a 74 y.o. male on day 2 of admission presenting with Acute on chronic hypoxic respiratory failure (Multi).    Subjective   No cp       Objective         Current Facility-Administered Medications:     acetaminophen (Tylenol) tablet 650 mg, 650 mg, oral, q4h PRN **OR** acetaminophen (Tylenol) oral liquid 650 mg, 650 mg, oral, q4h PRN **OR** acetaminophen (Tylenol) suppository 650 mg, 650 mg, rectal, q4h PRN, Stacey Hernandez APRN-CNP    albuterol 2.5 mg /3 mL (0.083 %) nebulizer solution 2.5 mg, 2.5 mg, nebulization, q4h PRN, DAVID Holden-CNP, 2.5 mg at 08/09/24 0046    amiodarone (Pacerone) tablet 200 mg, 200 mg, oral, Nightly, Angela Grover MD, 200 mg at 08/08/24 2053    aspirin EC tablet 81 mg, 81 mg, oral, Daily, Stacey Hernandez APRN-CNP, 81 mg at 08/09/24 0939    atorvastatin (Lipitor) tablet 20 mg, 20 mg, oral, Daily, Stacey Hernandez APRN-CNP, 20 mg at 08/09/24 0939    budesonide (Pulmicort) 0.5 mg/2 mL nebulizer solution 0.5 mg, 0.5 mg, nebulization, BID, DAVID Holden-CNP, 0.5 mg at 08/09/24 0851    cefTRIAXone (Rocephin) 1 g in dextrose (iso) IV 50 mL, 1 g, intravenous, q24h, DAVID Holden-CNP, Stopped at 08/08/24 1435    dextrose 50 % injection 12.5 g, 12.5 g, intravenous, q15 min PRN, Stacey Hernandez APRN-CNP    dextrose 50 % injection 25 g, 25 g, intravenous, q15 min PRN, Stacey Hernandez APRN-CNP    empagliflozin (Jardiance) tablet 12.5 mg, 12.5 mg, oral, Daily, Stacey Hernandez APRQUINCY-CNP, 12.5 mg at 08/09/24 0939    enoxaparin (Lovenox) syringe 40 mg, 40 mg, subcutaneous, q24h, DEBBIE Holden, 40 mg at 08/08/24 2053    fluticasone (Flonase) nasal spray 2 spray, 2 spray, Each Nostril, Daily, DEBBIE Holden    folic acid (Folvite) tablet 1 mg, 1 mg, oral, Daily, DEBBIE Holden, 1 mg at 08/09/24 0939    formoterol (Perforomist) 20 mcg/2 mL nebulizer solution 20 mcg, 20 mcg, nebulization, BID, DEBBIE Holden, 20 mcg  at 08/09/24 0850    furosemide (Lasix) 500 mg in 50 mL (10 mg/mL) infusion, 5 mg/hr, intravenous, Continuous, Sofiya Greenwood MD, Last Rate: 0.5 mL/hr at 08/09/24 1039, 5 mg/hr at 08/09/24 1039    glucagon (Glucagen) injection 1 mg, 1 mg, intramuscular, q15 min PRN, DEBBIE Holden    glucagon (Glucagen) injection 1 mg, 1 mg, intramuscular, q15 min PRN, DAVID Holden-CNP    insulin lispro (HumaLOG) injection 0-5 Units, 0-5 Units, subcutaneous, 4x daily, DEBBIE Holden, 1 Units at 08/08/24 2053    ipratropium-albuteroL (Duo-Neb) 0.5-2.5 mg/3 mL nebulizer solution 3 mL, 3 mL, nebulization, q6h, DEBBIE Holden, 3 mL at 08/09/24 0851    isosorbide mononitrate ER (Imdur) 24 hr tablet 30 mg, 30 mg, oral, Nightly, Angela Grover MD, 30 mg at 08/08/24 2053    lamoTRIgine (LaMICtal) tablet 100 mg, 100 mg, oral, Nightly, Angela Grover MD, 100 mg at 08/08/24 2053    LORazepam (Ativan) injection 0.5 mg, 0.5 mg, intravenous, q2h PRN **OR** LORazepam (Ativan) injection 1 mg, 1 mg, intravenous, q2h PRN **OR** LORazepam (Ativan) injection 2 mg, 2 mg, intravenous, q2h PRN, DEBBIE Holden    melatonin tablet 3 mg, 3 mg, oral, Nightly PRN, DEBBIE Holden    metoprolol succinate XL (Toprol-XL) 24 hr tablet 25 mg, 25 mg, oral, Daily, DEBBIE Holden, 25 mg at 08/09/24 0939    multivitamin with minerals 1 tablet, 1 tablet, oral, Daily, DEBBIE Holden, 1 tablet at 08/09/24 0939    nicotine (Nicoderm CQ) 7 mg/24 hr patch 1 patch, 1 patch, transdermal, q24h, DEBBIE Holden, 1 patch at 08/09/24 0940    oxygen (O2) therapy, , inhalation, Continuous PRN - O2/gases, Rishi Anthony MD, 8 L/min at 08/09/24 0850    oxygen (O2) therapy, , inhalation, Continuous PRN - O2/gases, Rishi Anthony MD    polyethylene glycol (Glycolax, Miralax) packet 17 g, 17 g, oral, Daily, DEBBIE Holden    [Held by provider] spironolactone  "(Aldactone) tablet 12.5 mg, 12.5 mg, oral, BID, DEBBIE Holden    [START ON 8/10/2024] thiamine (Vitamin B-1) tablet 100 mg, 100 mg, oral, Daily, DEBBIE Holden    thiamine (Vitamin B1) injection 100 mg, 100 mg, intravenous, Daily, DEBBIE Holden, 100 mg at 08/08/24 1140    traZODone (Desyrel) tablet 50 mg, 50 mg, oral, Nightly, DEBBIE Holden       Physical Exam  HENT:      Head: Normocephalic.   Eyes:      Conjunctiva/sclera: Conjunctivae normal.   Cardiovascular:      Rate and Rhythm: Regular rhythm.   Pulmonary:      Breath sounds: Normal breath sounds.   Abdominal:      General: Bowel sounds are normal.      Palpations: Abdomen is soft.   Musculoskeletal:         General: Normal range of motion.   Skin:     General: Skin is warm and dry.   Neurological:      General: No focal deficit present.      Mental Status: He is alert.   Psychiatric:         Behavior: Behavior normal.           Last Recorded Vitals  Blood pressure 94/51, pulse 80, temperature 36.6 °C (97.9 °F), temperature source Temporal, resp. rate 22, height 1.753 m (5' 9.02\"), weight 93.5 kg (206 lb 2.1 oz), SpO2 98%.  Intake/Output last 3 Shifts:  I/O last 3 completed shifts:  In: 1154.6 (12.3 mL/kg) [P.O.:840; I.V.:64.6 (0.7 mL/kg); IV Piggyback:250]  Out: 3450 (36.9 mL/kg) [Urine:3450 (1 mL/kg/hr)]  Weight: 93.5 kg     Labs:       Results for orders placed or performed during the hospital encounter of 08/07/24 (from the past 24 hour(s))   POCT GLUCOSE   Result Value Ref Range    POCT Glucose 145 (H) 74 - 99 mg/dL   Basic Metabolic Panel   Result Value Ref Range    Glucose 202 (H) 74 - 99 mg/dL    Sodium 127 (L) 136 - 145 mmol/L    Potassium 4.7 3.5 - 5.3 mmol/L    Chloride 87 (L) 98 - 107 mmol/L    Bicarbonate 28 21 - 32 mmol/L    Anion Gap 17 10 - 20 mmol/L    Urea Nitrogen 31 (H) 6 - 23 mg/dL    Creatinine 1.07 0.50 - 1.30 mg/dL    eGFR 72 >60 mL/min/1.73m*2    Calcium 8.9 8.6 - 10.3 mg/dL   POCT " GLUCOSE   Result Value Ref Range    POCT Glucose 239 (H) 74 - 99 mg/dL   Basic Metabolic Panel   Result Value Ref Range    Glucose 176 (H) 74 - 99 mg/dL    Sodium 126 (L) 136 - 145 mmol/L    Potassium 4.7 3.5 - 5.3 mmol/L    Chloride 86 (L) 98 - 107 mmol/L    Bicarbonate 32 21 - 32 mmol/L    Anion Gap 13 10 - 20 mmol/L    Urea Nitrogen 35 (H) 6 - 23 mg/dL    Creatinine 1.24 0.50 - 1.30 mg/dL    eGFR 60 (L) >60 mL/min/1.73m*2    Calcium 8.9 8.6 - 10.3 mg/dL   POCT GLUCOSE   Result Value Ref Range    POCT Glucose 197 (H) 74 - 99 mg/dL   CBC   Result Value Ref Range    WBC 11.1 4.4 - 11.3 x10*3/uL    nRBC 0.0 0.0 - 0.0 /100 WBCs    RBC 3.61 (L) 4.50 - 5.90 x10*6/uL    Hemoglobin 10.8 (L) 13.5 - 17.5 g/dL    Hematocrit 34.4 (L) 41.0 - 52.0 %    MCV 95 80 - 100 fL    MCH 29.9 26.0 - 34.0 pg    MCHC 31.4 (L) 32.0 - 36.0 g/dL    RDW 16.0 (H) 11.5 - 14.5 %    Platelets 205 150 - 450 x10*3/uL   Basic metabolic panel   Result Value Ref Range    Glucose 132 (H) 74 - 99 mg/dL    Sodium 123 (L) 136 - 145 mmol/L    Potassium 4.8 3.5 - 5.3 mmol/L    Chloride 86 (L) 98 - 107 mmol/L    Bicarbonate 28 21 - 32 mmol/L    Anion Gap 14 10 - 20 mmol/L    Urea Nitrogen 38 (H) 6 - 23 mg/dL    Creatinine 1.24 0.50 - 1.30 mg/dL    eGFR 60 (L) >60 mL/min/1.73m*2    Calcium 8.5 (L) 8.6 - 10.3 mg/dL   Magnesium   Result Value Ref Range    Magnesium 2.39 1.60 - 2.40 mg/dL   POCT GLUCOSE   Result Value Ref Range    POCT Glucose 149 (H) 74 - 99 mg/dL              Assessment/Plan   Principal Problem:    Acute on chronic hypoxic respiratory failure (Multi)  Active Problems:    Acute on chronic combined systolic (congestive) and diastolic (congestive) heart failure (Multi)    Hyponatremia    Hypervolemia    COPD (chronic obstructive pulmonary disease) (Multi)    Abnormal urinalysis  PAF      PLAN: Pt is slowly improving, optimize CHF medicine, med list and labs reviewed, for now c/w current Rx, c/w supplemental oxygen, titrate FiO2 keep pulse ox above  90%, discussed with nursing, patient may need ECF placement, follow closely.          Rishi Anthony MD

## 2024-08-09 NOTE — PROGRESS NOTES
Physical Therapy                 Therapy Communication Note    Patient Name: Milton Lane  MRN: [id number]  Today's Date: 8/9/2024     Discipline: Physical Therapy    Missed Visit Reason: Missed Visit Reason: Other (Comment)    Missed Time: Attempt    Comment: 3 attempts made to see pt this date. Pt eating breakfast at first attempt. Pt sleeping during second 2 attempts. Attempted to arouse pt, however, pt is unable to keep his eyes open and participate at this time. Will continue to see per POC.

## 2024-08-10 LAB
ANION GAP SERPL CALC-SCNC: 9 MMOL/L (ref 10–20)
ANION GAP SERPL CALC-SCNC: 9 MMOL/L (ref 10–20)
BACTERIA UR CULT: ABNORMAL
BUN SERPL-MCNC: 49 MG/DL (ref 6–23)
BUN SERPL-MCNC: 50 MG/DL (ref 6–23)
CALCIUM SERPL-MCNC: 8.6 MG/DL (ref 8.6–10.3)
CALCIUM SERPL-MCNC: 8.7 MG/DL (ref 8.6–10.3)
CHLORIDE SERPL-SCNC: 85 MMOL/L (ref 98–107)
CHLORIDE SERPL-SCNC: 85 MMOL/L (ref 98–107)
CO2 SERPL-SCNC: 37 MMOL/L (ref 21–32)
CO2 SERPL-SCNC: 37 MMOL/L (ref 21–32)
CREAT SERPL-MCNC: 1.46 MG/DL (ref 0.5–1.3)
CREAT SERPL-MCNC: 1.53 MG/DL (ref 0.5–1.3)
EGFRCR SERPLBLD CKD-EPI 2021: 47 ML/MIN/1.73M*2
EGFRCR SERPLBLD CKD-EPI 2021: 50 ML/MIN/1.73M*2
ERYTHROCYTE [DISTWIDTH] IN BLOOD BY AUTOMATED COUNT: 15.9 % (ref 11.5–14.5)
FERRITIN SERPL-MCNC: 273 NG/ML (ref 20–300)
FOLATE SERPL-MCNC: 18.4 NG/ML
GLUCOSE BLD MANUAL STRIP-MCNC: 107 MG/DL (ref 74–99)
GLUCOSE BLD MANUAL STRIP-MCNC: 129 MG/DL (ref 74–99)
GLUCOSE BLD MANUAL STRIP-MCNC: 137 MG/DL (ref 74–99)
GLUCOSE SERPL-MCNC: 106 MG/DL (ref 74–99)
GLUCOSE SERPL-MCNC: 129 MG/DL (ref 74–99)
HCT VFR BLD AUTO: 35 % (ref 41–52)
HGB BLD-MCNC: 11 G/DL (ref 13.5–17.5)
HOLD SPECIMEN: NORMAL
IRON SATN MFR SERPL: 9 % (ref 25–45)
IRON SERPL-MCNC: 31 UG/DL (ref 35–150)
MAGNESIUM SERPL-MCNC: 2.07 MG/DL (ref 1.6–2.4)
MCH RBC QN AUTO: 30.6 PG (ref 26–34)
MCHC RBC AUTO-ENTMCNC: 31.4 G/DL (ref 32–36)
MCV RBC AUTO: 98 FL (ref 80–100)
NRBC BLD-RTO: 0 /100 WBCS (ref 0–0)
PLATELET # BLD AUTO: 204 X10*3/UL (ref 150–450)
POTASSIUM SERPL-SCNC: 4.8 MMOL/L (ref 3.5–5.3)
POTASSIUM SERPL-SCNC: 5.2 MMOL/L (ref 3.5–5.3)
RBC # BLD AUTO: 3.59 X10*6/UL (ref 4.5–5.9)
SODIUM SERPL-SCNC: 126 MMOL/L (ref 136–145)
SODIUM SERPL-SCNC: 126 MMOL/L (ref 136–145)
TIBC SERPL-MCNC: 343 UG/DL (ref 240–445)
UIBC SERPL-MCNC: 312 UG/DL (ref 110–370)
VIT B12 SERPL-MCNC: 328 PG/ML (ref 211–911)
WBC # BLD AUTO: 11.3 X10*3/UL (ref 4.4–11.3)

## 2024-08-10 PROCEDURE — 82746 ASSAY OF FOLIC ACID SERUM: CPT | Mod: STJLAB | Performed by: NURSE PRACTITIONER

## 2024-08-10 PROCEDURE — 2500000001 HC RX 250 WO HCPCS SELF ADMINISTERED DRUGS (ALT 637 FOR MEDICARE OP): Performed by: NURSE PRACTITIONER

## 2024-08-10 PROCEDURE — 2060000001 HC INTERMEDIATE ICU ROOM DAILY

## 2024-08-10 PROCEDURE — 83540 ASSAY OF IRON: CPT | Performed by: NURSE PRACTITIONER

## 2024-08-10 PROCEDURE — 83735 ASSAY OF MAGNESIUM: CPT | Performed by: NURSE PRACTITIONER

## 2024-08-10 PROCEDURE — 2500000001 HC RX 250 WO HCPCS SELF ADMINISTERED DRUGS (ALT 637 FOR MEDICARE OP)

## 2024-08-10 PROCEDURE — 2500000005 HC RX 250 GENERAL PHARMACY W/O HCPCS: Performed by: INTERNAL MEDICINE

## 2024-08-10 PROCEDURE — 94640 AIRWAY INHALATION TREATMENT: CPT

## 2024-08-10 PROCEDURE — S4991 NICOTINE PATCH NONLEGEND: HCPCS | Performed by: NURSE PRACTITIONER

## 2024-08-10 PROCEDURE — 2500000004 HC RX 250 GENERAL PHARMACY W/ HCPCS (ALT 636 FOR OP/ED): Performed by: NURSE PRACTITIONER

## 2024-08-10 PROCEDURE — 82947 ASSAY GLUCOSE BLOOD QUANT: CPT

## 2024-08-10 PROCEDURE — 80048 BASIC METABOLIC PNL TOTAL CA: CPT | Performed by: NURSE PRACTITIONER

## 2024-08-10 PROCEDURE — 82607 VITAMIN B-12: CPT | Mod: STJLAB | Performed by: NURSE PRACTITIONER

## 2024-08-10 PROCEDURE — 82728 ASSAY OF FERRITIN: CPT | Performed by: NURSE PRACTITIONER

## 2024-08-10 PROCEDURE — 2500000002 HC RX 250 W HCPCS SELF ADMINISTERED DRUGS (ALT 637 FOR MEDICARE OP, ALT 636 FOR OP/ED): Performed by: NURSE PRACTITIONER

## 2024-08-10 PROCEDURE — 36415 COLL VENOUS BLD VENIPUNCTURE: CPT | Performed by: NURSE PRACTITIONER

## 2024-08-10 PROCEDURE — 85027 COMPLETE CBC AUTOMATED: CPT | Performed by: NURSE PRACTITIONER

## 2024-08-10 PROCEDURE — 2500000002 HC RX 250 W HCPCS SELF ADMINISTERED DRUGS (ALT 637 FOR MEDICARE OP, ALT 636 FOR OP/ED)

## 2024-08-10 PROCEDURE — 2500000005 HC RX 250 GENERAL PHARMACY W/O HCPCS: Performed by: NURSE PRACTITIONER

## 2024-08-10 RX ORDER — IPRATROPIUM BROMIDE AND ALBUTEROL SULFATE 2.5; .5 MG/3ML; MG/3ML
3 SOLUTION RESPIRATORY (INHALATION) 4 TIMES DAILY
Status: DISPENSED | OUTPATIENT
Start: 2024-08-11

## 2024-08-10 ASSESSMENT — LIFESTYLE VARIABLES
NAUSEA AND VOMITING: NO NAUSEA AND NO VOMITING
AUDITORY DISTURBANCES: NOT PRESENT
VISUAL DISTURBANCES: NOT PRESENT
NAUSEA AND VOMITING: NO NAUSEA AND NO VOMITING
TOTAL SCORE: 1
ANXIETY: NO ANXIETY, AT EASE
AGITATION: MODERATELY FIDGETY AND RESTLESS
ORIENTATION AND CLOUDING OF SENSORIUM: CANNOT DO SERIAL ADDITIONS OR IS UNCERTAIN ABOUT DATE
HEADACHE, FULLNESS IN HEAD: NOT PRESENT
PAROXYSMAL SWEATS: NO SWEAT VISIBLE
HEADACHE, FULLNESS IN HEAD: NOT PRESENT
ORIENTATION AND CLOUDING OF SENSORIUM: CANNOT DO SERIAL ADDITIONS OR IS UNCERTAIN ABOUT DATE
VISUAL DISTURBANCES: NOT PRESENT
HEADACHE, FULLNESS IN HEAD: NOT PRESENT
AGITATION: NORMAL ACTIVITY
AGITATION: NORMAL ACTIVITY
ANXIETY: NO ANXIETY, AT EASE
TOTAL SCORE: 1
TREMOR: NO TREMOR
TREMOR: NO TREMOR
VISUAL DISTURBANCES: NOT PRESENT
BLOOD PRESSURE: 150/61
ORIENTATION AND CLOUDING OF SENSORIUM: CANNOT DO SERIAL ADDITIONS OR IS UNCERTAIN ABOUT DATE
TOTAL SCORE: 1
TREMOR: NO TREMOR
NAUSEA AND VOMITING: NO NAUSEA AND NO VOMITING
PAROXYSMAL SWEATS: NO SWEAT VISIBLE
TREMOR: NO TREMOR
AUDITORY DISTURBANCES: NOT PRESENT
HEADACHE, FULLNESS IN HEAD: NOT PRESENT
PULSE: 58
ANXIETY: NO ANXIETY, AT EASE
TOTAL SCORE: 5
PAROXYSMAL SWEATS: NO SWEAT VISIBLE
PAROXYSMAL SWEATS: NO SWEAT VISIBLE
VISUAL DISTURBANCES: NOT PRESENT
AUDITORY DISTURBANCES: NOT PRESENT
ANXIETY: NO ANXIETY, AT EASE
AUDITORY DISTURBANCES: NOT PRESENT
ORIENTATION AND CLOUDING OF SENSORIUM: CANNOT DO SERIAL ADDITIONS OR IS UNCERTAIN ABOUT DATE
NAUSEA AND VOMITING: NO NAUSEA AND NO VOMITING
AGITATION: NORMAL ACTIVITY

## 2024-08-10 ASSESSMENT — COGNITIVE AND FUNCTIONAL STATUS - GENERAL
MOVING TO AND FROM BED TO CHAIR: A LITTLE
WALKING IN HOSPITAL ROOM: A LITTLE
PERSONAL GROOMING: A LOT
CLIMB 3 TO 5 STEPS WITH RAILING: TOTAL
HELP NEEDED FOR BATHING: A LOT
HELP NEEDED FOR BATHING: A LOT
TOILETING: A LOT
MOBILITY SCORE: 16
MOVING FROM LYING ON BACK TO SITTING ON SIDE OF FLAT BED WITH BEDRAILS: A LITTLE
TURNING FROM BACK TO SIDE WHILE IN FLAT BAD: A LITTLE
MOBILITY SCORE: 16
CLIMB 3 TO 5 STEPS WITH RAILING: TOTAL
DRESSING REGULAR LOWER BODY CLOTHING: A LOT
STANDING UP FROM CHAIR USING ARMS: A LITTLE
DRESSING REGULAR LOWER BODY CLOTHING: A LOT
MOVING TO AND FROM BED TO CHAIR: A LITTLE
WALKING IN HOSPITAL ROOM: A LITTLE
STANDING UP FROM CHAIR USING ARMS: A LITTLE
DAILY ACTIVITIY SCORE: 16
DRESSING REGULAR UPPER BODY CLOTHING: A LOT
DRESSING REGULAR UPPER BODY CLOTHING: A LITTLE
MOVING FROM LYING ON BACK TO SITTING ON SIDE OF FLAT BED WITH BEDRAILS: A LITTLE
PERSONAL GROOMING: A LITTLE
TURNING FROM BACK TO SIDE WHILE IN FLAT BAD: A LITTLE
TOILETING: A LOT
DAILY ACTIVITIY SCORE: 14

## 2024-08-10 ASSESSMENT — PAIN - FUNCTIONAL ASSESSMENT
PAIN_FUNCTIONAL_ASSESSMENT: 0-10

## 2024-08-10 ASSESSMENT — PAIN SCALES - GENERAL
PAINLEVEL_OUTOF10: 0 - NO PAIN

## 2024-08-10 NOTE — PROGRESS NOTES
Milton Lane is a 74 y.o. male on day 3 of admission presenting with Acute on chronic hypoxic respiratory failure (Multi).    Subjective   No cp       Objective         Current Facility-Administered Medications:     acetaminophen (Tylenol) tablet 650 mg, 650 mg, oral, q4h PRN **OR** acetaminophen (Tylenol) oral liquid 650 mg, 650 mg, oral, q4h PRN **OR** acetaminophen (Tylenol) suppository 650 mg, 650 mg, rectal, q4h PRN, DEBBIE Holden    albuterol 2.5 mg /3 mL (0.083 %) nebulizer solution 2.5 mg, 2.5 mg, nebulization, q4h PRN, DEBBIE Holden, 2.5 mg at 08/09/24 0046    amiodarone (Pacerone) tablet 200 mg, 200 mg, oral, Nightly, Angela Grover MD, 200 mg at 08/09/24 2030    aspirin EC tablet 81 mg, 81 mg, oral, Daily, DEBBIE Holden, 81 mg at 08/10/24 0921    atorvastatin (Lipitor) tablet 20 mg, 20 mg, oral, Daily, DAVID Holden-CNP, 20 mg at 08/10/24 0922    budesonide (Pulmicort) 0.5 mg/2 mL nebulizer solution 0.5 mg, 0.5 mg, nebulization, BID, DAVID Holden-CNP, 0.5 mg at 08/10/24 0859    bumetanide (Bumex) tablet 3 mg, 3 mg, oral, BID, Sofiya Greenwood MD    cefTRIAXone (Rocephin) 1 g in dextrose (iso) IV 50 mL, 1 g, intravenous, q24h, DEBBIE Holden, Stopped at 08/10/24 1422    dextrose 50 % injection 12.5 g, 12.5 g, intravenous, q15 min PRN, DEBBIE Holden    dextrose 50 % injection 25 g, 25 g, intravenous, q15 min PRN, DEBBIE Holden    empagliflozin (Jardiance) tablet 12.5 mg, 12.5 mg, oral, Daily, DAVID Holden-CNP, 12.5 mg at 08/10/24 0921    enoxaparin (Lovenox) syringe 40 mg, 40 mg, subcutaneous, q24h, DAVID Holden-CNP, 40 mg at 08/09/24 2027    fluticasone (Flonase) nasal spray 2 spray, 2 spray, Each Nostril, Daily, DEBBIE Holden    folic acid (Folvite) tablet 1 mg, 1 mg, oral, Daily, DAVID Holden-CNP, 1 mg at 08/10/24 0921    formoterol (Perforomist) 20 mcg/2 mL nebulizer  solution 20 mcg, 20 mcg, nebulization, BID, DAVID Holden-CNP, 20 mcg at 08/10/24 0859    furosemide (Lasix) 500 mg in 50 mL (10 mg/mL) infusion, 5 mg/hr, intravenous, Continuous, Sofiya Greenwood MD, Last Rate: 0.5 mL/hr at 08/10/24 1638, 5 mg/hr at 08/10/24 1638    glucagon (Glucagen) injection 1 mg, 1 mg, intramuscular, q15 min PRN, Stacey Hernandez APRN-CNP    glucagon (Glucagen) injection 1 mg, 1 mg, intramuscular, q15 min PRN, DAVID Holden-CNP    insulin lispro (HumaLOG) injection 0-5 Units, 0-5 Units, subcutaneous, 4x daily, DAVID Holden-CNP, 1 Units at 08/08/24 2053    ipratropium-albuteroL (Duo-Neb) 0.5-2.5 mg/3 mL nebulizer solution 3 mL, 3 mL, nebulization, q6h, DAVID Holden-CNP, 3 mL at 08/10/24 0859    isosorbide mononitrate ER (Imdur) 24 hr tablet 30 mg, 30 mg, oral, Nightly, DEBBIE Pompa, 30 mg at 08/09/24 2212    lamoTRIgine (LaMICtal) tablet 100 mg, 100 mg, oral, Nightly, Angela Grover MD, 100 mg at 08/09/24 2028    lidocaine 4 % patch 1 patch, 1 patch, transdermal, Daily, DEBBIE Pompa, 1 patch at 08/10/24 0922    LORazepam (Ativan) injection 0.5 mg, 0.5 mg, intravenous, q2h PRN **OR** LORazepam (Ativan) injection 1 mg, 1 mg, intravenous, q2h PRN **OR** LORazepam (Ativan) injection 2 mg, 2 mg, intravenous, q2h PRN, DAVID Holden-CNP    melatonin tablet 3 mg, 3 mg, oral, Nightly PRN, DAVID Holden-CNP    metoprolol succinate XL (Toprol-XL) 24 hr tablet 25 mg, 25 mg, oral, Daily, Rafaela Khan, APRN-CNP, 25 mg at 08/10/24 0921    multivitamin with minerals 1 tablet, 1 tablet, oral, Daily, DEBBIE Holden, 1 tablet at 08/10/24 0921    nicotine (Nicoderm CQ) 7 mg/24 hr patch 1 patch, 1 patch, transdermal, q24h, DEBBIE Holden, 1 patch at 08/10/24 1026    oxygen (O2) therapy, , inhalation, Continuous PRN - O2/gases, Rishi Anthony MD    oxygen (O2) therapy, , inhalation, Continuous PRN - O2/gases,  "Rishi Anthony MD, 5 L/min at 08/10/24 0900    polyethylene glycol (Glycolax, Miralax) packet 17 g, 17 g, oral, Daily, DEBBIE Holden    [Held by provider] spironolactone (Aldactone) tablet 12.5 mg, 12.5 mg, oral, BID, DAVID Holden-CNP    thiamine (Vitamin B-1) tablet 100 mg, 100 mg, oral, Daily, Rafaela KhanDAVID-CNP, 100 mg at 08/10/24 0921    traZODone (Desyrel) tablet 50 mg, 50 mg, oral, Nightly, DEBBIE Holden, 50 mg at 08/09/24 2028       Physical Exam  HENT:      Head: Normocephalic.      Mouth/Throat:      Pharynx: Oropharynx is clear.   Eyes:      Conjunctiva/sclera: Conjunctivae normal.   Cardiovascular:      Rate and Rhythm: Regular rhythm.   Pulmonary:      Breath sounds: Normal breath sounds.   Abdominal:      General: Bowel sounds are normal.      Palpations: Abdomen is soft.   Musculoskeletal:         General: Normal range of motion.   Skin:     General: Skin is warm and dry.   Neurological:      General: No focal deficit present.      Mental Status: He is alert.   Psychiatric:         Behavior: Behavior normal.           Last Recorded Vitals  Blood pressure 150/61, pulse 76, temperature 36 °C (96.8 °F), temperature source Temporal, resp. rate (!) 30, height 1.753 m (5' 9.02\"), weight 94 kg (207 lb 3.7 oz), SpO2 96%.  Intake/Output last 3 Shifts:  I/O last 3 completed shifts:  In: 18.6 (0.2 mL/kg) [I.V.:18.6 (0.2 mL/kg)]  Out: 1850 (19.7 mL/kg) [Urine:1850 (0.5 mL/kg/hr)]  Weight: 94 kg     Labs:       Results for orders placed or performed during the hospital encounter of 08/07/24 (from the past 24 hour(s))   POCT GLUCOSE   Result Value Ref Range    POCT Glucose 136 (H) 74 - 99 mg/dL   Basic Metabolic Panel   Result Value Ref Range    Glucose 129 (H) 74 - 99 mg/dL    Sodium 126 (L) 136 - 145 mmol/L    Potassium 4.8 3.5 - 5.3 mmol/L    Chloride 85 (L) 98 - 107 mmol/L    Bicarbonate 37 (H) 21 - 32 mmol/L    Anion Gap 9 (L) 10 - 20 mmol/L    Urea Nitrogen 49 (H) 6 " - 23 mg/dL    Creatinine 1.46 (H) 0.50 - 1.30 mg/dL    eGFR 50 (L) >60 mL/min/1.73m*2    Calcium 8.7 8.6 - 10.3 mg/dL   Lavender Top   Result Value Ref Range    Extra Tube Hold for add-ons.    CBC   Result Value Ref Range    WBC 11.3 4.4 - 11.3 x10*3/uL    nRBC 0.0 0.0 - 0.0 /100 WBCs    RBC 3.59 (L) 4.50 - 5.90 x10*6/uL    Hemoglobin 11.0 (L) 13.5 - 17.5 g/dL    Hematocrit 35.0 (L) 41.0 - 52.0 %    MCV 98 80 - 100 fL    MCH 30.6 26.0 - 34.0 pg    MCHC 31.4 (L) 32.0 - 36.0 g/dL    RDW 15.9 (H) 11.5 - 14.5 %    Platelets 204 150 - 450 x10*3/uL   Magnesium   Result Value Ref Range    Magnesium 2.07 1.60 - 2.40 mg/dL   Ferritin   Result Value Ref Range    Ferritin 273 20 - 300 ng/mL   Folate   Result Value Ref Range    Folate, Serum 18.4 >5.0 ng/mL   Iron and TIBC   Result Value Ref Range    Iron 31 (L) 35 - 150 ug/dL    UIBC 312 110 - 370 ug/dL    TIBC 343 240 - 445 ug/dL    % Saturation 9 (L) 25 - 45 %   Vitamin B12   Result Value Ref Range    Vitamin B12 328 211 - 911 pg/mL   Basic Metabolic Panel   Result Value Ref Range    Glucose 106 (H) 74 - 99 mg/dL    Sodium 126 (L) 136 - 145 mmol/L    Potassium 5.2 3.5 - 5.3 mmol/L    Chloride 85 (L) 98 - 107 mmol/L    Bicarbonate 37 (H) 21 - 32 mmol/L    Anion Gap 9 (L) 10 - 20 mmol/L    Urea Nitrogen 50 (H) 6 - 23 mg/dL    Creatinine 1.53 (H) 0.50 - 1.30 mg/dL    eGFR 47 (L) >60 mL/min/1.73m*2    Calcium 8.6 8.6 - 10.3 mg/dL   POCT GLUCOSE   Result Value Ref Range    POCT Glucose 107 (H) 74 - 99 mg/dL   POCT GLUCOSE   Result Value Ref Range    POCT Glucose 129 (H) 74 - 99 mg/dL              Assessment/Plan   Principal Problem:    Acute on chronic hypoxic respiratory failure (Multi)  Active Problems:    Acute on chronic combined systolic (congestive) and diastolic (congestive) heart failure (Multi)    Hyponatremia    Hypervolemia    COPD (chronic obstructive pulmonary disease) (Multi)    Abnormal urinalysis        PLAN: Pt is slowly improving, med list and labs reviewed, for  now c/w current Rx, pt may need placement, follow closely.         Rishi Anthony MD

## 2024-08-10 NOTE — CARE PLAN
Problem: Skin  Goal: Prevent/minimize sheer/friction injuries  8/10/2024 1706 by Lesly Mcguire RN  Outcome: Progressing  Flowsheets (Taken 8/10/2024 1706)  Prevent/minimize sheer/friction injuries: Increase activity/out of bed for meals  8/10/2024 1706 by Lesly Mcguire, RN  Reactivated  Flowsheets (Taken 8/10/2024 1706)  Prevent/minimize sheer/friction injuries: Increase activity/out of bed for meals     Problem: Pain  Goal: Turns in bed with improved pain control throughout the shift  Reactivated     Problem: Respiratory  Goal: Minimize anxiety/maximize coping throughout shift  Flowsheets (Taken 8/10/2024 1708)  Minimize anxiety/maximize coping throughout shift: Monitor pain/anxiety level   The patient's goals for the shift include      The clinical goals for the shift include Pt will remain HDS throughout shift on 8/10/24.    Patient continues to take oxymizer off at times, reminded to keep oxygen on. CIWA 1, patient irritable at times especially with receiving care. Lasix gtt continued with good urine output.

## 2024-08-10 NOTE — DOCUMENTATION CLARIFICATION NOTE
"    PATIENT:               JACINTA GIMENEZ  ACCT #:                  [id number]  MRN:                       [id number]  :                       1950  ADMIT DATE:       2024 12:48 PM  DISCH DATE:  RESPONDING PROVIDER #:        [id number]          PROVIDER RESPONSE TEXT:    Type II MI    CDI QUERY TEXT:    Clarification    Instruction:    Based on your assessment of the patient and the clinical information, please provide the requested documentation by clicking on the appropriate radio button and enter any additional information if prompted.    Question: Is there a diagnosis indicative of the patients elevated Troponins and clinical findings    When answering this query, please exercise your independent professional judgment. The fact that a question is being asked, does not imply that any particular answer is desired or expected.    The patient's clinical indicators include:  Clinical Information:  74 yr old male presenting from home due to dizziness, leg weakness, and SOB.  Admitted for Acute on Chronic Systolic/Diastolic Heart Failure Exacerbation, Acute on Chronic Hypoxic Respiratory Failure, UTI, COPD Exacerbation, and Hyponatremia.    Clinical Indicators:   on .    Troponin: 72, 86, 132 on .    EKG  \"uncertain rhythm, AF versus SR with PACs\", \"artifact affecting tracing\", \"Rightward axis\", \"Prolonged QT\" and when compare to EKG 24  \"rhythm more irregular\".    HP by IM  1003 AM states \"workup in the ER showed acute on chronic respiratory failure requiring BiPAP and severe hyponatremia with sodium being 115.  He was admitted in stepdown unit for further care.\"  Also stating \"Acute on chronic combined systolic congestive and diastolic congestive heart failure.\"    Treatment: Troponin trend x3.  BNP.  EKG.  VBG.  Amiodarone, Aspirin, Lipitor, Imdur, Metoprolol, Jardiance, Lovenox.  Pulmicort, Perforomist, Duoneb nebulizer.  Lasix Gtt -.  Solumedrol  mg on .  Solumedrol IV 40 " mg on 8/7, 8/8.  Supplemental O2 above baseline.  Cardiology consult.    Risk Factors: Elderly male w/hx of COPD/Chronic O2 Dependence, CHF, HTN, Paroxysmal AFib, CAD, Pulmonary HTN, HLD, CECE.  Options provided:  -- Type II MI  -- Elevated troponin, clinically insignificant  -- Other - I will add my own diagnosis  -- Refer to Clinical Documentation Reviewer    Query created by: Cecy Chin on 8/9/2024 10:31 AM      Electronically signed by:  CHRISTIANO GUERRERO MD 8/10/2024 12:21 AM

## 2024-08-10 NOTE — CARE PLAN
Pt remained A fib on tele with HR between 65-90 bpm. Pt SBP mid 90' to 120, pt denied dizziness and lightheadedness. Pt lasix gtt continued at 0.5 ml/hr, pt had 950 ml urine output this RN shift. Pt was medicated per order, pulse ox remained >92% on 7 L oximizer. Pt refused to wear Bipap this RN shift, pt was educated on importance of Bipap however pt continued to refuse.  Pt safety maintained, call light at reach.
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
## 2024-08-11 VITALS
HEART RATE: 80 BPM | WEIGHT: 207.23 LBS | OXYGEN SATURATION: 97 % | RESPIRATION RATE: 20 BRPM | SYSTOLIC BLOOD PRESSURE: 116 MMHG | BODY MASS INDEX: 30.69 KG/M2 | TEMPERATURE: 97.3 F | DIASTOLIC BLOOD PRESSURE: 58 MMHG | HEIGHT: 69 IN

## 2024-08-11 LAB
ANION GAP SERPL CALC-SCNC: 11 MMOL/L (ref 10–20)
BUN SERPL-MCNC: 53 MG/DL (ref 6–23)
CALCIUM SERPL-MCNC: 8.5 MG/DL (ref 8.6–10.3)
CHLORIDE SERPL-SCNC: 84 MMOL/L (ref 98–107)
CO2 SERPL-SCNC: 37 MMOL/L (ref 21–32)
CREAT SERPL-MCNC: 1.37 MG/DL (ref 0.5–1.3)
EGFRCR SERPLBLD CKD-EPI 2021: 53 ML/MIN/1.73M*2
ERYTHROCYTE [DISTWIDTH] IN BLOOD BY AUTOMATED COUNT: 16.1 % (ref 11.5–14.5)
GLUCOSE BLD MANUAL STRIP-MCNC: 108 MG/DL (ref 74–99)
GLUCOSE BLD MANUAL STRIP-MCNC: 157 MG/DL (ref 74–99)
GLUCOSE BLD MANUAL STRIP-MCNC: 161 MG/DL (ref 74–99)
GLUCOSE BLD MANUAL STRIP-MCNC: 92 MG/DL (ref 74–99)
GLUCOSE SERPL-MCNC: 119 MG/DL (ref 74–99)
HCT VFR BLD AUTO: 34 % (ref 41–52)
HGB BLD-MCNC: 10.4 G/DL (ref 13.5–17.5)
MAGNESIUM SERPL-MCNC: 2.15 MG/DL (ref 1.6–2.4)
MCH RBC QN AUTO: 29.5 PG (ref 26–34)
MCHC RBC AUTO-ENTMCNC: 30.6 G/DL (ref 32–36)
MCV RBC AUTO: 96 FL (ref 80–100)
NRBC BLD-RTO: 0 /100 WBCS (ref 0–0)
PLATELET # BLD AUTO: 190 X10*3/UL (ref 150–450)
POTASSIUM SERPL-SCNC: 4.7 MMOL/L (ref 3.5–5.3)
RBC # BLD AUTO: 3.53 X10*6/UL (ref 4.5–5.9)
SODIUM SERPL-SCNC: 127 MMOL/L (ref 136–145)
WBC # BLD AUTO: 9 X10*3/UL (ref 4.4–11.3)

## 2024-08-11 PROCEDURE — 2500000001 HC RX 250 WO HCPCS SELF ADMINISTERED DRUGS (ALT 637 FOR MEDICARE OP): Performed by: INTERNAL MEDICINE

## 2024-08-11 PROCEDURE — 2500000001 HC RX 250 WO HCPCS SELF ADMINISTERED DRUGS (ALT 637 FOR MEDICARE OP): Performed by: NURSE PRACTITIONER

## 2024-08-11 PROCEDURE — 80048 BASIC METABOLIC PNL TOTAL CA: CPT | Performed by: NURSE PRACTITIONER

## 2024-08-11 PROCEDURE — 2500000005 HC RX 250 GENERAL PHARMACY W/O HCPCS: Performed by: NURSE PRACTITIONER

## 2024-08-11 PROCEDURE — 2500000001 HC RX 250 WO HCPCS SELF ADMINISTERED DRUGS (ALT 637 FOR MEDICARE OP)

## 2024-08-11 PROCEDURE — 83735 ASSAY OF MAGNESIUM: CPT | Performed by: NURSE PRACTITIONER

## 2024-08-11 PROCEDURE — 36415 COLL VENOUS BLD VENIPUNCTURE: CPT | Performed by: NURSE PRACTITIONER

## 2024-08-11 PROCEDURE — 2500000002 HC RX 250 W HCPCS SELF ADMINISTERED DRUGS (ALT 637 FOR MEDICARE OP, ALT 636 FOR OP/ED): Performed by: NURSE PRACTITIONER

## 2024-08-11 PROCEDURE — 2500000002 HC RX 250 W HCPCS SELF ADMINISTERED DRUGS (ALT 637 FOR MEDICARE OP, ALT 636 FOR OP/ED)

## 2024-08-11 PROCEDURE — 94640 AIRWAY INHALATION TREATMENT: CPT

## 2024-08-11 PROCEDURE — 2060000001 HC INTERMEDIATE ICU ROOM DAILY

## 2024-08-11 PROCEDURE — 94660 CPAP INITIATION&MGMT: CPT

## 2024-08-11 PROCEDURE — 85027 COMPLETE CBC AUTOMATED: CPT | Performed by: NURSE PRACTITIONER

## 2024-08-11 PROCEDURE — 82947 ASSAY GLUCOSE BLOOD QUANT: CPT

## 2024-08-11 PROCEDURE — 2500000002 HC RX 250 W HCPCS SELF ADMINISTERED DRUGS (ALT 637 FOR MEDICARE OP, ALT 636 FOR OP/ED): Performed by: INTERNAL MEDICINE

## 2024-08-11 PROCEDURE — S4991 NICOTINE PATCH NONLEGEND: HCPCS | Performed by: NURSE PRACTITIONER

## 2024-08-11 PROCEDURE — 2500000005 HC RX 250 GENERAL PHARMACY W/O HCPCS: Performed by: INTERNAL MEDICINE

## 2024-08-11 PROCEDURE — 2500000004 HC RX 250 GENERAL PHARMACY W/ HCPCS (ALT 636 FOR OP/ED): Performed by: NURSE PRACTITIONER

## 2024-08-11 RX ORDER — AMOXICILLIN AND CLAVULANATE POTASSIUM 500; 125 MG/1; MG/1
1 TABLET, FILM COATED ORAL EVERY 12 HOURS SCHEDULED
Status: DISCONTINUED | OUTPATIENT
Start: 2024-08-11 | End: 2024-08-11

## 2024-08-11 RX ORDER — AMOXICILLIN AND CLAVULANATE POTASSIUM 500; 125 MG/1; MG/1
1 TABLET, FILM COATED ORAL EVERY 12 HOURS SCHEDULED
Status: DISPENSED | OUTPATIENT
Start: 2024-08-11 | End: 2024-08-14

## 2024-08-11 ASSESSMENT — LIFESTYLE VARIABLES
TOTAL SCORE: 1
TREMOR: NO TREMOR
AGITATION: SOMEWHAT MORE THAN NORMAL ACTIVITY
ANXIETY: NO ANXIETY, AT EASE
VISUAL DISTURBANCES: NOT PRESENT
AUDITORY DISTURBANCES: NOT PRESENT
ORIENTATION AND CLOUDING OF SENSORIUM: ORIENTED AND CAN DO SERIAL ADDITIONS
HEADACHE, FULLNESS IN HEAD: NOT PRESENT
NAUSEA AND VOMITING: NO NAUSEA AND NO VOMITING
PAROXYSMAL SWEATS: NO SWEAT VISIBLE

## 2024-08-11 ASSESSMENT — PAIN - FUNCTIONAL ASSESSMENT
PAIN_FUNCTIONAL_ASSESSMENT: 0-10

## 2024-08-11 ASSESSMENT — PAIN SCALES - GENERAL
PAINLEVEL_OUTOF10: 0 - NO PAIN

## 2024-08-11 NOTE — PROGRESS NOTES
RT called for desaturation while patient was sleeping; while on 3s RT advised to put patient on NRB mask until RT arrived; when RT arrived patient put on bipap; initially patient was anxious about machine, RT explained patient had higher oxygen needs and patient wearing bipap at this time.

## 2024-08-11 NOTE — CARE PLAN
The clinical goals for the shift include The patient will maintain a pulse ox of 94% or greater on supplemental oxygen. He will not have need for increase in supplemental oxygen by 8/11 0700    Problem: Respiratory  Goal: Minimize anxiety/maximize coping throughout shift  Outcome: Progressing     Problem: Fall/Injury  Goal: Pace activities to prevent fatigue by end of the shift  Outcome: Progressing     Problem: Fall/Injury  Goal: Be free from injury by end of the shift  Outcome: Progressing     Problem: Respiratory  Goal: Minimize anxiety/maximize coping throughout shift  Outcome: Progressing     Problem: Skin  Goal: Prevent/minimize sheer/friction injuries  Outcome: Progressing  Flowsheets (Taken 8/10/2024 2000)  Prevent/minimize sheer/friction injuries: Turn/reposition every 2 hours/use positioning/transfer devices   The patient used the walker and staff assistance to ambulate from the chair to the bed. He remained safe and injury free.  He required increased level of oxygen overnight. To maintain a pulse ox greater than 94%, he had to have 15 liters of oxygen via oximyzer n/c. The patient sleeps with his mouth open and desaturates to 84%. He refused a Venti-mask and he refused to where the BIPAP. When awake, he was maintaining  a pulse ox greater than 92% on 5 liters n/c oximyzer.

## 2024-08-11 NOTE — CARE PLAN
Problem: Skin  Goal: Prevent/minimize sheer/friction injuries  Outcome: Progressing     Problem: Pain  Goal: Turns in bed with improved pain control throughout the shift  Outcome: Progressing     Problem: Respiratory  Goal: Minimize anxiety/maximize coping throughout shift  Outcome: Progressing     Problem: Discharge Planning  Goal: Discharge to home or other facility with appropriate resources  Outcome: Progressing     Problem: Fall/Injury  Goal: Not fall by end of shift  Outcome: Progressing  Goal: Be free from injury by end of the shift  Outcome: Progressing  Goal: Verbalize understanding of personal risk factors for fall in the hospital  Outcome: Progressing  Goal: Verbalize understanding of risk factor reduction measures to prevent injury from fall in the home  Outcome: Progressing  Goal: Use assistive devices by end of the shift  Outcome: Progressing  Goal: Pace activities to prevent fatigue by end of the shift  Outcome: Progressing   The patient's goals for the shift include      The clinical goals for the shift include patient will remain above 92% oxygen on supplemental oxygen    Patient is progressing towards discharge

## 2024-08-11 NOTE — NURSING NOTE
"Discussed use of Bipap with pt. Pt declined use of Bipap tonight. Pt states \"I am not opposed to using it, but I have been fine without it\". Per pt he does not use NIV at home.  "

## 2024-08-11 NOTE — PROGRESS NOTES
Milton Lane is a 74 y.o. male on day 4 of admission presenting with Acute on chronic hypoxic respiratory failure (Multi).      Subjective   NO CP/sob       Objective         Current Facility-Administered Medications:     acetaminophen (Tylenol) tablet 650 mg, 650 mg, oral, q4h PRN **OR** acetaminophen (Tylenol) oral liquid 650 mg, 650 mg, oral, q4h PRN **OR** acetaminophen (Tylenol) suppository 650 mg, 650 mg, rectal, q4h PRN, DEBBIE Holden    albuterol 2.5 mg /3 mL (0.083 %) nebulizer solution 2.5 mg, 2.5 mg, nebulization, q4h PRN, DEBBIE Holden, 2.5 mg at 08/09/24 0046    amiodarone (Pacerone) tablet 200 mg, 200 mg, oral, Nightly, Angela Grover MD, 200 mg at 08/10/24 2037    amoxicillin-pot clavulanate (Augmentin) 500-125 mg per tablet 1 tablet, 1 tablet, oral, q12h SURJIT, Wendi Mi PA-C, 1 tablet at 08/11/24 1351    aspirin EC tablet 81 mg, 81 mg, oral, Daily, DEBBIE Holden, 81 mg at 08/11/24 0823    atorvastatin (Lipitor) tablet 20 mg, 20 mg, oral, Daily, DAVID Holden-CNP, 20 mg at 08/10/24 0922    budesonide (Pulmicort) 0.5 mg/2 mL nebulizer solution 0.5 mg, 0.5 mg, nebulization, BID, DEBBIE Holden, 0.5 mg at 08/10/24 2002    bumetanide (Bumex) tablet 3 mg, 3 mg, oral, BID, Sofiya Greenwood MD, 3 mg at 08/11/24 0823    dextrose 50 % injection 12.5 g, 12.5 g, intravenous, q15 min PRN, DAVID Holden-CNP    dextrose 50 % injection 25 g, 25 g, intravenous, q15 min PRN, DEBBIE Holden    empagliflozin (Jardiance) tablet 12.5 mg, 12.5 mg, oral, Daily, DEBBIE Holden, 12.5 mg at 08/11/24 0823    enoxaparin (Lovenox) syringe 40 mg, 40 mg, subcutaneous, q24h, DEBBIE Holden, 40 mg at 08/10/24 2037    fluticasone (Flonase) nasal spray 2 spray, 2 spray, Each Nostril, Daily, DEBBIE Holden    folic acid (Folvite) tablet 1 mg, 1 mg, oral, Daily, DEBBIE Holden, 1 mg at 08/11/24 0823    formoterol  (Perforomist) 20 mcg/2 mL nebulizer solution 20 mcg, 20 mcg, nebulization, BID, DEBBIE Holden, 20 mcg at 08/10/24 2002    glucagon (Glucagen) injection 1 mg, 1 mg, intramuscular, q15 min PRN, DEBBIE Holden    glucagon (Glucagen) injection 1 mg, 1 mg, intramuscular, q15 min PRN, DEBBIE Holden    insulin lispro (HumaLOG) injection 0-5 Units, 0-5 Units, subcutaneous, 4x daily, DEBBIE Holden, 1 Units at 08/08/24 2053    ipratropium-albuteroL (Duo-Neb) 0.5-2.5 mg/3 mL nebulizer solution 3 mL, 3 mL, nebulization, 4x daily, Rishi Anthony MD, 3 mL at 08/11/24 1621    isosorbide mononitrate ER (Imdur) 24 hr tablet 30 mg, 30 mg, oral, Nightly, DEBBIE Pompa, 30 mg at 08/10/24 2042    lamoTRIgine (LaMICtal) tablet 100 mg, 100 mg, oral, Nightly, Angela Grover MD, 100 mg at 08/10/24 2044    lidocaine 4 % patch 1 patch, 1 patch, transdermal, Daily, DEBBIE Pompa, 1 patch at 08/11/24 0824    LORazepam (Ativan) injection 0.5 mg, 0.5 mg, intravenous, q2h PRN **OR** LORazepam (Ativan) injection 1 mg, 1 mg, intravenous, q2h PRN **OR** LORazepam (Ativan) injection 2 mg, 2 mg, intravenous, q2h PRN, DEBBIE Holden    melatonin tablet 3 mg, 3 mg, oral, Nightly PRN, DEBBIE Holden    metoprolol succinate XL (Toprol-XL) 24 hr tablet 25 mg, 25 mg, oral, Daily, DEBBIE Pompa, 25 mg at 08/11/24 0823    multivitamin with minerals 1 tablet, 1 tablet, oral, Daily, DEBBIE Holden, 1 tablet at 08/11/24 0823    nicotine (Nicoderm CQ) 7 mg/24 hr patch 1 patch, 1 patch, transdermal, q24h, DEBBIE Holden, 1 patch at 08/11/24 0824    oxygen (O2) therapy, , inhalation, Continuous PRN - O2/gases, Rishi Anthony MD, 50 percent at 08/11/24 0112    oxygen (O2) therapy, , inhalation, Continuous PRN - O2/gases, Rishi Anthony MD, 15 L/min at 08/11/24 0511    polyethylene glycol (Glycolax, Miralax) packet 17 g, 17 g,  "oral, Daily, DEBBIE Holden    [Held by provider] spironolactone (Aldactone) tablet 12.5 mg, 12.5 mg, oral, BID, DEBBIE Holden    thiamine (Vitamin B-1) tablet 100 mg, 100 mg, oral, Daily, Rafaela Khan, APRN-CNP, 100 mg at 08/11/24 0823    traZODone (Desyrel) tablet 50 mg, 50 mg, oral, Nightly, DEBBIE Holden, 50 mg at 08/10/24 2037    urea (Ure-Na) oral powder 15 g, 15 g, oral, BID, Sofiya Greenwood MD       Physical Exam  HENT:      Head: Normocephalic.   Eyes:      Conjunctiva/sclera: Conjunctivae normal.   Cardiovascular:      Rate and Rhythm: Regular rhythm.   Pulmonary:      Breath sounds: Normal breath sounds.   Abdominal:      General: Bowel sounds are normal.      Palpations: Abdomen is soft.   Musculoskeletal:         General: Normal range of motion.   Skin:     General: Skin is warm and dry.   Neurological:      General: No focal deficit present.      Mental Status: He is alert.   Psychiatric:         Behavior: Behavior normal.           Last Recorded Vitals  Blood pressure 116/58, pulse 80, temperature 36.3 °C (97.3 °F), temperature source Temporal, resp. rate 20, height 1.753 m (5' 9.02\"), weight 94 kg (207 lb 3.7 oz), SpO2 97%.  Intake/Output last 3 Shifts:  I/O last 3 completed shifts:  In: 1561.9 (16.6 mL/kg) [P.O.:1550; I.V.:11.9 (0.1 mL/kg)]  Out: 2600 (27.7 mL/kg) [Urine:2600 (0.8 mL/kg/hr)]  Weight: 94 kg     Labs:       Results for orders placed or performed during the hospital encounter of 08/07/24 (from the past 24 hour(s))   POCT GLUCOSE   Result Value Ref Range    POCT Glucose 137 (H) 74 - 99 mg/dL   CBC   Result Value Ref Range    WBC 9.0 4.4 - 11.3 x10*3/uL    nRBC 0.0 0.0 - 0.0 /100 WBCs    RBC 3.53 (L) 4.50 - 5.90 x10*6/uL    Hemoglobin 10.4 (L) 13.5 - 17.5 g/dL    Hematocrit 34.0 (L) 41.0 - 52.0 %    MCV 96 80 - 100 fL    MCH 29.5 26.0 - 34.0 pg    MCHC 30.6 (L) 32.0 - 36.0 g/dL    RDW 16.1 (H) 11.5 - 14.5 %    Platelets 190 150 - 450 x10*3/uL "   Basic metabolic panel   Result Value Ref Range    Glucose 119 (H) 74 - 99 mg/dL    Sodium 127 (L) 136 - 145 mmol/L    Potassium 4.7 3.5 - 5.3 mmol/L    Chloride 84 (L) 98 - 107 mmol/L    Bicarbonate 37 (H) 21 - 32 mmol/L    Anion Gap 11 10 - 20 mmol/L    Urea Nitrogen 53 (H) 6 - 23 mg/dL    Creatinine 1.37 (H) 0.50 - 1.30 mg/dL    eGFR 53 (L) >60 mL/min/1.73m*2    Calcium 8.5 (L) 8.6 - 10.3 mg/dL   Magnesium   Result Value Ref Range    Magnesium 2.15 1.60 - 2.40 mg/dL   POCT GLUCOSE   Result Value Ref Range    POCT Glucose 108 (H) 74 - 99 mg/dL   POCT GLUCOSE   Result Value Ref Range    POCT Glucose 92 74 - 99 mg/dL   POCT GLUCOSE   Result Value Ref Range    POCT Glucose 157 (H) 74 - 99 mg/dL              Assessment/Plan   Principal Problem:    Acute on chronic hypoxic respiratory failure (Multi)  Active Problems:    Acute on chronic combined systolic (congestive) and diastolic (congestive) heart failure (Multi)    Hyponatremia    Hypervolemia    COPD (chronic obstructive pulmonary disease) (Multi)    Abnormal urinalysis      Plan: Pt is requesting to remove his [place], SOB is improving, c/w diuresis, was restarted on Aldactone. Pt is intolerant to ACEI/ARBs, monitor kidney function and electrolytes, d/w CNP, follow closely.          Rishi Anthony MD

## 2024-08-11 NOTE — PROGRESS NOTES
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 non-focal, motor strength normal upper lower extremities, sensory exam intact.       Assessment/Plan   I48.91 Atrial fibrillation 6590  I50.9 2 acute on chronic diastolic heart failure  E03.9 hyponatemia  I35.1 aortic stenosis    Continue IV diuresis as directed by nephrology  Restart spironolactone once cleared by nephrology  History of intolerance to ACE and ARB's  Request [facility] records    I will be covering for his regular for the regular cardiologist until Monday Dr. Denisse Payne      Code Status:  DNR and No Intubation    I spent 40 minutes in the professional and overall care of this patient.        Rui Alvarado MD

## 2024-08-12 LAB
ANION GAP SERPL CALC-SCNC: 7 MMOL/L (ref 10–20)
BUN SERPL-MCNC: 46 MG/DL (ref 6–23)
CALCIUM SERPL-MCNC: 8.4 MG/DL (ref 8.6–10.3)
CHLORIDE SERPL-SCNC: 83 MMOL/L (ref 98–107)
CO2 SERPL-SCNC: 44 MMOL/L (ref 21–32)
CREAT SERPL-MCNC: 1 MG/DL (ref 0.5–1.3)
EGFRCR SERPLBLD CKD-EPI 2021: 78 ML/MIN/1.73M*2
ERYTHROCYTE [DISTWIDTH] IN BLOOD BY AUTOMATED COUNT: 16 % (ref 11.5–14.5)
GLUCOSE BLD MANUAL STRIP-MCNC: 116 MG/DL (ref 74–99)
GLUCOSE BLD MANUAL STRIP-MCNC: 140 MG/DL (ref 74–99)
GLUCOSE BLD MANUAL STRIP-MCNC: 159 MG/DL (ref 74–99)
GLUCOSE SERPL-MCNC: 97 MG/DL (ref 74–99)
HCT VFR BLD AUTO: 34.7 % (ref 41–52)
HGB BLD-MCNC: 10.6 G/DL (ref 13.5–17.5)
MAGNESIUM SERPL-MCNC: 1.71 MG/DL (ref 1.6–2.4)
MCH RBC QN AUTO: 29.7 PG (ref 26–34)
MCHC RBC AUTO-ENTMCNC: 30.5 G/DL (ref 32–36)
MCV RBC AUTO: 97 FL (ref 80–100)
NRBC BLD-RTO: 0 /100 WBCS (ref 0–0)
PLATELET # BLD AUTO: 188 X10*3/UL (ref 150–450)
POTASSIUM SERPL-SCNC: 4 MMOL/L (ref 3.5–5.3)
RBC # BLD AUTO: 3.57 X10*6/UL (ref 4.5–5.9)
SODIUM SERPL-SCNC: 130 MMOL/L (ref 136–145)
WBC # BLD AUTO: 8.3 X10*3/UL (ref 4.4–11.3)

## 2024-08-12 PROCEDURE — 2500000004 HC RX 250 GENERAL PHARMACY W/ HCPCS (ALT 636 FOR OP/ED): Performed by: NURSE PRACTITIONER

## 2024-08-12 PROCEDURE — 36415 COLL VENOUS BLD VENIPUNCTURE: CPT | Performed by: NURSE PRACTITIONER

## 2024-08-12 PROCEDURE — 2500000002 HC RX 250 W HCPCS SELF ADMINISTERED DRUGS (ALT 637 FOR MEDICARE OP, ALT 636 FOR OP/ED): Performed by: INTERNAL MEDICINE

## 2024-08-12 PROCEDURE — 99232 SBSQ HOSP IP/OBS MODERATE 35: CPT | Performed by: INTERNAL MEDICINE

## 2024-08-12 PROCEDURE — 2500000002 HC RX 250 W HCPCS SELF ADMINISTERED DRUGS (ALT 637 FOR MEDICARE OP, ALT 636 FOR OP/ED)

## 2024-08-12 PROCEDURE — 2500000001 HC RX 250 WO HCPCS SELF ADMINISTERED DRUGS (ALT 637 FOR MEDICARE OP)

## 2024-08-12 PROCEDURE — 2500000005 HC RX 250 GENERAL PHARMACY W/O HCPCS: Performed by: INTERNAL MEDICINE

## 2024-08-12 PROCEDURE — 2500000002 HC RX 250 W HCPCS SELF ADMINISTERED DRUGS (ALT 637 FOR MEDICARE OP, ALT 636 FOR OP/ED): Performed by: NURSE PRACTITIONER

## 2024-08-12 PROCEDURE — 2060000001 HC INTERMEDIATE ICU ROOM DAILY

## 2024-08-12 PROCEDURE — 85027 COMPLETE CBC AUTOMATED: CPT | Performed by: NURSE PRACTITIONER

## 2024-08-12 PROCEDURE — 80048 BASIC METABOLIC PNL TOTAL CA: CPT | Performed by: NURSE PRACTITIONER

## 2024-08-12 PROCEDURE — 2500000001 HC RX 250 WO HCPCS SELF ADMINISTERED DRUGS (ALT 637 FOR MEDICARE OP): Performed by: NURSE PRACTITIONER

## 2024-08-12 PROCEDURE — 83735 ASSAY OF MAGNESIUM: CPT | Performed by: NURSE PRACTITIONER

## 2024-08-12 PROCEDURE — 2500000005 HC RX 250 GENERAL PHARMACY W/O HCPCS: Performed by: NURSE PRACTITIONER

## 2024-08-12 PROCEDURE — 94640 AIRWAY INHALATION TREATMENT: CPT

## 2024-08-12 PROCEDURE — 2500000001 HC RX 250 WO HCPCS SELF ADMINISTERED DRUGS (ALT 637 FOR MEDICARE OP): Performed by: INTERNAL MEDICINE

## 2024-08-12 PROCEDURE — 82947 ASSAY GLUCOSE BLOOD QUANT: CPT

## 2024-08-12 PROCEDURE — S4991 NICOTINE PATCH NONLEGEND: HCPCS | Performed by: NURSE PRACTITIONER

## 2024-08-12 RX ORDER — LANOLIN ALCOHOL/MO/W.PET/CERES
400 CREAM (GRAM) TOPICAL ONCE
Status: COMPLETED | OUTPATIENT
Start: 2024-08-12 | End: 2024-08-12

## 2024-08-12 RX ORDER — ALBUTEROL SULFATE 0.83 MG/ML
2.5 SOLUTION RESPIRATORY (INHALATION) EVERY 2 HOUR PRN
Status: DISCONTINUED | OUTPATIENT
Start: 2024-08-12 | End: 2024-08-15 | Stop reason: HOSPADM

## 2024-08-12 ASSESSMENT — COGNITIVE AND FUNCTIONAL STATUS - GENERAL
MOVING TO AND FROM BED TO CHAIR: A LITTLE
TOILETING: A LOT
DAILY ACTIVITIY SCORE: 14
DRESSING REGULAR UPPER BODY CLOTHING: A LOT
DRESSING REGULAR LOWER BODY CLOTHING: A LOT
PERSONAL GROOMING: A LOT
MOBILITY SCORE: 16
WALKING IN HOSPITAL ROOM: A LITTLE
MOVING FROM LYING ON BACK TO SITTING ON SIDE OF FLAT BED WITH BEDRAILS: A LITTLE
HELP NEEDED FOR BATHING: A LOT
TURNING FROM BACK TO SIDE WHILE IN FLAT BAD: A LITTLE
STANDING UP FROM CHAIR USING ARMS: A LITTLE
CLIMB 3 TO 5 STEPS WITH RAILING: TOTAL

## 2024-08-12 ASSESSMENT — ENCOUNTER SYMPTOMS
ABDOMINAL DISTENTION: 1
FATIGUE: 1
PALPITATIONS: 0
COUGH: 1
SLEEP DISTURBANCE: 1
ARTHRALGIAS: 1
SHORTNESS OF BREATH: 1

## 2024-08-12 ASSESSMENT — LIFESTYLE VARIABLES
NAUSEA AND VOMITING: NO NAUSEA AND NO VOMITING
ORIENTATION AND CLOUDING OF SENSORIUM: ORIENTED AND CAN DO SERIAL ADDITIONS
PAROXYSMAL SWEATS: NO SWEAT VISIBLE
TOTAL SCORE: 0
TREMOR: NO TREMOR
VISUAL DISTURBANCES: NOT PRESENT
AUDITORY DISTURBANCES: NOT PRESENT
AGITATION: NORMAL ACTIVITY
NAUSEA AND VOMITING: NO NAUSEA AND NO VOMITING
PAROXYSMAL SWEATS: NO SWEAT VISIBLE
HEADACHE, FULLNESS IN HEAD: NOT PRESENT
TOTAL SCORE: 1
ANXIETY: NO ANXIETY, AT EASE
PULSE: 76
BLOOD PRESSURE: 104/55
ANXIETY: NO ANXIETY, AT EASE
AUDITORY DISTURBANCES: NOT PRESENT
TREMOR: NOT VISIBLE, BUT CAN BE FELT FINGERTIP TO FINGERTIP
ORIENTATION AND CLOUDING OF SENSORIUM: ORIENTED AND CAN DO SERIAL ADDITIONS
AGITATION: NORMAL ACTIVITY
VISUAL DISTURBANCES: NOT PRESENT
HEADACHE, FULLNESS IN HEAD: NOT PRESENT

## 2024-08-12 ASSESSMENT — PAIN - FUNCTIONAL ASSESSMENT
PAIN_FUNCTIONAL_ASSESSMENT: 0-10

## 2024-08-12 ASSESSMENT — PAIN SCALES - GENERAL
PAINLEVEL_OUTOF10: 0 - NO PAIN

## 2024-08-12 NOTE — CARE PLAN
The patient's goals for the shift include      The clinical goals for the shift include patient will remain above 92% oxygen on supplemental oxygen

## 2024-08-12 NOTE — PROGRESS NOTES
Milton Lane is a 74 y.o. male on day 5 of admission presenting with Acute on chronic hypoxic respiratory failure (Multi).    Subjective   No cp       Objective         Current Facility-Administered Medications:     acetaminophen (Tylenol) tablet 650 mg, 650 mg, oral, q4h PRN **OR** acetaminophen (Tylenol) oral liquid 650 mg, 650 mg, oral, q4h PRN **OR** acetaminophen (Tylenol) suppository 650 mg, 650 mg, rectal, q4h PRN, DAVID Holden-CNP    albuterol 2.5 mg /3 mL (0.083 %) nebulizer solution 2.5 mg, 2.5 mg, nebulization, q2h PRN, Rishi Anthony MD    amiodarone (Pacerone) tablet 200 mg, 200 mg, oral, Nightly, Angela Grover MD, 200 mg at 08/11/24 2131    aspirin EC tablet 81 mg, 81 mg, oral, Daily, DAVID Holden-CNP, 81 mg at 08/12/24 0841    atorvastatin (Lipitor) tablet 20 mg, 20 mg, oral, Daily, Stacey Hernandez APRN-CNP, 20 mg at 08/11/24 2131    budesonide (Pulmicort) 0.5 mg/2 mL nebulizer solution 0.5 mg, 0.5 mg, nebulization, BID, DAVID Holden-CNP, 0.5 mg at 08/12/24 0809    bumetanide (Bumex) tablet 3 mg, 3 mg, oral, BID, Sofiya Greenwood MD, 3 mg at 08/12/24 0841    dextrose 50 % injection 12.5 g, 12.5 g, intravenous, q15 min PRN, Stacey Hernandez APRN-CNP    dextrose 50 % injection 25 g, 25 g, intravenous, q15 min PRN, DAVID Holden-CNP    empagliflozin (Jardiance) tablet 12.5 mg, 12.5 mg, oral, Daily, Stacey Hernandez APRN-CNP, 12.5 mg at 08/12/24 0842    enoxaparin (Lovenox) syringe 40 mg, 40 mg, subcutaneous, q24h, DAVID Holden-CNP, 40 mg at 08/11/24 2130    fluticasone (Flonase) nasal spray 2 spray, 2 spray, Each Nostril, Daily, DAVID Holden-CNP, 2 spray at 08/12/24 0842    folic acid (Folvite) tablet 1 mg, 1 mg, oral, Daily, DEBBIE Holden, 1 mg at 08/12/24 0842    formoterol (Perforomist) 20 mcg/2 mL nebulizer solution 20 mcg, 20 mcg, nebulization, BID, DAVID Holden-CNP, 20 mcg at 08/12/24 0808    glucagon (Glucagen)  injection 1 mg, 1 mg, intramuscular, q15 min PRN, DEBBIE Holden    glucagon (Glucagen) injection 1 mg, 1 mg, intramuscular, q15 min PRN, DEBBIE Holden    insulin lispro (HumaLOG) injection 0-5 Units, 0-5 Units, subcutaneous, 4x daily, DEBBIE Holden, 1 Units at 08/08/24 2053    ipratropium-albuteroL (Duo-Neb) 0.5-2.5 mg/3 mL nebulizer solution 3 mL, 3 mL, nebulization, 4x daily, Rishi Anthony MD, 3 mL at 08/12/24 0807    isosorbide mononitrate ER (Imdur) 24 hr tablet 30 mg, 30 mg, oral, Nightly, DEBBIE Pompa, 30 mg at 08/11/24 2334    lamoTRIgine (LaMICtal) tablet 100 mg, 100 mg, oral, Nightly, Angela Grover MD, 100 mg at 08/11/24 2131    lidocaine 4 % patch 1 patch, 1 patch, transdermal, Daily, DEBBIE Pompa, 1 patch at 08/12/24 0842    LORazepam (Ativan) injection 0.5 mg, 0.5 mg, intravenous, q2h PRN **OR** LORazepam (Ativan) injection 1 mg, 1 mg, intravenous, q2h PRN **OR** LORazepam (Ativan) injection 2 mg, 2 mg, intravenous, q2h PRN, DEBBIE Holden    magnesium oxide (Mag-Ox) tablet 400 mg, 400 mg, oral, Once, DEBBIE Holden    melatonin tablet 3 mg, 3 mg, oral, Nightly PRN, DEBBIE Holden    metoprolol succinate XL (Toprol-XL) 24 hr tablet 25 mg, 25 mg, oral, Daily, DEBBIE Pompa, 25 mg at 08/12/24 0841    multivitamin with minerals 1 tablet, 1 tablet, oral, Daily, DEBBIE Holden, 1 tablet at 08/12/24 0842    nicotine (Nicoderm CQ) 7 mg/24 hr patch 1 patch, 1 patch, transdermal, q24h, DEBBIE Holden, 1 patch at 08/12/24 0842    oxygen (O2) therapy, , inhalation, Continuous PRN - O2/gases, Rishi Anthony MD, 7 L/min at 08/12/24 0813    oxygen (O2) therapy, , inhalation, Continuous PRN - O2/gases, Rishi Anthony MD    polyethylene glycol (Glycolax, Miralax) packet 17 g, 17 g, oral, Daily, DEBBIE Holden    [Held by provider] spironolactone (Aldactone) tablet  "12.5 mg, 12.5 mg, oral, BID, Stacey M Mary, APRN-CNP    thiamine (Vitamin B-1) tablet 100 mg, 100 mg, oral, Daily, Rafaela Khan, APRN-CNP, 100 mg at 08/12/24 0841    traZODone (Desyrel) tablet 50 mg, 50 mg, oral, Nightly, Stacey MANUEL Mary, APRN-CNP, 50 mg at 08/11/24 2130    urea (Ure-Na) oral powder 15 g, 15 g, oral, BID, Sofiya Greenwood MD, 15 g at 08/12/24 0842       Physical Exam  HENT:      Head: Normocephalic.   Eyes:      Conjunctiva/sclera: Conjunctivae normal.   Cardiovascular:      Rate and Rhythm: Regular rhythm.   Pulmonary:      Breath sounds: Normal breath sounds.   Abdominal:      General: Bowel sounds are normal.      Palpations: Abdomen is soft.   Musculoskeletal:         General: Normal range of motion.   Skin:     General: Skin is warm and dry.   Neurological:      General: No focal deficit present.      Mental Status: He is alert.   Psychiatric:         Behavior: Behavior normal.           Last Recorded Vitals  Blood pressure 123/57, pulse 80, temperature 36.3 °C (97.3 °F), temperature source Temporal, resp. rate 10, height 1.753 m (5' 9.02\"), weight 94 kg (207 lb 3.7 oz), SpO2 100%.  Intake/Output last 3 Shifts:  I/O last 3 completed shifts:  In: 1951.8 (20.8 mL/kg) [P.O.:1950; I.V.:1.8 (0 mL/kg)]  Out: 4252 (45.2 mL/kg) [Urine:4250 (1.3 mL/kg/hr); Stool:2]  Weight: 94 kg     Labs:       Results for orders placed or performed during the hospital encounter of 08/07/24 (from the past 24 hour(s))   POCT GLUCOSE   Result Value Ref Range    POCT Glucose 92 74 - 99 mg/dL   POCT GLUCOSE   Result Value Ref Range    POCT Glucose 157 (H) 74 - 99 mg/dL   POCT GLUCOSE   Result Value Ref Range    POCT Glucose 161 (H) 74 - 99 mg/dL   CBC   Result Value Ref Range    WBC 8.3 4.4 - 11.3 x10*3/uL    nRBC 0.0 0.0 - 0.0 /100 WBCs    RBC 3.57 (L) 4.50 - 5.90 x10*6/uL    Hemoglobin 10.6 (L) 13.5 - 17.5 g/dL    Hematocrit 34.7 (L) 41.0 - 52.0 %    MCV 97 80 - 100 fL    MCH 29.7 26.0 - 34.0 pg    MCHC 30.5 (L) " 32.0 - 36.0 g/dL    RDW 16.0 (H) 11.5 - 14.5 %    Platelets 188 150 - 450 x10*3/uL   Basic metabolic panel   Result Value Ref Range    Glucose 97 74 - 99 mg/dL    Sodium 130 (L) 136 - 145 mmol/L    Potassium 4.0 3.5 - 5.3 mmol/L    Chloride 83 (L) 98 - 107 mmol/L    Bicarbonate 44 (HH) 21 - 32 mmol/L    Anion Gap 7 (L) 10 - 20 mmol/L    Urea Nitrogen 46 (H) 6 - 23 mg/dL    Creatinine 1.00 0.50 - 1.30 mg/dL    eGFR 78 >60 mL/min/1.73m*2    Calcium 8.4 (L) 8.6 - 10.3 mg/dL   Magnesium   Result Value Ref Range    Magnesium 1.71 1.60 - 2.40 mg/dL              Assessment/Plan   Principal Problem:    Acute on chronic hypoxic respiratory failure (Multi)  Active Problems:    Acute on chronic combined systolic (congestive) and diastolic (congestive) heart failure (Multi)    Hyponatremia    Hypervolemia    COPD (chronic obstructive pulmonary disease) (Multi)    Abnormal urinalysis        PLAN: Pt is slowly improving, FiO2 was titrated farrah, currently on 2 lt oxygen by Nasal canula, na+ level is improving, med list and labs reviewed for now c/w current Rx, follow closely.         Rishi Anthony MD

## 2024-08-12 NOTE — PROGRESS NOTES
08/12/24 1408   Discharge Planning   Who is requesting discharge planning? Provider   Home or Post Acute Services Post acute facilities (Rehab/SNF/etc)   Type of Post Acute Facility Services Skilled nursing   Expected Discharge Disposition SNF   Does the patient need discharge transport arranged? Yes   RoundTrip coordination needed? Yes   Has discharge transport been arranged? No     Message left for this T[facility] to call [facility] regarding this patient and discharge. Facility was wondering if patient was still coming to them on discharge. Informed them that patient, who also has [facility] benefits and [facility] is contracted with [facility], will possibly still be coming but patient can also refuse to go SNF also. Will keep facility updated.

## 2024-08-12 NOTE — CARE PLAN
The patient's goals for the shift include      The clinical goals for the shift include patient will remain above 92% oxygen on supplemental oxygen      Problem: Skin  Goal: Prevent/minimize sheer/friction injuries  8/12/2024 0225 by Zahra Hollins RN  Outcome: Progressing  8/12/2024 0224 by Zahra Hollins RN  Outcome: Progressing     Problem: Pain  Goal: Turns in bed with improved pain control throughout the shift  8/12/2024 0225 by Zahra Hollins RN  Outcome: Progressing  8/12/2024 0224 by Zahra Hollins RN  Outcome: Progressing     Problem: Respiratory  Goal: Minimize anxiety/maximize coping throughout shift  8/12/2024 0225 by Zahra Hollins RN  Outcome: Progressing  8/12/2024 0224 by Zahra Hollins RN  Outcome: Progressing     Problem: Discharge Planning  Goal: Discharge to home or other facility with appropriate resources  8/12/2024 0225 by Zahra Hollins RN  Outcome: Progressing  8/12/2024 0224 by Zahra Hollins RN  Outcome: Progressing     Problem: Fall/Injury  Goal: Not fall by end of shift  8/12/2024 0225 by Zahra Hollins RN  Outcome: Progressing  8/12/2024 0224 by Zahra Hollins RN  Outcome: Progressing  Goal: Be free from injury by end of the shift  8/12/2024 0225 by Zahra Hollins RN  Outcome: Progressing  8/12/2024 0224 by Zahra Hollins RN  Outcome: Progressing  Goal: Verbalize understanding of personal risk factors for fall in the hospital  8/12/2024 0225 by Zahra Hollins RN  Outcome: Progressing  8/12/2024 0224 by Zahra Hollins RN  Outcome: Progressing  Goal: Verbalize understanding of risk factor reduction measures to prevent injury from fall in the home  8/12/2024 0225 by Zahra Hollins RN  Outcome: Progressing  8/12/2024 0224 by Zahra Hollins RN  Outcome: Progressing  Goal: Use assistive devices by end of the shift  8/12/2024 0225 by Zahra Hollins RN  Outcome: Progressing  8/12/2024 0224 by Zahra Hollins RN  Outcome: Progressing  Goal: Pace activities to prevent fatigue by end of the  shift  8/12/2024 0225 by Zahra Hollins RN  Outcome: Progressing  8/12/2024 0224 by Zahra Hollins, RN  Outcome: Progressing

## 2024-08-12 NOTE — PROGRESS NOTES
Cardiology Progress Note           Rounding Cardiologist:  Dr. Denisse Payne  Primary Cardiologist:  None - christina caldera Dennisar     Date:  8/12/2024  Patient:  Milton Lane  YOB: 1950  MRN:  [id number]   Admit Date:  8/7/2024      SUBJECTIVE    Milton Lane was seen and examined today at bedside.     Patient feels better. Has been weaned down to nasal cannula oxygen over the weekend. Denies chest pain. No dizziness except with bending over. Mild chronic congestion and cough which is predominantly non-productive.     Review of Systems   Constitutional:  Positive for fatigue.   Respiratory:  Positive for cough and shortness of breath.    Cardiovascular:  Positive for leg swelling. Negative for chest pain and palpitations.   Gastrointestinal:  Positive for abdominal distention.   Musculoskeletal:  Positive for arthralgias.   Psychiatric/Behavioral:  Positive for sleep disturbance.    All other systems reviewed and are negative.       VITALS     Vitals:    08/12/24 1500 08/12/24 1600 08/12/24 1622 08/12/24 1625   BP:       BP Location:       Patient Position:       Pulse: 84 80     Resp:       Temp:       TempSrc:       SpO2: 92% (!) 87% 94% 94%   Weight:       Height:           Intake/Output Summary (Last 24 hours) at 8/12/2024 1751  Last data filed at 8/12/2024 1625  Gross per 24 hour   Intake 1200 ml   Output 2700 ml   Net -1500 ml       Vitals:    08/10/24 0600   Weight: 94 kg (207 lb 3.7 oz)       CURRENT MEDICATIONS    amiodarone, 200 mg, oral, Nightly  aspirin, 81 mg, oral, Daily  atorvastatin, 20 mg, oral, Daily  budesonide, 0.5 mg, nebulization, BID  bumetanide, 3 mg, oral, BID  empagliflozin, 12.5 mg, oral, Daily  enoxaparin, 40 mg, subcutaneous, q24h  fluticasone, 2 spray, Each Nostril, Daily  folic acid, 1 mg, oral, Daily  formoterol, 20 mcg, nebulization, BID  insulin lispro, 0-5 Units, subcutaneous, 4x daily  ipratropium-albuteroL, 3 mL, nebulization, 4x daily  isosorbide mononitrate ER,  30 mg, oral, Nightly  lamoTRIgine, 100 mg, oral, Nightly  lidocaine, 1 patch, transdermal, Daily  metoprolol succinate XL, 25 mg, oral, Daily  multivitamin with minerals, 1 tablet, oral, Daily  nicotine, 1 patch, transdermal, q24h  polyethylene glycol, 17 g, oral, Daily  [Held by provider] spironolactone, 12.5 mg, oral, BID  thiamine, 100 mg, oral, Daily  traZODone, 50 mg, oral, Nightly  urea, 15 g, oral, BID         Current Outpatient Medications   Medication Instructions    albuterol (Ventolin HFA) 90 mcg/actuation inhaler 2 puffs, inhalation, Every 6 hours PRN    albuterol 2.5 mg, nebulization, Every 4 hours PRN    amiodarone (PACERONE) 200 mg, oral, Daily    aspirin 81 mg, oral, Daily    atorvastatin (LIPITOR) 20 mg, oral, Daily    bumetanide (BUMEX) 3 mg, oral, 2 times daily (morning and late afternoon)    empagliflozin (JARDIANCE) 12.5 mg, oral, Daily    fluticasone (Flonase) 50 mcg/actuation nasal spray 2 sprays, Each Nostril, Daily, Shake gently. Before first use, prime pump. After use, clean tip and replace cap.    fluticasone propion-salmeteroL (Advair Diskus) 250-50 mcg/dose diskus inhaler 1 puff, inhalation, 2 times daily RT, Rinse mouth with water after use to reduce aftertaste and incidence of candidiasis. Do not swallow.    isosorbide mononitrate ER (IMDUR) 30 mg, oral, Daily, Do not crush or chew.    lamoTRIgine (LAMICTAL) 100 mg, oral, Daily    magnesium oxide (MAG-OX) 400 mg, oral, Daily    melatonin 6 mg, oral, Nightly    menthol (Biofreeze, menthol,) 10.5 % aerosol,spray 1 spray, topical (top), 2 times daily PRN    metoprolol succinate XL (TOPROL-XL) 25 mg, oral, Daily, Do not crush or chew.    nicotine (Nicoderm CQ) 7 mg/24 hr patch 1 patch, transdermal, Every 24 hours    nicotine polacrilex (COMMIT) 2 mg, Mouth/Throat, As needed    oxygen (O2) gas therapy 1 each, inhalation, Continuous    spironolactone (ALDACTONE) 12.5 mg, oral, 2 times daily    traZODone (DESYREL) 50 mg, oral, Nightly     "    PHYSICAL EXAMINATION   GENERAL:  Well developed, well nourished, in no acute distress.  CHEST:  Symmetric and nontender.  NECK:  Supple, no JVD, no bruit.  LUNGS:  Nonlabored respirations, diminished at the bases with prolonged exp phase. Coarse rhonchi, no rales. .  HEART:  PMI nonpalpable, heart sounds distant. RR, occ irreg with normal S1 and S2, no S3. 2+ edema below the knees - not as firm as last week, chronic skin changes.   ABDOMEN: Soft, NT, ND without palpable organomegaly, normoactive bowel sounds.   EXTREMITIES:  Warm with good color, no clubbing or cyanosis.  There is edema noted.  NEURO/PSYCH:  Alert and oriented times three with approppriate behavior and responses.  INTEGUMENT: chronic skin changes bilateral LE    LAB DATA     CBC:   Results from last 7 days   Lab Units 08/12/24  0356   WBC AUTO x10*3/uL 8.3   RBC AUTO x10*6/uL 3.57*   HEMOGLOBIN g/dL 10.6*   HEMATOCRIT % 34.7*   PLATELETS AUTO x10*3/uL 188        CMP:    Results from last 7 days   Lab Units 08/12/24  0356   SODIUM mmol/L 130*   POTASSIUM mmol/L 4.0   CHLORIDE mmol/L 83*   CO2 mmol/L 44*   BUN mg/dL 46*   CREATININE mg/dL 1.00   GLUCOSE mg/dL 97   CALCIUM mg/dL 8.4*       Cardiac Enzymes:    Lab Results   Component Value Date    TROPHS 132 () 08/07/2024    TROPHS 86 () 08/07/2024    TROPHS 72 () 08/07/2024       Magnesium:    Lab Results   Component Value Date    MG 1.71 08/12/2024       Lipid Profile:  No results found for: \"CHLPL\", \"TRIG\", \"HDL\", \"LDLCALC\", \"LDLDIRECT\"    TSH:  No results found for: \"TSH\"    BNP:   Lab Results   Component Value Date    BNP 2,457 (H) 08/07/2024        PT/INR:  No results found for: \"PROTIME\", \"INR\"    HgBA1c:    Lab Results   Component Value Date    HGBA1C 5.5 01/08/2023       CBC:  Lab Results   Component Value Date    WBC 8.3 08/12/2024    WBC 9.0 08/11/2024    WBC 11.3 08/10/2024    RBC 3.57 (L) 08/12/2024    RBC 3.53 (L) 08/11/2024    RBC 3.59 (L) 08/10/2024    HGB 10.6 (L) 08/12/2024 " "   HGB 10.4 (L) 08/11/2024    HGB 11.0 (L) 08/10/2024    HCT 34.7 (L) 08/12/2024    HCT 34.0 (L) 08/11/2024    HCT 35.0 (L) 08/10/2024    MCV 97 08/12/2024    MCV 96 08/11/2024    MCV 98 08/10/2024    MCH 29.7 08/12/2024    MCH 29.5 08/11/2024    MCH 30.6 08/10/2024    MCHC 30.5 (L) 08/12/2024    MCHC 30.6 (L) 08/11/2024    MCHC 31.4 (L) 08/10/2024    RDW 16.0 (H) 08/12/2024    RDW 16.1 (H) 08/11/2024    RDW 15.9 (H) 08/10/2024     08/12/2024     08/11/2024     08/10/2024       BMP:  Lab Results   Component Value Date     (L) 08/12/2024     (L) 08/11/2024     (L) 08/10/2024    K 4.0 08/12/2024    K 4.7 08/11/2024    K 5.2 08/10/2024    CL 83 (L) 08/12/2024    CL 84 (L) 08/11/2024    CL 85 (L) 08/10/2024    CO2 44 (HH) 08/12/2024    CO2 37 (H) 08/11/2024    CO2 37 (H) 08/10/2024    BUN 46 (H) 08/12/2024    BUN 53 (H) 08/11/2024    BUN 50 (H) 08/10/2024    CREATININE 1.00 08/12/2024    CREATININE 1.37 (H) 08/11/2024    CREATININE 1.53 (H) 08/10/2024       Hepatic Function Panel:  No results found for: \"ALKPHOS\", \"ALT\", \"AST\", \"PROT\", \"BILITOT\", \"BILIDIR\"      DIAGNOSTIC TESTING RESULTS     XR chest 1 view    Result Date: 8/7/2024  Interpreted By:  Dom Beth, STUDY: XR CHEST 1 VIEW; 8/7/2024 6:06 pm   INDICATION: Signs/Symptoms:SOB.   COMPARISON: 08/07/2024   ACCESSION NUMBER(S): [id number]   ORDERING CLINICIAN: KERI WHITMAN   TECHNIQUE: 1 view of the chest was performed.   FINDINGS: No significant interval change. Small bibasilar airspace opacities which could reflect small effusions and atelectatic change. Congestive changes and mild prominence of the pulmonary vasculature and interstitium suggest mild pulmonary edema.. No pneumothorax.  The cardiomediastinal silhouette is within normal limits.       No significant interval change suggestion of small bilateral pleural effusions, congestive changes, and probable mild pulmonary edema.   Signed by: Dom Beth " 8/7/2024 6:21 PM Dictation workstation:   [id number]    ECG 12 lead    Result Date: 8/7/2024  uncertain rhythm, AF versus SR with PACs artifact affecting tracing Rightward axis Prolonged QT When compared with ECG of 18-JUN-2024 04:42, rhythm more irregular Reconfirmed by Rui Alvarado ([id number]) on 8/7/2024 4:51:35 PM    XR chest 1 view    Result Date: 8/7/2024  STUDY: Chest Radiograph;  08/07/2024 2:23 PM INDICATION: Shortness of breath. COMPARISON: 06/14/2024 XR Chest. 01/12/2023 XR Chest. ACCESSION NUMBER(S): [id number] ORDERING CLINICIAN: ABRAHAM CAMPBELL TECHNIQUE:  Frontal chest was obtained at 14:23 hours. FINDINGS: CARDIOMEDIASTINAL SILHOUETTE: Heart size is enlarged and the mediastinal contours appear stable. There is prominence of the aortic arch.  There is elevation of the right hemidiaphragm which appears stable.   LUNGS: There are basilar opacities and bilateral pleural effusions greater on the right.  The appearance of the chest is unchanged. There is pulmonary vascular congestion without pneumothorax.  ABDOMEN: No remarkable upper abdominal findings.  BONES: No acute osseous changes.    Stable appearance of the chest with cardiomegaly and pulmonary vascular congestion/pulmonary edema. Signed by Alex Fletcher,     CT head wo IV contrast    Result Date: 8/7/2024  Interpreted By:  Isiah Do, STUDY: CT HEAD WO IV CONTRAST;  8/7/2024 2:54 pm   INDICATION: Signs/Symptoms:dizziness.   COMPARISON: 01/08/2023   ACCESSION NUMBER(S): [id number]   ORDERING CLINICIAN: ABRAHAM CAMPBELL   TECHNIQUE: Noncontrast axial CT scan of head was performed. Angled reformats in brain and bone windows were generated. The images were reviewed in bone, brain, blood and soft tissue windows.   FINDINGS: CSF Spaces: Moderate brain atrophy evidence by prominence of ventricles, sulci and cisterns. There is no extraaxial fluid collection.   Parenchyma: Confluent areas of subcortical and periventricular white matter  changes which given patient's age are suggestive of chronic small vessel ischemic disease. Focal encephalomalacia change involving inferior lobe cortex of the right cerebellum suggestive of remote infarction. Findings similar to previous exam from 01/08/2023. The grey-white differentiation is intact. There is no mass effect or midline shift.  There is no intracranial hemorrhage.   Calvarium: The calvarium is unremarkable.   Paranasal sinuses and mastoids: Visualized paranasal sinuses and mastoids are clear.       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 No evidence of acute cortical infarct or intracranial hemorrhage. If there is persistent clinical concern for acute cortical infarction, MRI with diffusion-weighted images is a better means for further evaluation as clinically warranted.   MACRO: None   Signed by: Isiah Do 8/7/2024 3:06 PM Dictation workstation:   [id number]         RADIOLOGY     XR chest 1 view   Final Result   No significant interval change suggestion of small bilateral pleural   effusions, congestive changes, and probable mild pulmonary edema.        Signed by: Dom Beth 8/7/2024 6:21 PM   Dictation workstation:   [id number]      CT head wo IV contrast   Final Result   Brain atrophy and findings related to remote infarction involving   right cerebellar hemisphere. No evidence of acute cortical infarct or   intracranial hemorrhage. If there is persistent clinical concern for   acute cortical infarction, MRI with diffusion-weighted images is a   better means for further evaluation as clinically warranted.        MACRO:   None        Signed by: Isiah Do 8/7/2024 3:06 PM   Dictation workstation:   [id number]      XR chest 1 view   Final Result   Stable appearance of the chest with cardiomegaly and pulmonary   vascular congestion/pulmonary edema.   Signed by Alex Fletcher DO            ASSESSMENT     Problem List Items  Addressed This Visit       Hyponatremia - Primary     Other Visit Diagnoses       Acute on chronic heart failure, unspecified heart failure type (Multi)        Relevant Medications    metoprolol succinate XL (Toprol-XL) 24 hr tablet 25 mg    isosorbide mononitrate ER (Imdur) 24 hr tablet 30 mg    Urinary tract infection without hematuria, site unspecified        Acute combined systolic and diastolic congestive heart failure (Multi)        Relevant Medications    metoprolol succinate XL (Toprol-XL) 24 hr tablet 25 mg    isosorbide mononitrate ER (Imdur) 24 hr tablet 30 mg    Acute pulmonary edema (Multi)        Pulmonary hypertension (Multi)        Acute exacerbation of chronic obstructive pulmonary disease (COPD) (Multi)        Hypomagnesemia        Paroxysmal atrial fibrillation (Multi)        Relevant Medications    metoprolol succinate XL (Toprol-XL) 24 hr tablet 25 mg    isosorbide mononitrate ER (Imdur) 24 hr tablet 30 mg            Patient Active Problem List   Diagnosis    Coronary artery disease involving native coronary artery of native heart without angina pectoris    Chronic systolic heart failure (Multi)    Nicotine dependence, uncomplicated    Nonrheumatic aortic valve stenosis    Shortness of breath    Acute on chronic combined systolic (congestive) and diastolic (congestive) heart failure (Multi)    Hyponatremia    Hypervolemia    Acute on chronic hypoxic respiratory failure (Multi)    COPD (chronic obstructive pulmonary disease) (Multi)    Abnormal urinalysis       PLAN   CHF - predominantly diastolic with fluctuating EF. Responding to conservative therapy. Sodium improving and tolerating oral regimen with ongoing diuresis. Would continue same. As patient is refusing rehab or placement, would diurese further and prepare for DC with [facility] once power back on to his establishment/apartment.   COPD - per primary  Hyponatremia - improving.       Please do not hesitate to call with questions.  Electronically  signed by Denisse Payne MD, on 8/12/2024 at 5:51 PM
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
## 2024-08-13 LAB
ANION GAP SERPL CALC-SCNC: 9 MMOL/L (ref 10–20)
BUN SERPL-MCNC: 41 MG/DL (ref 6–23)
CALCIUM SERPL-MCNC: 8.6 MG/DL (ref 8.6–10.3)
CHLORIDE SERPL-SCNC: 81 MMOL/L (ref 98–107)
CO2 SERPL-SCNC: 44 MMOL/L (ref 21–32)
CREAT SERPL-MCNC: 1 MG/DL (ref 0.5–1.3)
EGFRCR SERPLBLD CKD-EPI 2021: 78 ML/MIN/1.73M*2
ERYTHROCYTE [DISTWIDTH] IN BLOOD BY AUTOMATED COUNT: 16.1 % (ref 11.5–14.5)
GLUCOSE BLD MANUAL STRIP-MCNC: 105 MG/DL (ref 74–99)
GLUCOSE BLD MANUAL STRIP-MCNC: 107 MG/DL (ref 74–99)
GLUCOSE BLD MANUAL STRIP-MCNC: 114 MG/DL (ref 74–99)
GLUCOSE SERPL-MCNC: 100 MG/DL (ref 74–99)
HCT VFR BLD AUTO: 33.3 % (ref 41–52)
HGB BLD-MCNC: 10.4 G/DL (ref 13.5–17.5)
MAGNESIUM SERPL-MCNC: 1.66 MG/DL (ref 1.6–2.4)
MCH RBC QN AUTO: 30 PG (ref 26–34)
MCHC RBC AUTO-ENTMCNC: 31.2 G/DL (ref 32–36)
MCV RBC AUTO: 96 FL (ref 80–100)
NRBC BLD-RTO: 0 /100 WBCS (ref 0–0)
PLATELET # BLD AUTO: 174 X10*3/UL (ref 150–450)
POTASSIUM SERPL-SCNC: 4.2 MMOL/L (ref 3.5–5.3)
RBC # BLD AUTO: 3.47 X10*6/UL (ref 4.5–5.9)
SODIUM SERPL-SCNC: 130 MMOL/L (ref 136–145)
WBC # BLD AUTO: 7.4 X10*3/UL (ref 4.4–11.3)

## 2024-08-13 PROCEDURE — 2500000001 HC RX 250 WO HCPCS SELF ADMINISTERED DRUGS (ALT 637 FOR MEDICARE OP)

## 2024-08-13 PROCEDURE — 2500000005 HC RX 250 GENERAL PHARMACY W/O HCPCS: Performed by: INTERNAL MEDICINE

## 2024-08-13 PROCEDURE — 99232 SBSQ HOSP IP/OBS MODERATE 35: CPT | Performed by: INTERNAL MEDICINE

## 2024-08-13 PROCEDURE — 1200000002 HC GENERAL ROOM WITH TELEMETRY DAILY

## 2024-08-13 PROCEDURE — 2500000004 HC RX 250 GENERAL PHARMACY W/ HCPCS (ALT 636 FOR OP/ED): Performed by: NURSE PRACTITIONER

## 2024-08-13 PROCEDURE — S4991 NICOTINE PATCH NONLEGEND: HCPCS | Performed by: NURSE PRACTITIONER

## 2024-08-13 PROCEDURE — 94640 AIRWAY INHALATION TREATMENT: CPT

## 2024-08-13 PROCEDURE — 85027 COMPLETE CBC AUTOMATED: CPT | Performed by: NURSE PRACTITIONER

## 2024-08-13 PROCEDURE — 2500000001 HC RX 250 WO HCPCS SELF ADMINISTERED DRUGS (ALT 637 FOR MEDICARE OP): Performed by: NURSE PRACTITIONER

## 2024-08-13 PROCEDURE — 2500000005 HC RX 250 GENERAL PHARMACY W/O HCPCS: Performed by: NURSE PRACTITIONER

## 2024-08-13 PROCEDURE — 2500000001 HC RX 250 WO HCPCS SELF ADMINISTERED DRUGS (ALT 637 FOR MEDICARE OP): Performed by: INTERNAL MEDICINE

## 2024-08-13 PROCEDURE — 97535 SELF CARE MNGMENT TRAINING: CPT | Mod: GO,CO

## 2024-08-13 PROCEDURE — 82947 ASSAY GLUCOSE BLOOD QUANT: CPT

## 2024-08-13 PROCEDURE — 83735 ASSAY OF MAGNESIUM: CPT | Performed by: NURSE PRACTITIONER

## 2024-08-13 PROCEDURE — 36415 COLL VENOUS BLD VENIPUNCTURE: CPT | Performed by: NURSE PRACTITIONER

## 2024-08-13 PROCEDURE — 2500000002 HC RX 250 W HCPCS SELF ADMINISTERED DRUGS (ALT 637 FOR MEDICARE OP, ALT 636 FOR OP/ED)

## 2024-08-13 PROCEDURE — 2500000002 HC RX 250 W HCPCS SELF ADMINISTERED DRUGS (ALT 637 FOR MEDICARE OP, ALT 636 FOR OP/ED): Performed by: NURSE PRACTITIONER

## 2024-08-13 PROCEDURE — 97110 THERAPEUTIC EXERCISES: CPT | Mod: GP,CQ

## 2024-08-13 PROCEDURE — 97530 THERAPEUTIC ACTIVITIES: CPT | Mod: GP,CQ

## 2024-08-13 PROCEDURE — 2500000002 HC RX 250 W HCPCS SELF ADMINISTERED DRUGS (ALT 637 FOR MEDICARE OP, ALT 636 FOR OP/ED): Performed by: INTERNAL MEDICINE

## 2024-08-13 PROCEDURE — 80048 BASIC METABOLIC PNL TOTAL CA: CPT | Performed by: NURSE PRACTITIONER

## 2024-08-13 RX ORDER — LANOLIN ALCOHOL/MO/W.PET/CERES
800 CREAM (GRAM) TOPICAL ONCE
Status: COMPLETED | OUTPATIENT
Start: 2024-08-13 | End: 2024-08-13

## 2024-08-13 ASSESSMENT — COGNITIVE AND FUNCTIONAL STATUS - GENERAL
PERSONAL GROOMING: A LITTLE
DRESSING REGULAR UPPER BODY CLOTHING: A LITTLE
STANDING UP FROM CHAIR USING ARMS: A LITTLE
CLIMB 3 TO 5 STEPS WITH RAILING: TOTAL
DRESSING REGULAR UPPER BODY CLOTHING: A LITTLE
CLIMB 3 TO 5 STEPS WITH RAILING: TOTAL
MOBILITY SCORE: 16
PERSONAL GROOMING: A LITTLE
DAILY ACTIVITIY SCORE: 16
DRESSING REGULAR LOWER BODY CLOTHING: A LOT
DRESSING REGULAR LOWER BODY CLOTHING: A LOT
MOVING TO AND FROM BED TO CHAIR: A LITTLE
MOBILITY SCORE: 16
MOVING TO AND FROM BED TO CHAIR: A LITTLE
DAILY ACTIVITIY SCORE: 16
MOVING FROM LYING ON BACK TO SITTING ON SIDE OF FLAT BED WITH BEDRAILS: A LITTLE
TURNING FROM BACK TO SIDE WHILE IN FLAT BAD: A LITTLE
WALKING IN HOSPITAL ROOM: A LITTLE
HELP NEEDED FOR BATHING: A LOT
MOVING FROM LYING ON BACK TO SITTING ON SIDE OF FLAT BED WITH BEDRAILS: A LITTLE
TURNING FROM BACK TO SIDE WHILE IN FLAT BAD: A LITTLE
STANDING UP FROM CHAIR USING ARMS: A LITTLE
TOILETING: A LOT
HELP NEEDED FOR BATHING: A LOT
WALKING IN HOSPITAL ROOM: A LITTLE
TOILETING: A LOT

## 2024-08-13 ASSESSMENT — LIFESTYLE VARIABLES
ANXIETY: NO ANXIETY, AT EASE
HEADACHE, FULLNESS IN HEAD: NOT PRESENT
VISUAL DISTURBANCES: NOT PRESENT
AGITATION: NORMAL ACTIVITY
VISUAL DISTURBANCES: NOT PRESENT
PAROXYSMAL SWEATS: NO SWEAT VISIBLE
PAROXYSMAL SWEATS: NO SWEAT VISIBLE
AGITATION: NORMAL ACTIVITY
AUDITORY DISTURBANCES: NOT PRESENT
TOTAL SCORE: 0
VISUAL DISTURBANCES: NOT PRESENT
HEADACHE, FULLNESS IN HEAD: NOT PRESENT
TREMOR: NO TREMOR
HEADACHE, FULLNESS IN HEAD: NOT PRESENT
NAUSEA AND VOMITING: NO NAUSEA AND NO VOMITING
ORIENTATION AND CLOUDING OF SENSORIUM: ORIENTED AND CAN DO SERIAL ADDITIONS
TREMOR: NO TREMOR
PAROXYSMAL SWEATS: NO SWEAT VISIBLE
TOTAL SCORE: 0
TREMOR: NO TREMOR
ORIENTATION AND CLOUDING OF SENSORIUM: ORIENTED AND CAN DO SERIAL ADDITIONS
NAUSEA AND VOMITING: NO NAUSEA AND NO VOMITING
ANXIETY: NO ANXIETY, AT EASE
ANXIETY: NO ANXIETY, AT EASE
NAUSEA AND VOMITING: NO NAUSEA AND NO VOMITING
AUDITORY DISTURBANCES: NOT PRESENT
TOTAL SCORE: 0
AUDITORY DISTURBANCES: NOT PRESENT
ORIENTATION AND CLOUDING OF SENSORIUM: ORIENTED AND CAN DO SERIAL ADDITIONS
AGITATION: NORMAL ACTIVITY

## 2024-08-13 ASSESSMENT — PAIN SCALES - GENERAL
PAINLEVEL_OUTOF10: 0 - NO PAIN

## 2024-08-13 ASSESSMENT — PAIN - FUNCTIONAL ASSESSMENT
PAIN_FUNCTIONAL_ASSESSMENT: 0-10

## 2024-08-13 ASSESSMENT — ACTIVITIES OF DAILY LIVING (ADL)
HOME_MANAGEMENT_TIME_ENTRY: 25
HOME_MANAGEMENT_TIME_ENTRY: 42
EFFECT OF PAIN ON DAILY ACTIVITIES: .

## 2024-08-13 NOTE — PROGRESS NOTES
Physical Therapy    Physical Therapy    Physical Therapy Treatment    Patient Name: Milton Lane  MRN: [id number]  Today's Date: 8/13/2024  Time Calculation  Start Time: 0950  Stop Time: 1014  Time Calculation (min): 24 min     2101/2101-A     08/13/24 0950   PT  Visit   PT Received On 08/13/24   Response to Previous Treatment Patient with no complaints from previous session.   General   Reason for Referral ADL's   Referred By Rishi Anthony MD   Past Medical History Relevant to Rehab cardiomyopathy (EF 35%), diastolic dysfunction, mild aortic stenosis, COPD on home O2 (ranges from 2-4 L), a-fib (no A-C), CAD s/p PCI/stent, bipolar dz, alcoholic cadiomyopathy.   Prior to Session Communication Bedside nurse   Patient Position Received Up in chair;Alarm on   General Comment Pt agreeable to therapy and cleared for participation. Very [place]   Precautions   Medical Precautions Fall precautions   Vital Signs   SpO2 93 %  (4L)   Pain Assessment   Pain Assessment 0-10   0-10 (Numeric) Pain Score 0 - No pain   Effect of Pain on Daily Activities .   Cognition   Overall Cognitive Status WFL   Therapeutic Exercise   Therapeutic Exercise Performed Yes   Therapeutic Exercise Activity 1 AP/LAQ/marching 2x10  (encouragement required, cues to stay on task.)   Balance/Neuromuscular Re-Education   Balance/Neuromuscular Re-Education Activity Performed Yes   Balance/Neuromuscular Re-Education Activity 1 static and dynamic standing balance trials up to 1 minute. B UE support, CGA with chair behind pt for increased safety d/t weakness.   Ambulation/Gait Training   Ambulation/Gait Training Performed Yes   Ambulation/Gait Training 1   Surface 1 Level tile   Device 1 Rolling walker   Gait Support Devices Gait belt   Assistance 1 Minimum assistance;Minimal verbal cues   Quality of Gait 1 NBOS;Diminished heel strike   Comments/Distance (ft) 1 10'x2 decreased liang, cues to stay close to WW for increased safety and stability, fair follow  through. Pt became dizzy requiring seated rest break. Unable to continue ambulation d/t diziness.   Transfers   Transfer Yes   Transfer 1   Transfer From 1 Chair with arms to   Transfer to 1 Stand;Sit   Technique 1 Sit to stand;Stand to sit   Transfer Device 1 Walker;Gait belt   Transfer Level of Assistance 1 Contact guard   Trials/Comments 1 x5 cues for safe UE placement and sequencing for increased safety. Fair follow through with repeated cues d/t hearing loss.   Activity Tolerance   Endurance Tolerates 10 - 20 min exercise with multiple rests   PT Assessment   PT Assessment Results Decreased strength;Decreased endurance;Impaired balance;Decreased mobility   Rehab Prognosis Good   End of Session Communication Bedside nurse   Assessment Comment Pt fatigues quickly requiring seated rest breaks between activities. Pt is easily distracted requiring mod cues to stay on task. Limited ambulation traisl d/t increased dizziness this session.   End of Session Patient Position Up in chair       Outcome Measures:  [facility] Basic Mobility  Turning from your back to your side while in a flat bed without using bedrails: A little  Moving from lying on your back to sitting on the side of a flat bed without using bedrails: A little  Moving to and from bed to chair (including a wheelchair): A little  Standing up from a chair using your arms (e.g. wheelchair or bedside chair): A little  To walk in hospital room: A little  Climbing 3-5 steps with railing: Total  Basic Mobility - Total Score: 16                             EDUCATION:  Outpatient Education  Individual(s) Educated: Patient  Education Provided: Fall Risk  Education Documentation  Precautions, taught by Sabine Degroot PTA at 8/13/2024 10:24 AM.  Learner: Patient  Readiness: Acceptance  Method: Explanation, Demonstration  Response: Verbalizes Understanding, Demonstrated Understanding, Needs Reinforcement    Body Mechanics, taught by Sabine Degroot PTA at 8/13/2024 10:24  AM.  Learner: Patient  Readiness: Acceptance  Method: Explanation, Demonstration  Response: Verbalizes Understanding, Demonstrated Understanding, Needs Reinforcement    Home Exercise Program, taught by Sabine Degroot PTA at 8/13/2024 10:24 AM.  Learner: Patient  Readiness: Acceptance  Method: Explanation, Demonstration  Response: Verbalizes Understanding, Demonstrated Understanding, Needs Reinforcement    Mobility Training, taught by Sabine Degroot PTA at 8/13/2024 10:24 AM.  Learner: Patient  Readiness: Acceptance  Method: Explanation, Demonstration  Response: Verbalizes Understanding, Demonstrated Understanding, Needs Reinforcement    Education Comments  No comments found.        GOALS:  Encounter Problems       Encounter Problems (Active)       PT Problem       Balance       Start:  08/08/24    Expected End:  08/22/24       Pt will improve static/dynamic balance to Good in order to improve safety with functional tasks.           Bed mobility       Start:  08/08/24    Expected End:  08/22/24       Pt transfer sup<>sit with IND           Transfers       Start:  08/08/24    Expected End:  08/22/24       Pt will transfer sit<>stand with mod I.           Gait       Start:  08/08/24    Expected End:  08/22/24       Pt will AMB 75 Feet with RW And mod I.           Endurance       Start:  08/08/24    Expected End:  08/22/24       Pt will tolerate at least 5 minutes OOB activity without SOB or reports of fatigue

## 2024-08-13 NOTE — PROGRESS NOTES
Milton Lane is a 74 y.o. male on day 6 of admission presenting with Acute on chronic hypoxic respiratory failure (Multi).    Subjective   C/o infection in penile area       Objective         Current Facility-Administered Medications:     acetaminophen (Tylenol) tablet 650 mg, 650 mg, oral, q4h PRN **OR** acetaminophen (Tylenol) oral liquid 650 mg, 650 mg, oral, q4h PRN **OR** acetaminophen (Tylenol) suppository 650 mg, 650 mg, rectal, q4h PRN, DAVID Holden-CNP    albuterol 2.5 mg /3 mL (0.083 %) nebulizer solution 2.5 mg, 2.5 mg, nebulization, q2h PRN, Rishi Anthony MD    amiodarone (Pacerone) tablet 200 mg, 200 mg, oral, Nightly, Angela Grover MD, 200 mg at 08/12/24 2132    aspirin EC tablet 81 mg, 81 mg, oral, Daily, DAVID Holden-CNP, 81 mg at 08/13/24 1030    atorvastatin (Lipitor) tablet 20 mg, 20 mg, oral, Daily, DAVID Holden-CNP, 20 mg at 08/12/24 2132    budesonide (Pulmicort) 0.5 mg/2 mL nebulizer solution 0.5 mg, 0.5 mg, nebulization, BID, DAVID Holden-CNP, 0.5 mg at 08/13/24 0927    bumetanide (Bumex) tablet 3 mg, 3 mg, oral, BID, Sofiya Greenwood MD, 3 mg at 08/13/24 0800    dextrose 50 % injection 12.5 g, 12.5 g, intravenous, q15 min PRN, DAIVD Holden-CNP    dextrose 50 % injection 25 g, 25 g, intravenous, q15 min PRN, DAVID Holden-CNP    empagliflozin (Jardiance) tablet 12.5 mg, 12.5 mg, oral, Daily, DAVID Holden-CNP, 12.5 mg at 08/13/24 1030    enoxaparin (Lovenox) syringe 40 mg, 40 mg, subcutaneous, q24h, DEBBIE Holden, 40 mg at 08/12/24 2131    fluticasone (Flonase) nasal spray 2 spray, 2 spray, Each Nostril, Daily, DEBBIE Holden, 2 spray at 08/12/24 0842    folic acid (Folvite) tablet 1 mg, 1 mg, oral, Daily, DEBBIE Holden, 1 mg at 08/13/24 1030    formoterol (Perforomist) 20 mcg/2 mL nebulizer solution 20 mcg, 20 mcg, nebulization, BID, DEBBIE Holden, 20 mcg at 08/13/24  0927    glucagon (Glucagen) injection 1 mg, 1 mg, intramuscular, q15 min PRN, DEBBIE Holden    glucagon (Glucagen) injection 1 mg, 1 mg, intramuscular, q15 min PRN, DEBBIE Holden    insulin lispro (HumaLOG) injection 0-5 Units, 0-5 Units, subcutaneous, 4x daily, DEBBIE Holden, 1 Units at 08/12/24 2131    ipratropium-albuteroL (Duo-Neb) 0.5-2.5 mg/3 mL nebulizer solution 3 mL, 3 mL, nebulization, 4x daily, Rishi Anthony MD, 3 mL at 08/13/24 0927    isosorbide mononitrate ER (Imdur) 24 hr tablet 30 mg, 30 mg, oral, Nightly, DEBBIE Pompa, 30 mg at 08/12/24 2132    lamoTRIgine (LaMICtal) tablet 100 mg, 100 mg, oral, Nightly, Angela Grover MD, 100 mg at 08/12/24 2132    lidocaine 4 % patch 1 patch, 1 patch, transdermal, Daily, DEBBIE Pompa, 1 patch at 08/13/24 1028    LORazepam (Ativan) injection 0.5 mg, 0.5 mg, intravenous, q2h PRN **OR** LORazepam (Ativan) injection 1 mg, 1 mg, intravenous, q2h PRN **OR** LORazepam (Ativan) injection 2 mg, 2 mg, intravenous, q2h PRN, DEBBIE Holden    melatonin tablet 3 mg, 3 mg, oral, Nightly PRN, DEBBIE Holden    metoprolol succinate XL (Toprol-XL) 24 hr tablet 25 mg, 25 mg, oral, Daily, DEBBIE Pompa, 25 mg at 08/13/24 1030    multivitamin with minerals 1 tablet, 1 tablet, oral, Daily, DEBBIE Holden, 1 tablet at 08/13/24 1030    nicotine (Nicoderm CQ) 7 mg/24 hr patch 1 patch, 1 patch, transdermal, q24h, DEBBIE Holden, 1 patch at 08/13/24 1030    oxygen (O2) therapy, , inhalation, Continuous PRN - O2/gases, Rishi Anthony MD    oxygen (O2) therapy, , inhalation, Continuous PRN - O2/gases, Rishi Anthony MD, 4.5 L/min at 08/13/24 0927    polyethylene glycol (Glycolax, Miralax) packet 17 g, 17 g, oral, Daily, DEBBIE Holden    [Held by provider] spironolactone (Aldactone) tablet 12.5 mg, 12.5 mg, oral, BID, DEBBIE Holden     "thiamine (Vitamin B-1) tablet 100 mg, 100 mg, oral, Daily, Rafaela Khan, APRN-CNP, 100 mg at 08/13/24 0900    traZODone (Desyrel) tablet 50 mg, 50 mg, oral, Nightly, Stacey Hernandez, APRN-CNP, 50 mg at 08/12/24 2132    urea (Ure-Na) oral powder 15 g, 15 g, oral, BID, Sofiya Greenwood MD, 15 g at 08/12/24 2131       Physical Exam  HENT:      Head: Normocephalic.   Eyes:      Conjunctiva/sclera: Conjunctivae normal.   Cardiovascular:      Rate and Rhythm: Regular rhythm.   Pulmonary:      Breath sounds: Normal breath sounds.   Abdominal:      General: Bowel sounds are normal.      Palpations: Abdomen is soft.   Musculoskeletal:         General: Normal range of motion.   Skin:     General: Skin is warm and dry.   Neurological:      General: No focal deficit present.      Mental Status: He is alert.   Psychiatric:         Behavior: Behavior normal.           Last Recorded Vitals  Blood pressure 114/53, pulse 86, temperature 36.5 °C (97.7 °F), temperature source Temporal, resp. rate (!) 27, height 1.753 m (5' 9.02\"), weight 94 kg (207 lb 3.7 oz), SpO2 93%.  Intake/Output last 3 Shifts:  I/O last 3 completed shifts:  In: 960 (10.2 mL/kg) [P.O.:960]  Out: 3700 (39.4 mL/kg) [Urine:3700 (1.1 mL/kg/hr)]  Weight: 94 kg     Labs:       Results for orders placed or performed during the hospital encounter of 08/07/24 (from the past 24 hour(s))   POCT GLUCOSE   Result Value Ref Range    POCT Glucose 140 (H) 74 - 99 mg/dL   POCT GLUCOSE   Result Value Ref Range    POCT Glucose 116 (H) 74 - 99 mg/dL   POCT GLUCOSE   Result Value Ref Range    POCT Glucose 159 (H) 74 - 99 mg/dL   CBC   Result Value Ref Range    WBC 7.4 4.4 - 11.3 x10*3/uL    nRBC 0.0 0.0 - 0.0 /100 WBCs    RBC 3.47 (L) 4.50 - 5.90 x10*6/uL    Hemoglobin 10.4 (L) 13.5 - 17.5 g/dL    Hematocrit 33.3 (L) 41.0 - 52.0 %    MCV 96 80 - 100 fL    MCH 30.0 26.0 - 34.0 pg    MCHC 31.2 (L) 32.0 - 36.0 g/dL    RDW 16.1 (H) 11.5 - 14.5 %    Platelets 174 150 - 450 x10*3/uL "   Basic metabolic panel   Result Value Ref Range    Glucose 100 (H) 74 - 99 mg/dL    Sodium 130 (L) 136 - 145 mmol/L    Potassium 4.2 3.5 - 5.3 mmol/L    Chloride 81 (L) 98 - 107 mmol/L    Bicarbonate 44 (HH) 21 - 32 mmol/L    Anion Gap 9 (L) 10 - 20 mmol/L    Urea Nitrogen 41 (H) 6 - 23 mg/dL    Creatinine 1.00 0.50 - 1.30 mg/dL    eGFR 78 >60 mL/min/1.73m*2    Calcium 8.6 8.6 - 10.3 mg/dL   Magnesium   Result Value Ref Range    Magnesium 1.66 1.60 - 2.40 mg/dL   POCT GLUCOSE   Result Value Ref Range    POCT Glucose 105 (H) 74 - 99 mg/dL   POCT GLUCOSE   Result Value Ref Range    POCT Glucose 107 (H) 74 - 99 mg/dL              Assessment/Plan   Principal Problem:    Acute on chronic hypoxic respiratory failure (Multi)  Active Problems:    Acute on chronic combined systolic (congestive) and diastolic (congestive) heart failure (Multi)    Hyponatremia    Hypervolemia    COPD (chronic obstructive pulmonary disease) (Multi)    Abnormal urinalysis        PLAN: pt is c/o pain, swelling in penile area, possibly due to external catheter,may need antibiotic, respiratory status seems to be improving, c/w Bumex, monitor BMP,med list and labs reviewed for now c/w current Rx, if stale consider for dc in 1-2 days.                Rishi Anthony MD

## 2024-08-13 NOTE — PROGRESS NOTES
08/13/24 1013   Discharge Planning   Who is requesting discharge planning? Provider   Home or Post Acute Services Post acute facilities (Rehab/SNF/etc)   Type of Post Acute Facility Services Skilled nursing   Expected Discharge Disposition SNF   Does the patient need discharge transport arranged? Yes   RoundTrip coordination needed? Yes   Has discharge transport been arranged? No     Met with Loan DOUGLAS outside of patient's room and she stated that patient was agreeable to going to a SNF on discharge. She stated that she would like to be kept informed on where patient is going and gave her contact number [phone] because she will be out of town for a few days. Did also speak with patient to confirm he is agreeable to going to facility and patient agreed. Will be going to [facility] on discharge. This [facility] is also [facility] with [facility] so patient can use his VA benefits if needed. CT team will follow patient.

## 2024-08-13 NOTE — PROGRESS NOTES
Occupational Therapy    OT Treatment    Patient Name: Milton Lane  MRN: [id number]  Today's Date: 8/13/2024  Time Calculation  Start Time: 1435  Stop Time: 1500  Time Calculation (min): 25 min       2101/2101-A    Assessment:  End of Session Communication: Bedside nurse  End of Session Patient Position: Up in chair       Plan:  OT Frequency: 3 times per week  OT Discharge Recommendations: Moderate intensity level of continued care     Subjective        08/13/24 1435   OT Last Visit   OT Received On 08/13/24   General   Prior to Session Communication Bedside nurse   Patient Position Received Up in chair;Alarm on   Preferred Learning Style verbal;visual   General Comment Pt pleasant and cooperative, cleared for therapy.   Pain Assessment   Pain Assessment 0-10   0-10 (Numeric) Pain Score 0 - No pain   Cognition   Overall Cognitive Status WFL   Grooming   Grooming Level of Assistance Minimum assistance   Grooming Where Assessed Chair   Grooming Comments Pt fatigued from recent transfer, educated on energy conservation techniques such as seated ADLs.   Transfers   Transfer Yes   Transfer 1   Transfer From 1 Sit to   Transfer to 1 Stand   Technique 1 Sit to stand;Stand to sit   Transfer Device 1 Walker;Gait belt   Transfer Level of Assistance 1 Contact guard   Trials/Comments 1 Multiple sit<>stands to promote balance and strength.   Dynamic Sitting Balance   Dynamic Sitting-Balance Reaching for objects;Reaching across midline   IP OT Assessment   End of Session Communication Bedside nurse   End of Session Patient Position Up in chair   Inpatient Plan   OT Frequency 3 times per week   OT Discharge Recommendations Moderate intensity level of continued care       Outcome Measures:[facility] Daily Activity  Putting on and taking off regular lower body clothing: A lot  Bathing (including washing, rinsing, drying): A lot  Putting on and taking off regular upper body clothing: A little  Toileting, which includes using toilet, bedpan  or urinal: A lot  Taking care of personal grooming such as brushing teeth: A little  Eating Meals: None  Daily Activity - Total Score: 16  Education Documentation  Body Mechanics, taught by ASHLEY Amos at 8/13/2024  4:33 PM.  Learner: Patient  Readiness: Acceptance  Method: Demonstration  Response: Verbalizes Understanding, Demonstrated Understanding, Needs Reinforcement    Precautions, taught by ASHLEY Amos at 8/13/2024  4:33 PM.  Learner: Patient  Readiness: Acceptance  Method: Demonstration  Response: Verbalizes Understanding, Demonstrated Understanding, Needs Reinforcement    Body Mechanics, taught by ASHLEY Amos at 8/13/2024  4:10 PM.  Learner: Patient  Readiness: Acceptance  Method: Explanation, Demonstration  Response: Verbalizes Understanding, Demonstrated Understanding, Needs Reinforcement    Precautions, taught by ASHLEY Amos at 8/13/2024  4:10 PM.  Learner: Patient  Readiness: Acceptance  Method: Explanation, Demonstration  Response: Verbalizes Understanding, Demonstrated Understanding, Needs Reinforcement    Education Comments  No comments found.            Goals:  Encounter Problems       Encounter Problems (Active)       OT Goals       OT Goal 1 (Progressing)       Start:  08/08/24    Expected End:  08/22/24       Pt will complete ADL's and mobility with good stand balance            OT Goal 2 (Progressing)       Start:  08/08/24    Expected End:  08/22/24       Pt with complete all transfers safely with supervision            OT Goal 3 (Progressing)       Start:  08/08/24    Expected End:  08/22/24       Pt will complete UB dressing ADL's with supervision using adaptive device(s) as needed           OT Goal 4 (Progressing)       Start:  08/08/24    Expected End:  08/22/24       Pt will complete LB dressing ADL's with supervision using adaptive device(s) as needed           OT Goal 5 (Progressing)       Start:  08/08/24    Expected End:   08/22/24       Pt with complete grooming ADL's with supervision and good stand balance

## 2024-08-13 NOTE — CARE PLAN
The patient's goals for the shift include      The clinical goals for the shift include Pt will be hemodynamically stable throughout this shift      Problem: Skin  Goal: Prevent/minimize sheer/friction injuries  Outcome: Progressing  Flowsheets (Taken 8/13/2024 0509)  Prevent/minimize sheer/friction injuries:   Turn/reposition every 2 hours/use positioning/transfer devices   Use pull sheet     Problem: Pain  Goal: Turns in bed with improved pain control throughout the shift  Outcome: Progressing     Problem: Respiratory  Goal: Minimize anxiety/maximize coping throughout shift  Outcome: Progressing     Problem: Discharge Planning  Goal: Discharge to home or other facility with appropriate resources  Outcome: Progressing     Problem: Fall/Injury  Goal: Not fall by end of shift  Outcome: Progressing  Goal: Be free from injury by end of the shift  Outcome: Progressing  Goal: Verbalize understanding of personal risk factors for fall in the hospital  Outcome: Progressing  Goal: Verbalize understanding of risk factor reduction measures to prevent injury from fall in the home  Outcome: Progressing  Goal: Use assistive devices by end of the shift  Outcome: Progressing  Goal: Pace activities to prevent fatigue by end of the shift  Outcome: Progressing
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
## 2024-08-13 NOTE — PROGRESS NOTES
Nutrition Progress Note    Please see previous MNT note, 8/9, for details. Case discussed with TCC; plan for pt to discharge to SNF once medically appropriate and may need ECF placement pending SNF outcomes.     Met with pt at bedside--verifies plan for SNF. Pt with few questions regarding [facility] services; aware usual sodium restriction is currently liberalized due to hyponatremia (currently 130mmol/L; 121 on admit).  Meals on Wheels to be postponed at present time.   RECOMMENDATION    As medically appropriate, suggest diet as 2-3g Na.  Refer to SNF RD upon transfer.     TIME: 30 minutes

## 2024-08-13 NOTE — CARE PLAN
Problem: Skin  Goal: Prevent/minimize sheer/friction injuries  Outcome: Progressing  Flowsheets (Taken 8/13/2024 6988)  Prevent/minimize sheer/friction injuries: Complete micro-shifts as needed if patient unable. Adjust patient position to relieve pressure points, not a full turn     Problem: Pain  Goal: Turns in bed with improved pain control throughout the shift  Outcome: Progressing     Problem: Respiratory  Goal: Minimize anxiety/maximize coping throughout shift  Outcome: Progressing     Problem: Discharge Planning  Goal: Discharge to home or other facility with appropriate resources  Outcome: Progressing     Problem: Fall/Injury  Goal: Not fall by end of shift  Outcome: Progressing  Goal: Be free from injury by end of the shift  Outcome: Progressing  Goal: Verbalize understanding of personal risk factors for fall in the hospital  Outcome: Progressing  Goal: Verbalize understanding of risk factor reduction measures to prevent injury from fall in the home  Outcome: Progressing  Goal: Use assistive devices by end of the shift  Outcome: Progressing  Goal: Pace activities to prevent fatigue by end of the shift  Outcome: Progressing   The patient's goals for the shift include      The clinical goals for the shift include Patient be free from resp distress by end of shift.    Over the shift, the patient did not make progress toward the following goals. Barriers to progression include SOB with exertion. Recommendations to address these barriers include incentive spriometer.

## 2024-08-14 LAB
ANION GAP SERPL CALC-SCNC: 8 MMOL/L (ref 10–20)
BUN SERPL-MCNC: 31 MG/DL (ref 6–23)
CALCIUM SERPL-MCNC: 8.4 MG/DL (ref 8.6–10.3)
CHLORIDE SERPL-SCNC: 82 MMOL/L (ref 98–107)
CO2 SERPL-SCNC: 43 MMOL/L (ref 21–32)
CREAT SERPL-MCNC: 0.92 MG/DL (ref 0.5–1.3)
EGFRCR SERPLBLD CKD-EPI 2021: 86 ML/MIN/1.73M*2
ERYTHROCYTE [DISTWIDTH] IN BLOOD BY AUTOMATED COUNT: 16.3 % (ref 11.5–14.5)
GLUCOSE BLD MANUAL STRIP-MCNC: 94 MG/DL (ref 74–99)
GLUCOSE SERPL-MCNC: 135 MG/DL (ref 74–99)
HCT VFR BLD AUTO: 32.5 % (ref 41–52)
HGB BLD-MCNC: 10.1 G/DL (ref 13.5–17.5)
MAGNESIUM SERPL-MCNC: 1.72 MG/DL (ref 1.6–2.4)
MCH RBC QN AUTO: 29.7 PG (ref 26–34)
MCHC RBC AUTO-ENTMCNC: 31.1 G/DL (ref 32–36)
MCV RBC AUTO: 96 FL (ref 80–100)
NRBC BLD-RTO: 0 /100 WBCS (ref 0–0)
PLATELET # BLD AUTO: 167 X10*3/UL (ref 150–450)
POTASSIUM SERPL-SCNC: 4.3 MMOL/L (ref 3.5–5.3)
RBC # BLD AUTO: 3.4 X10*6/UL (ref 4.5–5.9)
SODIUM SERPL-SCNC: 129 MMOL/L (ref 136–145)
WBC # BLD AUTO: 10 X10*3/UL (ref 4.4–11.3)

## 2024-08-14 PROCEDURE — 2500000004 HC RX 250 GENERAL PHARMACY W/ HCPCS (ALT 636 FOR OP/ED): Performed by: NURSE PRACTITIONER

## 2024-08-14 PROCEDURE — 97116 GAIT TRAINING THERAPY: CPT | Mod: GP,CQ

## 2024-08-14 PROCEDURE — 2500000005 HC RX 250 GENERAL PHARMACY W/O HCPCS: Performed by: NURSE PRACTITIONER

## 2024-08-14 PROCEDURE — 2500000001 HC RX 250 WO HCPCS SELF ADMINISTERED DRUGS (ALT 637 FOR MEDICARE OP): Performed by: NURSE PRACTITIONER

## 2024-08-14 PROCEDURE — 94640 AIRWAY INHALATION TREATMENT: CPT

## 2024-08-14 PROCEDURE — 2500000001 HC RX 250 WO HCPCS SELF ADMINISTERED DRUGS (ALT 637 FOR MEDICARE OP): Performed by: INTERNAL MEDICINE

## 2024-08-14 PROCEDURE — 1200000002 HC GENERAL ROOM WITH TELEMETRY DAILY

## 2024-08-14 PROCEDURE — 2500000001 HC RX 250 WO HCPCS SELF ADMINISTERED DRUGS (ALT 637 FOR MEDICARE OP)

## 2024-08-14 PROCEDURE — 83735 ASSAY OF MAGNESIUM: CPT | Performed by: NURSE PRACTITIONER

## 2024-08-14 PROCEDURE — 85027 COMPLETE CBC AUTOMATED: CPT | Performed by: NURSE PRACTITIONER

## 2024-08-14 PROCEDURE — 36415 COLL VENOUS BLD VENIPUNCTURE: CPT | Performed by: NURSE PRACTITIONER

## 2024-08-14 PROCEDURE — 82374 ASSAY BLOOD CARBON DIOXIDE: CPT | Performed by: NURSE PRACTITIONER

## 2024-08-14 PROCEDURE — 82947 ASSAY GLUCOSE BLOOD QUANT: CPT

## 2024-08-14 PROCEDURE — 2500000005 HC RX 250 GENERAL PHARMACY W/O HCPCS: Performed by: INTERNAL MEDICINE

## 2024-08-14 PROCEDURE — 97110 THERAPEUTIC EXERCISES: CPT | Mod: GO,CO

## 2024-08-14 PROCEDURE — 2500000002 HC RX 250 W HCPCS SELF ADMINISTERED DRUGS (ALT 637 FOR MEDICARE OP, ALT 636 FOR OP/ED)

## 2024-08-14 PROCEDURE — 97530 THERAPEUTIC ACTIVITIES: CPT | Mod: GP,CQ

## 2024-08-14 PROCEDURE — 2500000002 HC RX 250 W HCPCS SELF ADMINISTERED DRUGS (ALT 637 FOR MEDICARE OP, ALT 636 FOR OP/ED): Performed by: NURSE PRACTITIONER

## 2024-08-14 PROCEDURE — S4991 NICOTINE PATCH NONLEGEND: HCPCS | Performed by: NURSE PRACTITIONER

## 2024-08-14 PROCEDURE — 2500000002 HC RX 250 W HCPCS SELF ADMINISTERED DRUGS (ALT 637 FOR MEDICARE OP, ALT 636 FOR OP/ED): Performed by: INTERNAL MEDICINE

## 2024-08-14 PROCEDURE — 97530 THERAPEUTIC ACTIVITIES: CPT | Mod: GO,CO

## 2024-08-14 RX ORDER — BUMETANIDE 1 MG/1
2 TABLET ORAL
Status: DISCONTINUED | OUTPATIENT
Start: 2024-08-14 | End: 2024-08-15 | Stop reason: HOSPADM

## 2024-08-14 RX ORDER — BUMETANIDE 2 MG/1
2 TABLET ORAL
Status: ON HOLD
Start: 2024-08-14 | End: 2024-09-13

## 2024-08-14 RX ORDER — GUAIFENESIN 100 MG/5ML
200 SOLUTION ORAL EVERY 6 HOURS PRN
Status: DISCONTINUED | OUTPATIENT
Start: 2024-08-14 | End: 2024-08-15 | Stop reason: HOSPADM

## 2024-08-14 RX ORDER — LANOLIN ALCOHOL/MO/W.PET/CERES
400 CREAM (GRAM) TOPICAL ONCE
Status: COMPLETED | OUTPATIENT
Start: 2024-08-14 | End: 2024-08-14

## 2024-08-14 ASSESSMENT — COGNITIVE AND FUNCTIONAL STATUS - GENERAL
WALKING IN HOSPITAL ROOM: A LITTLE
PERSONAL GROOMING: A LITTLE
PERSONAL GROOMING: A LITTLE
CLIMB 3 TO 5 STEPS WITH RAILING: A LOT
TURNING FROM BACK TO SIDE WHILE IN FLAT BAD: A LITTLE
HELP NEEDED FOR BATHING: A LOT
DRESSING REGULAR UPPER BODY CLOTHING: A LITTLE
STANDING UP FROM CHAIR USING ARMS: A LITTLE
MOVING TO AND FROM BED TO CHAIR: A LITTLE
STANDING UP FROM CHAIR USING ARMS: A LITTLE
STANDING UP FROM CHAIR USING ARMS: A LITTLE
DRESSING REGULAR LOWER BODY CLOTHING: A LOT
MOVING TO AND FROM BED TO CHAIR: A LITTLE
DAILY ACTIVITIY SCORE: 16
MOVING FROM LYING ON BACK TO SITTING ON SIDE OF FLAT BED WITH BEDRAILS: A LITTLE
HELP NEEDED FOR BATHING: A LITTLE
TOILETING: A LOT
TOILETING: A LITTLE
MOVING TO AND FROM BED TO CHAIR: A LITTLE
WALKING IN HOSPITAL ROOM: A LITTLE
TOILETING: A LOT
DRESSING REGULAR UPPER BODY CLOTHING: A LITTLE
DRESSING REGULAR LOWER BODY CLOTHING: A LITTLE
MOBILITY SCORE: 17
TURNING FROM BACK TO SIDE WHILE IN FLAT BAD: A LITTLE
MOVING FROM LYING ON BACK TO SITTING ON SIDE OF FLAT BED WITH BEDRAILS: A LITTLE
CLIMB 3 TO 5 STEPS WITH RAILING: A LOT
MOBILITY SCORE: 17
TURNING FROM BACK TO SIDE WHILE IN FLAT BAD: A LITTLE
CLIMB 3 TO 5 STEPS WITH RAILING: TOTAL
DRESSING REGULAR LOWER BODY CLOTHING: A LOT
MOVING FROM LYING ON BACK TO SITTING ON SIDE OF FLAT BED WITH BEDRAILS: A LITTLE
DAILY ACTIVITIY SCORE: 19
DRESSING REGULAR UPPER BODY CLOTHING: A LITTLE
PERSONAL GROOMING: A LITTLE
DAILY ACTIVITIY SCORE: 16
MOBILITY SCORE: 16
HELP NEEDED FOR BATHING: A LOT
WALKING IN HOSPITAL ROOM: A LITTLE

## 2024-08-14 ASSESSMENT — PAIN - FUNCTIONAL ASSESSMENT
PAIN_FUNCTIONAL_ASSESSMENT: 0-10

## 2024-08-14 ASSESSMENT — ACTIVITIES OF DAILY LIVING (ADL): EFFECT OF PAIN ON DAILY ACTIVITIES: .

## 2024-08-14 ASSESSMENT — PAIN SCALES - GENERAL
PAINLEVEL_OUTOF10: 3
PAINLEVEL_OUTOF10: 0 - NO PAIN

## 2024-08-14 NOTE — DISCHARGE INSTR - AVS FIRST PAGE
Heart Failure Discharge Instructions - SNF/ECF    1. Weigh patient daily and record.  2. If patient gains more than 2 or 3 pounds overnight or 5 pounds in 5 days, call the cardiologist Dr. Jo or facility provider.  3. Follow a low sodium diet. No more than 2000 mg in one day, or more than 700 mg per meal.  4. Limit total fluids to no more than 8 cups (or 2 liters) per day - this includes all fluids (water, coffee, juice, milk, tea, etc.)  5. Monitor blood pressure daily and record.  6. Be sure to have patient see cardiologist in one week after discharge. Call to schedule follow-up appointments.  7. Keep follow-up appointments. Send the weight and vital signs record with patient to the appointments so the doctors can see the weight trend and blood pressure readings for tighter heart failure management.  8. Activity as tolerated, advance based on tolerance.  9. If you notice subtle change of symptoms (slight increase in swelling, slight shortness of breath, a new intolerance to lying flat, a new cough), be sure to call the cardiologist or facility provider.

## 2024-08-14 NOTE — NURSING NOTE
SHIFT NOTE  2330 Patient arrived to [facility]. Vitals are stable.   Tele was placed, showing A-fib.   Pt urinated >2L this shift. Pt needed to  order to urinate. Pt is currently on bumex rather than lasix. Humidification was added to nasal cannula for dry nose.   Mucinex added for cough.  VS stable.   Bed alarm on.   Safety maintained.

## 2024-08-14 NOTE — PROGRESS NOTES
Occupational Therapy    OT Treatment    Patient Name: Milton Lane  MRN: [id number]  Today's Date: 8/14/2024  Time Calculation  Start Time: 1036  Stop Time: 1100  Time Calculation (min): 24 min       3005/3005-A    Assessment:  End of Session Communication: Bedside nurse  End of Session Patient Position: Up in chair, Alarm on       Plan:  OT Frequency: 3 times per week  OT Discharge Recommendations: Moderate intensity level of continued care     Subjective        08/14/24 1036   OT Last Visit   OT Received On 08/14/24   General   Prior to Session Communication Bedside nurse   Patient Position Received Alarm on;Up in chair   Preferred Learning Style verbal;visual   General Comment Pt pleasant and agreeable to therapy, cleared for participation. Deferred ADLs at this time and stated he recenly washed up with staff, agreeable to other activity.   Pain Assessment   Pain Assessment 0-10   0-10 (Numeric) Pain Score 0 - No pain   Transfers   Transfer Yes   Transfer 1   Transfer From 1 Sit to   Transfer to 1 Stand   Technique 1 Stand to sit;Sit to stand   Transfer Device 1 Walker;Gait belt   Transfer Level of Assistance 1 Minimum assistance   Trials/Comments 1 Multiple sit<>stands Min A for chair.   Therapeutic Exercise   Therapeutic Exercise Performed Yes   Therapeutic Exercise Activity 1 B UE ther ex within all functional planes, chair push ups   IP OT Assessment   End of Session Communication Bedside nurse   End of Session Patient Position Up in chair;Alarm on   Inpatient Plan   OT Frequency 3 times per week   OT Discharge Recommendations Moderate intensity level of continued care       Outcome Measures:[facility] Daily Activity  Putting on and taking off regular lower body clothing: A lot  Bathing (including washing, rinsing, drying): A lot  Putting on and taking off regular upper body clothing: A little  Toileting, which includes using toilet, bedpan or urinal: A lot  Taking care of personal grooming such as brushing teeth:  A little  Eating Meals: None  Daily Activity - Total Score: 16  Education Documentation  Body Mechanics, taught by ASHLEY Amos at 8/14/2024  4:11 PM.  Learner: Patient  Readiness: Acceptance  Method: Explanation, Demonstration  Response: Verbalizes Understanding, Demonstrated Understanding, Needs Reinforcement    Precautions, taught by ASHLEY Amos at 8/14/2024  4:11 PM.  Learner: Patient  Readiness: Acceptance  Method: Explanation, Demonstration  Response: Verbalizes Understanding, Demonstrated Understanding, Needs Reinforcement    Body Mechanics, taught by ASHLEY Amos at 8/13/2024  4:33 PM.  Learner: Patient  Readiness: Acceptance  Method: Demonstration  Response: Verbalizes Understanding, Demonstrated Understanding, Needs Reinforcement    Precautions, taught by ASHLEY Amos at 8/13/2024  4:33 PM.  Learner: Patient  Readiness: Acceptance  Method: Demonstration  Response: Verbalizes Understanding, Demonstrated Understanding, Needs Reinforcement    Education Comments  No comments found.            Goals:  Encounter Problems       Encounter Problems (Active)       OT Goals       OT Goal 1 (Progressing)       Start:  08/08/24    Expected End:  08/22/24       Pt will complete ADL's and mobility with good stand balance            OT Goal 2 (Progressing)       Start:  08/08/24    Expected End:  08/22/24       Pt with complete all transfers safely with supervision            OT Goal 3 (Progressing)       Start:  08/08/24    Expected End:  08/22/24       Pt will complete UB dressing ADL's with supervision using adaptive device(s) as needed           OT Goal 4 (Progressing)       Start:  08/08/24    Expected End:  08/22/24       Pt will complete LB dressing ADL's with supervision using adaptive device(s) as needed           OT Goal 5 (Progressing)       Start:  08/08/24    Expected End:  08/22/24       Pt with complete grooming ADL's with supervision and good  stand balance

## 2024-08-14 NOTE — PROGRESS NOTES
Physical Therapy    Physical Therapy    Physical Therapy Treatment    Patient Name: Milton Lane  MRN: [id number]  Today's Date: 8/14/2024  Time Calculation  Start Time: 0838  Stop Time: 0901  Time Calculation (min): 23 min     3005/3005-A     08/14/24 0838   PT  Visit   PT Received On 08/14/24   Response to Previous Treatment Patient with no complaints from previous session.   General   Reason for Referral ADL's   Referred By Rishi Anthony MD   Past Medical History Relevant to Rehab cardiomyopathy (EF 35%), diastolic dysfunction, mild aortic stenosis, COPD on home O2 (ranges from 2-4 L), a-fib (no A-C), CAD s/p PCI/stent, bipolar dz, alcoholic cadiomyopathy.   Prior to Session Communication Bedside nurse   Patient Position Received Up in chair;Alarm on   General Comment Pt pleasant and agreeable to therapy, cleared for particiaption.   Precautions   Medical Precautions Fall precautions   Pain Assessment   Pain Assessment 0-10   0-10 (Numeric) Pain Score 0 - No pain   Effect of Pain on Daily Activities .   Cognition   Overall Cognitive Status WFL   Orientation Level Oriented X4   Ambulation/Gait Training   Ambulation/Gait Training Performed Yes   Ambulation/Gait Training 1   Surface 1 Level tile   Device 1 Rolling walker   Gait Support Devices Gait belt   Assistance 1 Minimum assistance;Minimal verbal cues   Quality of Gait 1 Diminished heel strike;Decreased step length   Comments/Distance (ft) 1 10'x2, 15'x2 slow reciprocal gait, cues to stay close to WW for increased stability and safety, fair follow through. Pt fatigues quickly requiring seated rest breaks between bouts.   Transfers   Transfer Yes   Transfer 1   Transfer From 1 Chair with arms to   Transfer to 1 Stand;Sit;Bed   Technique 1 Stand to sit;Sit to stand   Transfer Device 1 Walker;Gait belt   Transfer Level of Assistance 1 Minimum assistance   Trials/Comments 1 Multiple STS cheri bed and chair levels. Mary Kay from bed leveland Mary Kay progressing to CGA  rom chair level with cues for UEplacement and sequencing for increased safety, fair follow through.   Activity Tolerance   Endurance Tolerates 10 - 20 min exercise with multiple rests   PT Assessment   PT Assessment Results Decreased strength;Decreased endurance;Impaired balance;Decreased mobility   Rehab Prognosis Good   End of Session Communication Bedside nurse   Assessment Comment Pt fatigues quickly requiring seated rest breaks between activities. Pt is easily distracted requiring min cues to stay on task. Pt able to minimally increase ambulation trials. Pt declined standing activities and therex this session d/t fatigue.   End of Session Patient Position Up in chair;Alarm on     Outcome Measures:  [facility] Basic Mobility  Turning from your back to your side while in a flat bed without using bedrails: A little  Moving from lying on your back to sitting on the side of a flat bed without using bedrails: A little  Moving to and from bed to chair (including a wheelchair): A little  Standing up from a chair using your arms (e.g. wheelchair or bedside chair): A little  To walk in hospital room: A little  Climbing 3-5 steps with railing: A lot  Basic Mobility - Total Score: 17                             EDUCATION:  Outpatient Education  Individual(s) Educated: Patient  Education Provided: Fall Risk  Education Documentation  Precautions, taught by Sabine Degroot PTA at 8/14/2024  9:27 AM.  Learner: Patient  Readiness: Acceptance  Method: Explanation  Response: Verbalizes Understanding, Demonstrated Understanding, Needs Reinforcement    Body Mechanics, taught by Sabine Degroot PTA at 8/14/2024  9:27 AM.  Learner: Patient  Readiness: Acceptance  Method: Explanation  Response: Verbalizes Understanding, Demonstrated Understanding, Needs Reinforcement    Home Exercise Program, taught by Sabine Degroot PTA at 8/14/2024  9:27 AM.  Learner: Patient  Readiness: Acceptance  Method: Explanation  Response: Verbalizes Understanding,  Demonstrated Understanding, Needs Reinforcement    Mobility Training, taught by Sabine Degroot PTA at 8/14/2024  9:27 AM.  Learner: Patient  Readiness: Acceptance  Method: Explanation  Response: Verbalizes Understanding, Demonstrated Understanding, Needs Reinforcement    Precautions, taught by Sabine Degroot PTA at 8/13/2024 10:24 AM.  Learner: Patient  Readiness: Acceptance  Method: Explanation, Demonstration  Response: Verbalizes Understanding, Demonstrated Understanding, Needs Reinforcement    Body Mechanics, taught by Sabine Degroot PTA at 8/13/2024 10:24 AM.  Learner: Patient  Readiness: Acceptance  Method: Explanation, Demonstration  Response: Verbalizes Understanding, Demonstrated Understanding, Needs Reinforcement    Home Exercise Program, taught by Sabine Degroot PTA at 8/13/2024 10:24 AM.  Learner: Patient  Readiness: Acceptance  Method: Explanation, Demonstration  Response: Verbalizes Understanding, Demonstrated Understanding, Needs Reinforcement    Mobility Training, taught by Sabine Degroot PTA at 8/13/2024 10:24 AM.  Learner: Patient  Readiness: Acceptance  Method: Explanation, Demonstration  Response: Verbalizes Understanding, Demonstrated Understanding, Needs Reinforcement    Education Comments  No comments found.        GOALS:  Encounter Problems       Encounter Problems (Active)       PT Problem       Balance       Start:  08/08/24    Expected End:  08/22/24       Pt will improve static/dynamic balance to Good in order to improve safety with functional tasks.           Bed mobility       Start:  08/08/24    Expected End:  08/22/24       Pt transfer sup<>sit with IND           Transfers       Start:  08/08/24    Expected End:  08/22/24       Pt will transfer sit<>stand with mod I.           Gait       Start:  08/08/24    Expected End:  08/22/24       Pt will AMB 75 Feet with RW And mod I.           Endurance       Start:  08/08/24    Expected End:  08/22/24       Pt will tolerate at least 5 minutes  OOB activity without SOB or reports of fatigue

## 2024-08-14 NOTE — PROGRESS NOTES
Milton Lane is a 74 y.o. male on day 7 of admission presenting with Acute on chronic hypoxic respiratory failure (Multi).    Subjective   No cp       Objective         Current Facility-Administered Medications:     acetaminophen (Tylenol) tablet 650 mg, 650 mg, oral, q4h PRN **OR** acetaminophen (Tylenol) oral liquid 650 mg, 650 mg, oral, q4h PRN **OR** acetaminophen (Tylenol) suppository 650 mg, 650 mg, rectal, q4h PRN, DAVID Holden-CNP    albuterol 2.5 mg /3 mL (0.083 %) nebulizer solution 2.5 mg, 2.5 mg, nebulization, q2h PRN, Rishi Anthony MD    amiodarone (Pacerone) tablet 200 mg, 200 mg, oral, Nightly, Angela Grover MD, 200 mg at 08/13/24 2021    aspirin EC tablet 81 mg, 81 mg, oral, Daily, DAVID Holden-CNP, 81 mg at 08/14/24 0905    atorvastatin (Lipitor) tablet 20 mg, 20 mg, oral, Daily, DAVID Holden-CNP, 20 mg at 08/13/24 2021    budesonide (Pulmicort) 0.5 mg/2 mL nebulizer solution 0.5 mg, 0.5 mg, nebulization, BID, DAVID Holden-CNP, 0.5 mg at 08/13/24 2054    bumetanide (Bumex) tablet 3 mg, 3 mg, oral, BID, Sofiya Greenwood MD, 3 mg at 08/14/24 0916    dextrose 50 % injection 12.5 g, 12.5 g, intravenous, q15 min PRN, Stacey Hernandez APRN-CNP    dextrose 50 % injection 25 g, 25 g, intravenous, q15 min PRN, DAVID Holden-CNP    empagliflozin (Jardiance) tablet 12.5 mg, 12.5 mg, oral, Daily, DAVID Holden-CNP, 12.5 mg at 08/14/24 0905    enoxaparin (Lovenox) syringe 40 mg, 40 mg, subcutaneous, q24h, DEBBIE Holden, 40 mg at 08/13/24 2021    fluticasone (Flonase) nasal spray 2 spray, 2 spray, Each Nostril, Daily, DEBBIE Holden, 2 spray at 08/14/24 0905    formoterol (Perforomist) 20 mcg/2 mL nebulizer solution 20 mcg, 20 mcg, nebulization, BID, DEBBIE Holden, 20 mcg at 08/13/24 2054    glucagon (Glucagen) injection 1 mg, 1 mg, intramuscular, q15 min PRN, DEBBIE Holden    glucagon (Glucagen)  injection 1 mg, 1 mg, intramuscular, q15 min PRN, TOBY HoldenCNP    guaiFENesin (Robitussin) 100 mg/5 mL syrup 200 mg, 200 mg, oral, q6h PRN, Valentine Fletcher, DAVID-CNP, 200 mg at 08/14/24 0216    insulin lispro (HumaLOG) injection 0-5 Units, 0-5 Units, subcutaneous, 4x daily, DEBBIE Holden, 1 Units at 08/12/24 2131    ipratropium-albuteroL (Duo-Neb) 0.5-2.5 mg/3 mL nebulizer solution 3 mL, 3 mL, nebulization, 4x daily, Rishi Anthony MD, 3 mL at 08/13/24 2054    isosorbide mononitrate ER (Imdur) 24 hr tablet 30 mg, 30 mg, oral, Nightly, DEBBIE Pompa, 30 mg at 08/13/24 2021    lamoTRIgine (LaMICtal) tablet 100 mg, 100 mg, oral, Nightly, Angela Grover MD, 100 mg at 08/13/24 2022    lidocaine 4 % patch 1 patch, 1 patch, transdermal, Daily, DEBBIE Pompa, 1 patch at 08/14/24 0906    melatonin tablet 3 mg, 3 mg, oral, Nightly PRN, DEBBIE Holden, 3 mg at 08/14/24 0216    metoprolol succinate XL (Toprol-XL) 24 hr tablet 25 mg, 25 mg, oral, Daily, DEBBIE Pompa, 25 mg at 08/14/24 0905    nicotine (Nicoderm CQ) 7 mg/24 hr patch 1 patch, 1 patch, transdermal, q24h, DEBBIE Holden, 1 patch at 08/14/24 0906    oxygen (O2) therapy, , inhalation, Continuous PRN - O2/gases, Rishi Anthony MD    oxygen (O2) therapy, , inhalation, Continuous PRN - O2/gases, Rishi Anthony MD, 4.5 L/min at 08/13/24 0927    polyethylene glycol (Glycolax, Miralax) packet 17 g, 17 g, oral, Daily, DEBBIE Holden    [Held by provider] spironolactone (Aldactone) tablet 12.5 mg, 12.5 mg, oral, BID, DEBBIE Holden    traZODone (Desyrel) tablet 50 mg, 50 mg, oral, Nightly, DEBBIE Holden, 50 mg at 08/13/24 2021    urea (Ure-Na) oral powder 15 g, 15 g, oral, BID, Sofiya Greenwood MD, 15 g at 08/12/24 2131       Physical Exam  HENT:      Head: Normocephalic.   Eyes:      Conjunctiva/sclera: Conjunctivae normal.   Cardiovascular:     "  Rate and Rhythm: Regular rhythm.   Pulmonary:      Breath sounds: Normal breath sounds.   Abdominal:      General: Bowel sounds are normal.      Palpations: Abdomen is soft.   Musculoskeletal:         General: Normal range of motion.   Skin:     General: Skin is warm and dry.   Neurological:      General: No focal deficit present.      Mental Status: He is alert.   Psychiatric:         Behavior: Behavior normal.           Last Recorded Vitals  Blood pressure 108/62, pulse 76, temperature 36.2 °C (97.2 °F), temperature source Temporal, resp. rate 20, height 1.753 m (5' 9.02\"), weight 94 kg (207 lb 3.7 oz), SpO2 98%.  Intake/Output last 3 Shifts:  I/O last 3 completed shifts:  In: - (0 mL/kg)   Out: 4700 (50 mL/kg) [Urine:4700 (1.4 mL/kg/hr)]  Weight: 94 kg     Labs:       Results for orders placed or performed during the hospital encounter of 08/07/24 (from the past 24 hour(s))   POCT GLUCOSE   Result Value Ref Range    POCT Glucose 114 (H) 74 - 99 mg/dL   CBC   Result Value Ref Range    WBC 10.0 4.4 - 11.3 x10*3/uL    nRBC 0.0 0.0 - 0.0 /100 WBCs    RBC 3.40 (L) 4.50 - 5.90 x10*6/uL    Hemoglobin 10.1 (L) 13.5 - 17.5 g/dL    Hematocrit 32.5 (L) 41.0 - 52.0 %    MCV 96 80 - 100 fL    MCH 29.7 26.0 - 34.0 pg    MCHC 31.1 (L) 32.0 - 36.0 g/dL    RDW 16.3 (H) 11.5 - 14.5 %    Platelets 167 150 - 450 x10*3/uL   Basic metabolic panel   Result Value Ref Range    Glucose 135 (H) 74 - 99 mg/dL    Sodium 129 (L) 136 - 145 mmol/L    Potassium 4.3 3.5 - 5.3 mmol/L    Chloride 82 (L) 98 - 107 mmol/L    Bicarbonate 43 (HH) 21 - 32 mmol/L    Anion Gap 8 (L) 10 - 20 mmol/L    Urea Nitrogen 31 (H) 6 - 23 mg/dL    Creatinine 0.92 0.50 - 1.30 mg/dL    eGFR 86 >60 mL/min/1.73m*2    Calcium 8.4 (L) 8.6 - 10.3 mg/dL   Magnesium   Result Value Ref Range    Magnesium 1.72 1.60 - 2.40 mg/dL   POCT GLUCOSE   Result Value Ref Range    POCT Glucose 94 74 - 99 mg/dL              Assessment/Plan   Principal Problem:    Acute on chronic hypoxic " respiratory failure (Multi)  Active Problems:    Acute on chronic combined systolic (congestive) and diastolic (congestive) heart failure (Multi)    Hyponatremia    Hypervolemia    COPD (chronic obstructive pulmonary disease) (Multi)    Abnormal urinalysis  Metabolic alkalosis       PLAN: Pt is slowly improving, med list and labs reviewed for now c/w current Rx, d/w CNP, if stable consider for dc in AM, follow closely.             Rishi Anthony MD
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
## 2024-08-14 NOTE — CARE PLAN
The patient's goals for the shift include      The clinical goals for the shift include see care plan      Problem: Skin  Goal: Prevent/minimize sheer/friction injuries  Outcome: Progressing  Flowsheets (Taken 8/13/2024 2349 by Rody Jain RN)  Prevent/minimize sheer/friction injuries:   Use pull sheet   Complete micro-shifts as needed if patient unable. Adjust patient position to relieve pressure points, not a full turn   HOB 30 degrees or less   Turn/reposition every 2 hours/use positioning/transfer devices     Problem: Pain  Goal: Turns in bed with improved pain control throughout the shift  Outcome: Progressing     Problem: Respiratory  Goal: Minimize anxiety/maximize coping throughout shift  Outcome: Progressing     Problem: Discharge Planning  Goal: Discharge to home or other facility with appropriate resources  Outcome: Progressing     Problem: Fall/Injury  Goal: Not fall by end of shift  Outcome: Progressing  Goal: Be free from injury by end of the shift  Outcome: Progressing  Goal: Verbalize understanding of personal risk factors for fall in the hospital  Outcome: Progressing  Goal: Verbalize understanding of risk factor reduction measures to prevent injury from fall in the home  Outcome: Progressing  Goal: Use assistive devices by end of the shift  Outcome: Progressing  Goal: Pace activities to prevent fatigue by end of the shift  Outcome: Progressing     EOS note: Pt remains oriented x4. Reports of discomfort with urinating, Gabrielle at bedside when patient was discussing this. Pt remains with purewick in place, good output. Worked with therapy, ambulated in room. Weaned to 2L and saturating well. BP soft but asymptomatic, Plan is to discharge tomorrow. Continuing to eat meals. Continues to impulsively get up, encouraged to use call light. Took all pills whole.    Pt resting at this time. Bed is in lowest position and locked with alarm on. Call light and personal possesions are within reach.

## 2024-08-14 NOTE — CARE PLAN
The patient's goals for the shift include      The clinical goals for the shift include Pt will maintain SPO2 >92% throughout this shift      Problem: Skin  Goal: Prevent/minimize sheer/friction injuries  Outcome: Progressing  Flowsheets (Taken 8/13/2024 3279)  Prevent/minimize sheer/friction injuries:   Use pull sheet   Complete micro-shifts as needed if patient unable. Adjust patient position to relieve pressure points, not a full turn   HOB 30 degrees or less   Turn/reposition every 2 hours/use positioning/transfer devices  Note: Triad ointment applied to wounds as ordered.     Problem: Pain  Goal: Turns in bed with improved pain control throughout the shift  Outcome: Progressing     Problem: Respiratory  Goal: Minimize anxiety/maximize coping throughout shift  Outcome: Progressing     Problem: Discharge Planning  Goal: Discharge to home or other facility with appropriate resources  Outcome: Progressing     Problem: Fall/Injury  Goal: Not fall by end of shift  Outcome: Progressing  Goal: Be free from injury by end of the shift  Outcome: Progressing  Goal: Verbalize understanding of personal risk factors for fall in the hospital  Outcome: Progressing  Goal: Verbalize understanding of risk factor reduction measures to prevent injury from fall in the home  Outcome: Progressing  Goal: Use assistive devices by end of the shift  Outcome: Progressing  Goal: Pace activities to prevent fatigue by end of the shift  Outcome: Progressing

## 2024-08-15 VITALS
BODY MASS INDEX: 31.83 KG/M2 | HEART RATE: 87 BPM | SYSTOLIC BLOOD PRESSURE: 98 MMHG | OXYGEN SATURATION: 91 % | HEIGHT: 69 IN | DIASTOLIC BLOOD PRESSURE: 51 MMHG | RESPIRATION RATE: 20 BRPM | WEIGHT: 214.9 LBS | TEMPERATURE: 97.7 F

## 2024-08-15 LAB
ANION GAP SERPL CALC-SCNC: 11 MMOL/L (ref 10–20)
APPEARANCE UR: CLEAR
BILIRUB UR STRIP.AUTO-MCNC: NEGATIVE MG/DL
BUN SERPL-MCNC: 29 MG/DL (ref 6–23)
CALCIUM SERPL-MCNC: 8.5 MG/DL (ref 8.6–10.3)
CHLORIDE SERPL-SCNC: 81 MMOL/L (ref 98–107)
CO2 SERPL-SCNC: 43 MMOL/L (ref 21–32)
COLOR UR: ABNORMAL
CREAT SERPL-MCNC: 0.84 MG/DL (ref 0.5–1.3)
EGFRCR SERPLBLD CKD-EPI 2021: 90 ML/MIN/1.73M*2
ERYTHROCYTE [DISTWIDTH] IN BLOOD BY AUTOMATED COUNT: 16.4 % (ref 11.5–14.5)
GLUCOSE SERPL-MCNC: 100 MG/DL (ref 74–99)
GLUCOSE UR STRIP.AUTO-MCNC: ABNORMAL MG/DL
HCT VFR BLD AUTO: 35.4 % (ref 41–52)
HGB BLD-MCNC: 10.9 G/DL (ref 13.5–17.5)
HOLD SPECIMEN: NORMAL
KETONES UR STRIP.AUTO-MCNC: NEGATIVE MG/DL
LEUKOCYTE ESTERASE UR QL STRIP.AUTO: ABNORMAL
MAGNESIUM SERPL-MCNC: 1.97 MG/DL (ref 1.6–2.4)
MCH RBC QN AUTO: 29.7 PG (ref 26–34)
MCHC RBC AUTO-ENTMCNC: 30.8 G/DL (ref 32–36)
MCV RBC AUTO: 97 FL (ref 80–100)
NITRITE UR QL STRIP.AUTO: NEGATIVE
NRBC BLD-RTO: 0 /100 WBCS (ref 0–0)
PH UR STRIP.AUTO: 7 [PH]
PLATELET # BLD AUTO: 163 X10*3/UL (ref 150–450)
POTASSIUM SERPL-SCNC: 4.5 MMOL/L (ref 3.5–5.3)
PROT UR STRIP.AUTO-MCNC: NEGATIVE MG/DL
RBC # BLD AUTO: 3.67 X10*6/UL (ref 4.5–5.9)
RBC # UR STRIP.AUTO: NEGATIVE /UL
RBC #/AREA URNS AUTO: NORMAL /HPF
SODIUM SERPL-SCNC: 130 MMOL/L (ref 136–145)
SP GR UR STRIP.AUTO: 1.01
UROBILINOGEN UR STRIP.AUTO-MCNC: NORMAL MG/DL
WBC # BLD AUTO: 8 X10*3/UL (ref 4.4–11.3)
WBC #/AREA URNS AUTO: NORMAL /HPF

## 2024-08-15 PROCEDURE — 2500000002 HC RX 250 W HCPCS SELF ADMINISTERED DRUGS (ALT 637 FOR MEDICARE OP, ALT 636 FOR OP/ED): Performed by: INTERNAL MEDICINE

## 2024-08-15 PROCEDURE — 36415 COLL VENOUS BLD VENIPUNCTURE: CPT | Performed by: NURSE PRACTITIONER

## 2024-08-15 PROCEDURE — 87086 URINE CULTURE/COLONY COUNT: CPT | Mod: STJLAB | Performed by: NURSE PRACTITIONER

## 2024-08-15 PROCEDURE — 2500000005 HC RX 250 GENERAL PHARMACY W/O HCPCS: Performed by: NURSE PRACTITIONER

## 2024-08-15 PROCEDURE — 80048 BASIC METABOLIC PNL TOTAL CA: CPT | Performed by: NURSE PRACTITIONER

## 2024-08-15 PROCEDURE — 94640 AIRWAY INHALATION TREATMENT: CPT

## 2024-08-15 PROCEDURE — 81001 URINALYSIS AUTO W/SCOPE: CPT | Performed by: NURSE PRACTITIONER

## 2024-08-15 PROCEDURE — 85027 COMPLETE CBC AUTOMATED: CPT | Performed by: NURSE PRACTITIONER

## 2024-08-15 PROCEDURE — 2500000001 HC RX 250 WO HCPCS SELF ADMINISTERED DRUGS (ALT 637 FOR MEDICARE OP): Performed by: INTERNAL MEDICINE

## 2024-08-15 PROCEDURE — 83735 ASSAY OF MAGNESIUM: CPT | Performed by: NURSE PRACTITIONER

## 2024-08-15 PROCEDURE — 2500000001 HC RX 250 WO HCPCS SELF ADMINISTERED DRUGS (ALT 637 FOR MEDICARE OP): Performed by: NURSE PRACTITIONER

## 2024-08-15 PROCEDURE — S4991 NICOTINE PATCH NONLEGEND: HCPCS | Performed by: NURSE PRACTITIONER

## 2024-08-15 PROCEDURE — 2500000002 HC RX 250 W HCPCS SELF ADMINISTERED DRUGS (ALT 637 FOR MEDICARE OP, ALT 636 FOR OP/ED): Performed by: NURSE PRACTITIONER

## 2024-08-15 PROCEDURE — 2500000001 HC RX 250 WO HCPCS SELF ADMINISTERED DRUGS (ALT 637 FOR MEDICARE OP): Performed by: PHYSICIAN ASSISTANT

## 2024-08-15 PROCEDURE — 2500000005 HC RX 250 GENERAL PHARMACY W/O HCPCS: Performed by: INTERNAL MEDICINE

## 2024-08-15 RX ORDER — CEPHALEXIN 500 MG/1
500 CAPSULE ORAL EVERY 8 HOURS SCHEDULED
Status: DISCONTINUED | OUTPATIENT
Start: 2024-08-15 | End: 2024-08-15 | Stop reason: HOSPADM

## 2024-08-15 RX ORDER — CEPHALEXIN 500 MG/1
500 CAPSULE ORAL EVERY 8 HOURS SCHEDULED
Status: ON HOLD
Start: 2024-08-15 | End: 2024-08-22

## 2024-08-15 ASSESSMENT — PAIN SCALES - GENERAL: PAINLEVEL_OUTOF10: 0 - NO PAIN

## 2024-08-15 ASSESSMENT — COGNITIVE AND FUNCTIONAL STATUS - GENERAL
DRESSING REGULAR UPPER BODY CLOTHING: A LITTLE
PERSONAL GROOMING: A LITTLE
TOILETING: A LOT
HELP NEEDED FOR BATHING: A LOT
DAILY ACTIVITIY SCORE: 16
MOVING FROM LYING ON BACK TO SITTING ON SIDE OF FLAT BED WITH BEDRAILS: A LITTLE
DRESSING REGULAR LOWER BODY CLOTHING: A LOT
MOVING TO AND FROM BED TO CHAIR: A LITTLE
STANDING UP FROM CHAIR USING ARMS: A LITTLE
TURNING FROM BACK TO SIDE WHILE IN FLAT BAD: A LITTLE
WALKING IN HOSPITAL ROOM: A LITTLE
MOBILITY SCORE: 17
CLIMB 3 TO 5 STEPS WITH RAILING: A LOT

## 2024-08-15 ASSESSMENT — PAIN - FUNCTIONAL ASSESSMENT: PAIN_FUNCTIONAL_ASSESSMENT: 0-10

## 2024-08-15 NOTE — CARE PLAN
Medical house staff was contacted by the attending physician to assist with the patient's discharge from the hospital. The attending physician has given the order for the patient to be discharged from the hospital. The consulting physician(s) have cleared the patient for discharge. Discharge plan specifics, available microbiology, available labs, available radiological studies, available immunological studies, available pathology, available cytology, meds including dosages and frequencies, and treatment plans confirmed with all providers.  Consultation of pharmacist was all sought when needed . Consultation with PT, OT, social work, diabetes educator, nutritionist, and case management was done as well.  The patient is being provided discharge orders in their discharge packet.      Referral to urology placed in discharge orders. LETITIA Bhatti instructed to let SNF know to follow up on Urine culture. Per Dr Anthony, keflex 500mg tid x7 days was ordered.
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
## 2024-08-15 NOTE — PROGRESS NOTES
08/15/24 0918   Discharge Planning   What day is the transport expected? 08/15/24     Spoke with patient. Plan is discharge today when today's labs are reviewed and ok. Transport will be wheelchair and O2 at 2l. He states he can sign all his papers . His daughter is POA and is in [place] for several weeks. Called POA and left message of planned discharge and my number. [facility] asking about his soft boots lost on his last admission there and what they are gong to do about it.   Daughter returned call. She is ok with discharge today to [facility].  No need to call her with transport time Discussed IMM with her also.   [address] set for 230 today

## 2024-08-15 NOTE — CARE PLAN
The patient's goals for the shift include      The clinical goals for the shift include see care plan      Problem: Skin  Goal: Prevent/minimize sheer/friction injuries  Outcome: Progressing     Problem: Pain  Goal: Turns in bed with improved pain control throughout the shift  Outcome: Progressing     Problem: Respiratory  Goal: Minimize anxiety/maximize coping throughout shift  Outcome: Progressing     Problem: Discharge Planning  Goal: Discharge to home or other facility with appropriate resources  Outcome: Progressing     Problem: Fall/Injury  Goal: Not fall by end of shift  Outcome: Progressing  Goal: Be free from injury by end of the shift  Outcome: Progressing  Goal: Verbalize understanding of personal risk factors for fall in the hospital  Outcome: Progressing  Goal: Verbalize understanding of risk factor reduction measures to prevent injury from fall in the home  Outcome: Progressing  Goal: Use assistive devices by end of the shift  Outcome: Progressing  Goal: Pace activities to prevent fatigue by end of the shift  Outcome: Progressing     EOS note pt discharging to [facility] at this time. Report called to Ana. No further questions. Pt remains on 2L O2. No reports of pain. Prescribed keflex for fullness with urination, will jessica to follow with urology outpatient. Will need repeat UA, facility is aware of this. Took all pillls in applesauce. Ambulated in room. Remains afib on tele. All goals met prior to discharge , care plan complete.

## 2024-08-15 NOTE — NURSING NOTE
[facility] NOTE  SpO2 was stable on 2L O2 NC.  Pt had a few coughing episodes. Guaifenesin was given. Crackles were heard in left lower lobe. Patient continues to strain and have pain when urinating. Pt states that he feels sharp pressure and needs to stand to urinate.  He continues to urgently get up without calling and sets off alarm. RN notified [facility] suggesting urology consult or flomax.  Patient denied pain.  Trazodone and melatonin were both given and seemed to help the pt sleep.   Bed alarm or chair alarm remained on.   Vitals remained stable.   Safety maintained.

## 2024-08-15 NOTE — CARE PLAN
The patient's goals for the shift include      The clinical goals for the shift include see care plan      Problem: Skin  Goal: Prevent/minimize sheer/friction injuries  Outcome: Progressing  Flowsheets (Taken 8/15/2024 4921)  Prevent/minimize sheer/friction injuries:   Use pull sheet   Complete micro-shifts as needed if patient unable. Adjust patient position to relieve pressure points, not a full turn   HOB 30 degrees or less   Turn/reposition every 2 hours/use positioning/transfer devices     Problem: Respiratory  Goal: Minimize anxiety/maximize coping throughout shift  Outcome: Progressing     Problem: Discharge Planning  Goal: Discharge to home or other facility with appropriate resources  Outcome: Progressing     Problem: Fall/Injury  Goal: Not fall by end of shift  Outcome: Progressing  Goal: Be free from injury by end of the shift  Outcome: Progressing  Goal: Verbalize understanding of personal risk factors for fall in the hospital  Outcome: Progressing  Goal: Verbalize understanding of risk factor reduction measures to prevent injury from fall in the home  Outcome: Progressing  Goal: Use assistive devices by end of the shift  Outcome: Progressing  Goal: Pace activities to prevent fatigue by end of the shift  Outcome: Progressing

## 2024-08-16 ENCOUNTER — TELEPHONE (OUTPATIENT)
Dept: CARDIOLOGY | Facility: CLINIC | Age: 74
End: 2024-08-16
Payer: MEDICARE

## 2024-08-16 NOTE — DISCHARGE SUMMARY
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     Discharge Meds     Your medication list        START taking these medications        Instructions Last Dose Given Next Dose Due   cephalexin 500 mg capsule  Commonly known as: Keflex      Take 1 capsule (500 mg) by mouth every 8 hours for 21 doses. END DATE: 8/22/24              CHANGE how you take these medications        Instructions Last Dose Given Next Dose Due   bumetanide 2 mg tablet  Commonly known as: Bumex  What changed:   medication strength  how much to take      Take 1 tablet (2 mg) by mouth 2 times daily (morning and late afternoon).              CONTINUE taking these medications        Instructions Last Dose Given Next Dose Due   amiodarone 200 mg tablet  Commonly known as: Pacerone      Take 1 tablet (200 mg) by mouth once daily.       aspirin 81 mg EC tablet           atorvastatin 20 mg tablet  Commonly known as: Lipitor      Take 1 tablet (20 mg) by mouth once daily.       empagliflozin 25 mg  Commonly known as: Jardiance           fluticasone 50 mcg/actuation nasal spray  Commonly known as: Flonase      Administer 2 sprays into each nostril once daily. Shake gently. Before first use, prime pump. After use, clean tip and replace cap.       fluticasone propion-salmeteroL 250-50 mcg/dose diskus inhaler  Commonly known as: Advair Diskus           isosorbide mononitrate ER 30 mg 24 hr tablet  Commonly known as: Imdur      Take 1 tablet (30 mg) by mouth once daily. Do not crush or chew.       lamoTRIgine 100 mg tablet  Commonly known as: LaMICtal           melatonin 3 mg capsule           metoprolol succinate XL 25 mg 24 hr tablet  Commonly known as: Toprol-XL      Take 1 tablet (25 mg) by mouth once daily. Do not crush  or chew.       nicotine 7 mg/24 hr patch  Commonly known as: Nicoderm CQ           nicotine polacrilex 2 mg lozenge  Commonly known as: Commit           oxygen gas therapy  Commonly known as: O2      Inhale 1 each continuously.       traZODone 50 mg tablet  Commonly known as: Desyrel           Ventolin HFA 90 mcg/actuation inhaler  Generic drug: albuterol           albuterol 2.5 mg /3 mL (0.083 %) nebulizer solution                  STOP taking these medications      Biofreeze (menthol) 10.5 % aerosol,spray  Generic drug: menthol        magnesium oxide 400 mg (241.3 mg magnesium) tablet  Commonly known as: Mag-Ox        spironolactone 25 mg tablet  Commonly known as: Aldactone                  Where to Get Your Medications        Information about where to get these medications is not yet available    Ask your nurse or doctor about these medications  bumetanide 2 mg tablet  cephalexin 500 mg capsule         Test Results Pending At Discharge  Pending Labs       Order Current Status    Urine Culture In process            Hospital Course   Pt is a 74 y.o. M with PMHX  Cardiomyopathy, mild aortic stenosis, COPD on 2L O2, , PAF no AC, CAD/PCI/stent;  presenting to ER from [facility]  on 8/7/2024 with worsened SOB and occasional CP.  Patient was noted to have acute on chronic combined heart failure and acute on chronic hypoxic respiratory failure.  He was evaluated by cardiology and nephrology, treated conservatively was DC'd home for further care and follow-up as an outpatient.     Pertinent Physical Exam At Time of Discharge  Physical Exam  HENT:      Head: Normocephalic.      Mouth/Throat:      Pharynx: Oropharynx is clear.   Eyes:      Conjunctiva/sclera: Conjunctivae normal.   Cardiovascular:      Rate and Rhythm: Regular rhythm.   Pulmonary:      Breath sounds: Normal breath sounds.   Abdominal:      General: Bowel sounds are normal.      Palpations: Abdomen is soft.   Musculoskeletal:         General: Normal  range of motion.   Skin:     General: Skin is warm and dry.   Neurological:      General: No focal deficit present.      Mental Status: He is alert.   Psychiatric:         Behavior: Behavior normal.         Outpatient Follow-Up  Follow-up with PCP      Rishi Anthony MD

## 2024-08-16 NOTE — TELEPHONE ENCOUNTER
I reached out to care taker to [place] to reschedule appointment with Dr. Brian Maki from  08/08/2024 when the power was out. She stated he is in a nursing home and a cardiologist saw him there. She did not want to reschedule at this time.

## 2024-08-17 LAB — BACTERIA UR CULT: NORMAL

## 2024-08-20 ENCOUNTER — NURSING HOME VISIT (OUTPATIENT)
Dept: POST ACUTE CARE | Facility: EXTERNAL LOCATION | Age: 74
End: 2024-08-20
Payer: MEDICARE

## 2024-08-20 DIAGNOSIS — J44.9 CHRONIC OBSTRUCTIVE PULMONARY DISEASE, UNSPECIFIED COPD TYPE (MULTI): ICD-10-CM

## 2024-08-20 DIAGNOSIS — E87.70 HYPERVOLEMIA, UNSPECIFIED HYPERVOLEMIA TYPE: ICD-10-CM

## 2024-08-20 DIAGNOSIS — I50.22 CHRONIC SYSTOLIC HEART FAILURE (MULTI): ICD-10-CM

## 2024-08-20 DIAGNOSIS — I48.11 LONGSTANDING PERSISTENT ATRIAL FIBRILLATION (MULTI): Primary | Chronic | ICD-10-CM

## 2024-08-20 DIAGNOSIS — I50.9 ACUTE ON CHRONIC CONGESTIVE HEART FAILURE, UNSPECIFIED HEART FAILURE TYPE (MULTI): ICD-10-CM

## 2024-08-20 DIAGNOSIS — E87.1 HYPONATREMIA: ICD-10-CM

## 2024-08-20 PROCEDURE — 99306 1ST NF CARE HIGH MDM 50: CPT | Performed by: STUDENT IN AN ORGANIZED HEALTH CARE EDUCATION/TRAINING PROGRAM

## 2024-08-20 NOTE — LETTER
Patient: Milton Lane  : 1950    Encounter Date: 2024    Subjective  Patient ID: Milton Lane is a 74 y.o. male.    74-year-old male with past medical history of cardiomyopathy and diastolic dysfunction, aortic stenosis, COPD, noncompliance, paroxysmal A-fib, CAD who presented to the hospital with worsening shortness of breath.  Patient noted to be living at hotel, and noted to have a worsening dry cough and increased phlegm production.  Presented to the emergency room with elevated BNP as well as UTI.  Patient also had a remote cerebellar infarct, and lab work showed significant hyponatremia secondary to fluid overload.  Patient was given tolvaptan, and admitted for further management and supportive care.  Patient was difficult to diurese due to underlying CKD, however improved to supportive care IV diuresis and was transitioned to oral loop diuretics.  Patient was subsequently transition to SNF in stable condition.  On evaluation, patient states that he feels significantly fluid overloaded today.  Feels like his legs are much more swollen than usual.  Secondary to this does feel more short of breath than usual.  Blood pressure is slightly low as well.  Otherwise states no additional issues or concerns.        Review of Systems   Constitutional:  Positive for fatigue.   HENT:  Positive for congestion.    Respiratory:  Positive for cough and shortness of breath.    Cardiovascular: Negative.    Gastrointestinal:  Positive for abdominal distention.   Musculoskeletal: Negative.    Skin: Negative.    Neurological:  Positive for weakness.   Psychiatric/Behavioral: Negative.         ObjectiveVisit Vitals  Smoking Status Every Day    Vitals Reviewed via facility EMR   Physical Exam  Constitutional:       General: He is not in acute distress.     Appearance: He is not ill-appearing.   Eyes:      Pupils: Pupils are equal, round, and reactive to light.   Cardiovascular:      Rate and Rhythm: Normal rate and  regular rhythm.      Pulses: Normal pulses.      Heart sounds: No murmur heard.  Pulmonary:      Effort: No respiratory distress.      Breath sounds: No wheezing.      Comments: Mild decreased breath sounds  Abdominal:      General: Abdomen is flat. Bowel sounds are normal. There is no distension.   Musculoskeletal:      Right lower leg: Edema present.      Left lower leg: Edema present.   Skin:     General: Skin is warm and dry.   Neurological:      Mental Status: He is alert. Mental status is at baseline.      Cranial Nerves: No cranial nerve deficit.      Motor: Weakness present.   Psychiatric:         Mood and Affect: Mood normal.         Behavior: Behavior normal.         Assessment/Plan  Diagnoses and all orders for this visit:  Longstanding persistent atrial fibrillation (Multi)  Chronic systolic heart failure (Multi)  Acute on chronic congestive heart failure, unspecified heart failure type (Multi)  Hyponatremia  Chronic obstructive pulmonary disease, unspecified COPD type (Multi)  Hypervolemia, unspecified hypervolemia type      Patient seen and examined at bedside.  Significantly fluid overloaded, and I suspect that this is worsened since the hospitalization.  Due to underlying low blood pressure readings, initiation of midodrine as patient does need to be diuresed.  Increased Bumex to 4 mg daily.  Closely monitor electrolytes obtain repeat chest x-ray.  Encouraged low-salt diet leg elevation and fluid restriction.  Otherwise continue supportive care.  No additional issues.    Reviewed and approved by LAMONT JOHNSON on 8/24/24 at 2:05 PM.       Electronically Signed By: Lamont Johnson DO   8/24/24  2:05 PM

## 2024-08-21 ENCOUNTER — HOSPITAL ENCOUNTER (INPATIENT)
Facility: HOSPITAL | Age: 74
DRG: 291 | End: 2024-08-21
Attending: EMERGENCY MEDICINE | Admitting: INTERNAL MEDICINE
Payer: MEDICARE

## 2024-08-21 DIAGNOSIS — R09.02 HYPOXIA: ICD-10-CM

## 2024-08-21 DIAGNOSIS — J90 PLEURAL EFFUSION: ICD-10-CM

## 2024-08-21 DIAGNOSIS — I50.9 ACUTE ON CHRONIC CONGESTIVE HEART FAILURE, UNSPECIFIED HEART FAILURE TYPE (MULTI): Primary | ICD-10-CM

## 2024-08-21 LAB
ALBUMIN SERPL BCP-MCNC: 3.8 G/DL (ref 3.4–5)
ALP SERPL-CCNC: 136 U/L (ref 33–136)
ALT SERPL W P-5'-P-CCNC: 32 U/L (ref 10–52)
ANION GAP SERPL CALC-SCNC: 10 MMOL/L (ref 10–20)
AST SERPL W P-5'-P-CCNC: 29 U/L (ref 9–39)
BASOPHILS # BLD AUTO: 0.02 X10*3/UL (ref 0–0.1)
BASOPHILS NFR BLD AUTO: 0.2 %
BILIRUB SERPL-MCNC: 1.1 MG/DL (ref 0–1.2)
BNP SERPL-MCNC: 1175 PG/ML (ref 0–99)
BUN SERPL-MCNC: 20 MG/DL (ref 6–23)
CALCIUM SERPL-MCNC: 8.9 MG/DL (ref 8.6–10.3)
CARDIAC TROPONIN I PNL SERPL HS: 25 NG/L (ref 0–20)
CHLORIDE SERPL-SCNC: 86 MMOL/L (ref 98–107)
CO2 SERPL-SCNC: 42 MMOL/L (ref 21–32)
CREAT SERPL-MCNC: 0.8 MG/DL (ref 0.5–1.3)
D DIMER PPP FEU-MCNC: 1443 NG/ML FEU
EGFRCR SERPLBLD CKD-EPI 2021: >90 ML/MIN/1.73M*2
EOSINOPHIL # BLD AUTO: 0 X10*3/UL (ref 0–0.4)
EOSINOPHIL NFR BLD AUTO: 0 %
ERYTHROCYTE [DISTWIDTH] IN BLOOD BY AUTOMATED COUNT: 17 % (ref 11.5–14.5)
GLUCOSE SERPL-MCNC: 115 MG/DL (ref 74–99)
HCT VFR BLD AUTO: 33.2 % (ref 41–52)
HGB BLD-MCNC: 10.3 G/DL (ref 13.5–17.5)
IMM GRANULOCYTES # BLD AUTO: 0.07 X10*3/UL (ref 0–0.5)
IMM GRANULOCYTES NFR BLD AUTO: 0.7 % (ref 0–0.9)
LYMPHOCYTES # BLD AUTO: 0.48 X10*3/UL (ref 0.8–3)
LYMPHOCYTES NFR BLD AUTO: 5 %
MAGNESIUM SERPL-MCNC: 1.82 MG/DL (ref 1.6–2.4)
MCH RBC QN AUTO: 29.6 PG (ref 26–34)
MCHC RBC AUTO-ENTMCNC: 31 G/DL (ref 32–36)
MCV RBC AUTO: 95 FL (ref 80–100)
MONOCYTES # BLD AUTO: 1.21 X10*3/UL (ref 0.05–0.8)
MONOCYTES NFR BLD AUTO: 12.6 %
NEUTROPHILS # BLD AUTO: 7.84 X10*3/UL (ref 1.6–5.5)
NEUTROPHILS NFR BLD AUTO: 81.5 %
NRBC BLD-RTO: 0 /100 WBCS (ref 0–0)
PLATELET # BLD AUTO: 216 X10*3/UL (ref 150–450)
POTASSIUM SERPL-SCNC: 3.7 MMOL/L (ref 3.5–5.3)
PROT SERPL-MCNC: 7.5 G/DL (ref 6.4–8.2)
RBC # BLD AUTO: 3.48 X10*6/UL (ref 4.5–5.9)
SARS-COV-2 RNA RESP QL NAA+PROBE: NOT DETECTED
SODIUM SERPL-SCNC: 134 MMOL/L (ref 136–145)
WBC # BLD AUTO: 9.6 X10*3/UL (ref 4.4–11.3)

## 2024-08-21 PROCEDURE — 99285 EMERGENCY DEPT VISIT HI MDM: CPT | Mod: 25

## 2024-08-21 PROCEDURE — 99285 EMERGENCY DEPT VISIT HI MDM: CPT | Performed by: EMERGENCY MEDICINE

## 2024-08-21 PROCEDURE — 36415 COLL VENOUS BLD VENIPUNCTURE: CPT | Performed by: EMERGENCY MEDICINE

## 2024-08-21 PROCEDURE — 2500000005 HC RX 250 GENERAL PHARMACY W/O HCPCS: Performed by: EMERGENCY MEDICINE

## 2024-08-21 PROCEDURE — 71045 X-RAY EXAM CHEST 1 VIEW: CPT | Performed by: STUDENT IN AN ORGANIZED HEALTH CARE EDUCATION/TRAINING PROGRAM

## 2024-08-21 PROCEDURE — 92950 HEART/LUNG RESUSCITATION CPR: CPT | Performed by: EMERGENCY MEDICINE

## 2024-08-21 ASSESSMENT — PAIN SCALES - GENERAL: PAINLEVEL_OUTOF10: 0 - NO PAIN

## 2024-08-21 ASSESSMENT — COLUMBIA-SUICIDE SEVERITY RATING SCALE - C-SSRS
1. IN THE PAST MONTH, HAVE YOU WISHED YOU WERE DEAD OR WISHED YOU COULD GO TO SLEEP AND NOT WAKE UP?: NO
6. HAVE YOU EVER DONE ANYTHING, STARTED TO DO ANYTHING, OR PREPARED TO DO ANYTHING TO END YOUR LIFE?: NO
2. HAVE YOU ACTUALLY HAD ANY THOUGHTS OF KILLING YOURSELF?: NO

## 2024-08-21 ASSESSMENT — PAIN - FUNCTIONAL ASSESSMENT: PAIN_FUNCTIONAL_ASSESSMENT: 0-10

## 2024-08-22 PROBLEM — N30.00 ACUTE CYSTITIS WITHOUT HEMATURIA: Status: ACTIVE | Noted: 2024-01-01

## 2024-08-22 PROBLEM — I50.9 ACUTE ON CHRONIC CONGESTIVE HEART FAILURE, UNSPECIFIED HEART FAILURE TYPE (MULTI): Status: ACTIVE | Noted: 2024-01-01

## 2024-08-22 LAB
ANION GAP SERPL CALC-SCNC: 9 MMOL/L (ref 10–20)
APPEARANCE UR: CLEAR
ATRIAL RATE: 96 BPM
BILIRUB UR STRIP.AUTO-MCNC: NEGATIVE MG/DL
BUN SERPL-MCNC: 20 MG/DL (ref 6–23)
CALCIUM SERPL-MCNC: 8.6 MG/DL (ref 8.6–10.3)
CARDIAC TROPONIN I PNL SERPL HS: 26 NG/L (ref 0–20)
CHLORIDE SERPL-SCNC: 85 MMOL/L (ref 98–107)
CO2 SERPL-SCNC: 43 MMOL/L (ref 21–32)
COLOR UR: ABNORMAL
CREAT SERPL-MCNC: 0.71 MG/DL (ref 0.5–1.3)
EGFRCR SERPLBLD CKD-EPI 2021: >90 ML/MIN/1.73M*2
GLUCOSE SERPL-MCNC: 132 MG/DL (ref 74–99)
GLUCOSE UR STRIP.AUTO-MCNC: ABNORMAL MG/DL
KETONES UR STRIP.AUTO-MCNC: NEGATIVE MG/DL
LEUKOCYTE ESTERASE UR QL STRIP.AUTO: ABNORMAL
NITRITE UR QL STRIP.AUTO: NEGATIVE
PH UR STRIP.AUTO: 6 [PH]
POTASSIUM SERPL-SCNC: 3.8 MMOL/L (ref 3.5–5.3)
PROT UR STRIP.AUTO-MCNC: NEGATIVE MG/DL
Q ONSET: 209 MS
QRS COUNT: 17 BEATS
QRS DURATION: 98 MS
QT INTERVAL: 332 MS
QTC CALCULATION(BAZETT): 426 MS
QTC FREDERICIA: 392 MS
R AXIS: 90 DEGREES
RBC # UR STRIP.AUTO: NEGATIVE /UL
RBC #/AREA URNS AUTO: NORMAL /HPF
SODIUM SERPL-SCNC: 133 MMOL/L (ref 136–145)
SP GR UR STRIP.AUTO: 1.01
SQUAMOUS #/AREA URNS AUTO: NORMAL /HPF
T AXIS: -33 DEGREES
T OFFSET: 375 MS
UROBILINOGEN UR STRIP.AUTO-MCNC: ABNORMAL MG/DL
VENTRICULAR RATE: 99 BPM
WBC #/AREA URNS AUTO: NORMAL /HPF

## 2024-08-22 PROCEDURE — 2500000004 HC RX 250 GENERAL PHARMACY W/ HCPCS (ALT 636 FOR OP/ED): Performed by: PHYSICIAN ASSISTANT

## 2024-08-22 PROCEDURE — 71275 CT ANGIOGRAPHY CHEST: CPT | Mod: FOREIGN READ | Performed by: RADIOLOGY

## 2024-08-22 PROCEDURE — 2500000004 HC RX 250 GENERAL PHARMACY W/ HCPCS (ALT 636 FOR OP/ED): Performed by: EMERGENCY MEDICINE

## 2024-08-22 PROCEDURE — 2500000002 HC RX 250 W HCPCS SELF ADMINISTERED DRUGS (ALT 637 FOR MEDICARE OP, ALT 636 FOR OP/ED): Performed by: NURSE PRACTITIONER

## 2024-08-22 PROCEDURE — 2500000005 HC RX 250 GENERAL PHARMACY W/O HCPCS: Performed by: NURSE PRACTITIONER

## 2024-08-22 PROCEDURE — 2500000005 HC RX 250 GENERAL PHARMACY W/O HCPCS: Performed by: EMERGENCY MEDICINE

## 2024-08-22 PROCEDURE — 99222 1ST HOSP IP/OBS MODERATE 55: CPT | Performed by: INTERNAL MEDICINE

## 2024-08-22 PROCEDURE — 36415 COLL VENOUS BLD VENIPUNCTURE: CPT | Performed by: NURSE PRACTITIONER

## 2024-08-22 PROCEDURE — 2500000001 HC RX 250 WO HCPCS SELF ADMINISTERED DRUGS (ALT 637 FOR MEDICARE OP): Performed by: NURSE PRACTITIONER

## 2024-08-22 PROCEDURE — S4991 NICOTINE PATCH NONLEGEND: HCPCS | Performed by: NURSE PRACTITIONER

## 2024-08-22 PROCEDURE — 2500000004 HC RX 250 GENERAL PHARMACY W/ HCPCS (ALT 636 FOR OP/ED): Performed by: NURSE PRACTITIONER

## 2024-08-22 PROCEDURE — 97161 PT EVAL LOW COMPLEX 20 MIN: CPT | Mod: GP

## 2024-08-22 PROCEDURE — 97165 OT EVAL LOW COMPLEX 30 MIN: CPT | Mod: GO | Performed by: OCCUPATIONAL THERAPIST

## 2024-08-22 PROCEDURE — 36415 COLL VENOUS BLD VENIPUNCTURE: CPT | Performed by: EMERGENCY MEDICINE

## 2024-08-22 PROCEDURE — 2060000001 HC INTERMEDIATE ICU ROOM DAILY

## 2024-08-22 PROCEDURE — 96374 THER/PROPH/DIAG INJ IV PUSH: CPT

## 2024-08-22 PROCEDURE — 94640 AIRWAY INHALATION TREATMENT: CPT

## 2024-08-22 PROCEDURE — 2550000001 HC RX 255 CONTRASTS: Performed by: EMERGENCY MEDICINE

## 2024-08-22 RX ORDER — BUMETANIDE 1 MG/1
2 TABLET ORAL
Status: DISCONTINUED | OUTPATIENT
Start: 2024-08-22 | End: 2024-08-23

## 2024-08-22 RX ORDER — ACETAMINOPHEN 325 MG/1
650 TABLET ORAL EVERY 6 HOURS PRN
Status: DISCONTINUED | OUTPATIENT
Start: 2024-08-22 | End: 2024-08-26 | Stop reason: HOSPADM

## 2024-08-22 RX ORDER — LAMOTRIGINE 100 MG/1
100 TABLET ORAL DAILY
Status: DISCONTINUED | OUTPATIENT
Start: 2024-08-22 | End: 2024-08-26 | Stop reason: HOSPADM

## 2024-08-22 RX ORDER — FLUTICASONE PROPIONATE 50 MCG
2 SPRAY, SUSPENSION (ML) NASAL DAILY
Status: DISCONTINUED | OUTPATIENT
Start: 2024-08-22 | End: 2024-08-26 | Stop reason: HOSPADM

## 2024-08-22 RX ORDER — ASPIRIN 81 MG/1
81 TABLET ORAL DAILY
Status: DISCONTINUED | OUTPATIENT
Start: 2024-08-22 | End: 2024-08-26 | Stop reason: HOSPADM

## 2024-08-22 RX ORDER — CALCIUM CARBONATE 300MG(750)
400 TABLET,CHEWABLE ORAL DAILY
COMMUNITY

## 2024-08-22 RX ORDER — ISOSORBIDE MONONITRATE 30 MG/1
30 TABLET, EXTENDED RELEASE ORAL DAILY
Status: DISCONTINUED | OUTPATIENT
Start: 2024-08-22 | End: 2024-08-26 | Stop reason: HOSPADM

## 2024-08-22 RX ORDER — FORMOTEROL FUMARATE DIHYDRATE 20 UG/2ML
20 SOLUTION RESPIRATORY (INHALATION)
Status: DISCONTINUED | OUTPATIENT
Start: 2024-08-22 | End: 2024-08-26 | Stop reason: HOSPADM

## 2024-08-22 RX ORDER — TRAZODONE HYDROCHLORIDE 50 MG/1
50 TABLET ORAL NIGHTLY
Status: DISCONTINUED | OUTPATIENT
Start: 2024-08-22 | End: 2024-08-26 | Stop reason: HOSPADM

## 2024-08-22 RX ORDER — CHOLECALCIFEROL (VITAMIN D3) 25 MCG
1000 TABLET ORAL DAILY
Status: DISCONTINUED | OUTPATIENT
Start: 2024-08-22 | End: 2024-08-26 | Stop reason: HOSPADM

## 2024-08-22 RX ORDER — FUROSEMIDE 10 MG/ML
40 INJECTION INTRAMUSCULAR; INTRAVENOUS ONCE
Status: COMPLETED | OUTPATIENT
Start: 2024-08-22 | End: 2024-08-22

## 2024-08-22 RX ORDER — MIDODRINE HYDROCHLORIDE 5 MG/1
5 TABLET ORAL 3 TIMES DAILY
Status: DISCONTINUED | OUTPATIENT
Start: 2024-08-22 | End: 2024-08-23

## 2024-08-22 RX ORDER — NICOTINE 7MG/24HR
1 PATCH, TRANSDERMAL 24 HOURS TRANSDERMAL EVERY 24 HOURS
Status: DISCONTINUED | OUTPATIENT
Start: 2024-08-22 | End: 2024-08-26 | Stop reason: HOSPADM

## 2024-08-22 RX ORDER — CEFTRIAXONE 1 G/50ML
1 INJECTION, SOLUTION INTRAVENOUS EVERY 24 HOURS
Status: DISCONTINUED | OUTPATIENT
Start: 2024-08-22 | End: 2024-08-25

## 2024-08-22 RX ORDER — ACETAMINOPHEN 325 MG/1
650 TABLET ORAL EVERY 4 HOURS PRN
COMMUNITY

## 2024-08-22 RX ORDER — LANOLIN ALCOHOL/MO/W.PET/CERES
400 CREAM (GRAM) TOPICAL DAILY
Status: DISCONTINUED | OUTPATIENT
Start: 2024-08-22 | End: 2024-08-26 | Stop reason: HOSPADM

## 2024-08-22 RX ORDER — TALC
3 POWDER (GRAM) TOPICAL NIGHTLY
Status: DISCONTINUED | OUTPATIENT
Start: 2024-08-22 | End: 2024-08-26 | Stop reason: HOSPADM

## 2024-08-22 RX ORDER — AMIODARONE HYDROCHLORIDE 200 MG/1
200 TABLET ORAL DAILY
Status: DISCONTINUED | OUTPATIENT
Start: 2024-08-22 | End: 2024-08-26 | Stop reason: HOSPADM

## 2024-08-22 RX ORDER — METOPROLOL SUCCINATE 25 MG/1
25 TABLET, EXTENDED RELEASE ORAL DAILY
Status: DISCONTINUED | OUTPATIENT
Start: 2024-08-22 | End: 2024-08-26 | Stop reason: HOSPADM

## 2024-08-22 RX ORDER — CHOLECALCIFEROL (VITAMIN D3) 25 MCG
1000 TABLET ORAL DAILY
COMMUNITY

## 2024-08-22 RX ORDER — BUDESONIDE 0.5 MG/2ML
0.5 INHALANT ORAL
Status: DISCONTINUED | OUTPATIENT
Start: 2024-08-22 | End: 2024-08-26 | Stop reason: HOSPADM

## 2024-08-22 RX ORDER — ATORVASTATIN CALCIUM 20 MG/1
20 TABLET, FILM COATED ORAL NIGHTLY
Status: DISCONTINUED | OUTPATIENT
Start: 2024-08-22 | End: 2024-08-26 | Stop reason: HOSPADM

## 2024-08-22 RX ORDER — FLUTICASONE FUROATE AND VILANTEROL 200; 25 UG/1; UG/1
1 POWDER RESPIRATORY (INHALATION)
Status: DISCONTINUED | OUTPATIENT
Start: 2024-08-22 | End: 2024-08-22 | Stop reason: CLARIF

## 2024-08-22 RX ORDER — ALBUTEROL SULFATE 90 UG/1
2 INHALANT RESPIRATORY (INHALATION) EVERY 6 HOURS PRN
Status: DISCONTINUED | OUTPATIENT
Start: 2024-08-22 | End: 2024-08-22

## 2024-08-22 RX ORDER — ALBUTEROL SULFATE 0.83 MG/ML
2.5 SOLUTION RESPIRATORY (INHALATION) EVERY 4 HOURS PRN
Status: DISCONTINUED | OUTPATIENT
Start: 2024-08-22 | End: 2024-08-23

## 2024-08-22 RX ORDER — MIDODRINE HYDROCHLORIDE 5 MG/1
5 TABLET ORAL 3 TIMES DAILY
COMMUNITY

## 2024-08-22 RX ORDER — HEPARIN SODIUM 5000 [USP'U]/ML
5000 INJECTION, SOLUTION INTRAVENOUS; SUBCUTANEOUS EVERY 8 HOURS
Status: DISCONTINUED | OUTPATIENT
Start: 2024-08-22 | End: 2024-08-26 | Stop reason: HOSPADM

## 2024-08-22 SDOH — SOCIAL STABILITY: SOCIAL INSECURITY: HAS ANYONE EVER THREATENED TO HURT YOUR FAMILY OR YOUR PETS?: NO

## 2024-08-22 SDOH — SOCIAL STABILITY: SOCIAL INSECURITY: HAVE YOU HAD ANY THOUGHTS OF HARMING ANYONE ELSE?: NO

## 2024-08-22 SDOH — SOCIAL STABILITY: SOCIAL INSECURITY: ABUSE: ADULT

## 2024-08-22 SDOH — SOCIAL STABILITY: SOCIAL INSECURITY: HAVE YOU HAD THOUGHTS OF HARMING ANYONE ELSE?: NO

## 2024-08-22 SDOH — SOCIAL STABILITY: SOCIAL INSECURITY: ARE THERE ANY APPARENT SIGNS OF INJURIES/BEHAVIORS THAT COULD BE RELATED TO ABUSE/NEGLECT?: NO

## 2024-08-22 SDOH — SOCIAL STABILITY: SOCIAL INSECURITY: WERE YOU ABLE TO COMPLETE ALL THE BEHAVIORAL HEALTH SCREENINGS?: YES

## 2024-08-22 SDOH — SOCIAL STABILITY: SOCIAL INSECURITY: DOES ANYONE TRY TO KEEP YOU FROM HAVING/CONTACTING OTHER FRIENDS OR DOING THINGS OUTSIDE YOUR HOME?: NO

## 2024-08-22 SDOH — SOCIAL STABILITY: SOCIAL INSECURITY: DO YOU FEEL UNSAFE GOING BACK TO THE PLACE WHERE YOU ARE LIVING?: NO

## 2024-08-22 SDOH — SOCIAL STABILITY: SOCIAL INSECURITY: ARE YOU OR HAVE YOU BEEN THREATENED OR ABUSED PHYSICALLY, EMOTIONALLY, OR SEXUALLY BY ANYONE?: NO

## 2024-08-22 SDOH — SOCIAL STABILITY: SOCIAL INSECURITY: DO YOU FEEL ANYONE HAS EXPLOITED OR TAKEN ADVANTAGE OF YOU FINANCIALLY OR OF YOUR PERSONAL PROPERTY?: NO

## 2024-08-22 ASSESSMENT — COGNITIVE AND FUNCTIONAL STATUS - GENERAL
CLIMB 3 TO 5 STEPS WITH RAILING: TOTAL
MOBILITY SCORE: 13
PERSONAL GROOMING: A LITTLE
TURNING FROM BACK TO SIDE WHILE IN FLAT BAD: A LOT
EATING MEALS: A LITTLE
MOVING TO AND FROM BED TO CHAIR: A LITTLE
HELP NEEDED FOR BATHING: A LOT
DAILY ACTIVITIY SCORE: 14
DAILY ACTIVITIY SCORE: 13
TOILETING: A LOT
MOVING FROM LYING ON BACK TO SITTING ON SIDE OF FLAT BED WITH BEDRAILS: A LITTLE
MOBILITY SCORE: 15
WALKING IN HOSPITAL ROOM: A LOT
MOVING FROM LYING ON BACK TO SITTING ON SIDE OF FLAT BED WITH BEDRAILS: A LITTLE
STANDING UP FROM CHAIR USING ARMS: A LOT
HELP NEEDED FOR BATHING: A LOT
TOILETING: A LOT
STANDING UP FROM CHAIR USING ARMS: A LOT
TOILETING: A LOT
DRESSING REGULAR LOWER BODY CLOTHING: A LOT
MOVING TO AND FROM BED TO CHAIR: A LOT
DRESSING REGULAR UPPER BODY CLOTHING: A LOT
DRESSING REGULAR UPPER BODY CLOTHING: A LOT
STANDING UP FROM CHAIR USING ARMS: A LITTLE
PATIENT BASELINE BEDBOUND: NO
TURNING FROM BACK TO SIDE WHILE IN FLAT BAD: A LOT
MOVING FROM LYING ON BACK TO SITTING ON SIDE OF FLAT BED WITH BEDRAILS: A LITTLE
WALKING IN HOSPITAL ROOM: A LOT
TURNING FROM BACK TO SIDE WHILE IN FLAT BAD: A LITTLE
PERSONAL GROOMING: A LOT
CLIMB 3 TO 5 STEPS WITH RAILING: TOTAL
DAILY ACTIVITIY SCORE: 14
DRESSING REGULAR UPPER BODY CLOTHING: A LOT
MOVING TO AND FROM BED TO CHAIR: A LITTLE
DRESSING REGULAR LOWER BODY CLOTHING: TOTAL
MOBILITY SCORE: 14
WALKING IN HOSPITAL ROOM: A LITTLE
DRESSING REGULAR LOWER BODY CLOTHING: A LOT
PERSONAL GROOMING: A LOT
HELP NEEDED FOR BATHING: A LOT
CLIMB 3 TO 5 STEPS WITH RAILING: A LOT

## 2024-08-22 ASSESSMENT — ENCOUNTER SYMPTOMS
ABDOMINAL DISTENTION: 1
DIFFICULTY URINATING: 1
WEAKNESS: 1
CONSTIPATION: 1
SHORTNESS OF BREATH: 1
BRUISES/BLEEDS EASILY: 1
BACK PAIN: 1
ACTIVITY CHANGE: 0
DYSPHORIC MOOD: 1
SLEEP DISTURBANCE: 1
APPETITE CHANGE: 0
STRIDOR: 0
FREQUENCY: 1
WHEEZING: 1
COUGH: 0
NERVOUS/ANXIOUS: 1
PALPITATIONS: 0
ARTHRALGIAS: 1

## 2024-08-22 ASSESSMENT — PAIN - FUNCTIONAL ASSESSMENT
PAIN_FUNCTIONAL_ASSESSMENT: 0-10

## 2024-08-22 ASSESSMENT — PAIN SCALES - GENERAL
PAINLEVEL_OUTOF10: 0 - NO PAIN

## 2024-08-22 ASSESSMENT — ACTIVITIES OF DAILY LIVING (ADL)
ADEQUATE_TO_COMPLETE_ADL: YES
LACK_OF_TRANSPORTATION: YES
WALKS IN HOME: NEEDS ASSISTANCE
BATHING: NEEDS ASSISTANCE
JUDGMENT_ADEQUATE_SAFELY_COMPLETE_DAILY_ACTIVITIES: YES
PATIENT'S MEMORY ADEQUATE TO SAFELY COMPLETE DAILY ACTIVITIES?: YES
HEARING - LEFT EAR: DIFFICULTY WITH NOISE
HEARING - RIGHT EAR: DIFFICULTY WITH NOISE
TOILETING: NEEDS ASSISTANCE
DRESSING YOURSELF: NEEDS ASSISTANCE
GROOMING: NEEDS ASSISTANCE
FEEDING YOURSELF: NEEDS ASSISTANCE

## 2024-08-22 ASSESSMENT — LIFESTYLE VARIABLES
AUDIT-C TOTAL SCORE: 8
HOW OFTEN DO YOU HAVE A DRINK CONTAINING ALCOHOL: 4 OR MORE TIMES A WEEK
HOW MANY STANDARD DRINKS CONTAINING ALCOHOL DO YOU HAVE ON A TYPICAL DAY: 3 OR 4
AUDIT-C TOTAL SCORE: 8
SKIP TO QUESTIONS 9-10: 0
HOW OFTEN DO YOU HAVE 6 OR MORE DRINKS ON ONE OCCASION: WEEKLY

## 2024-08-22 ASSESSMENT — PATIENT HEALTH QUESTIONNAIRE - PHQ9
2. FEELING DOWN, DEPRESSED OR HOPELESS: NEARLY EVERY DAY
1. LITTLE INTEREST OR PLEASURE IN DOING THINGS: NEARLY EVERY DAY
SUM OF ALL RESPONSES TO PHQ9 QUESTIONS 1 & 2: 6

## 2024-08-22 NOTE — CONSULTS
"Consults    Cardiology Consult Note      Date:   8/22/2024  Patient name:  Milton Lane  Date of admission:  8/21/2024 10:34 PM  MRN:   [id number]  YOB: 1950  Time of Consult:  4:58 PM    Consulting Cardiologist: Dr. Denisse Payne    Primary Cardiologist:   None     Referring Provider: Dr. Anthony      Admission Diagnosis:     Acute on chronic congestive heart failure, unspecified heart failure type (Multi)    History of Present Illness:      Milton Lane is a 74 y.o.  male patient who is being at the request of Dr. Anthony for inpatient consultation of CHF. He was admitted on 8/21/2024.  Previous [facility] and [facility] records have been reviewed in detail.      Called to see patient for CHF exacerbation. Patient discharged from this facility 2 weeks ago for same. At that time he had lost power at his \"apartment\" and was unable to get his supplemental oxygen from the oxygen concentrator. He had mild worsening of his chronic LE. Was diuresed over almost 2 weeks prior to DC. Refused DC to rehab facility.     This time states that the nurse checked his oxygen and it was low so they sent him by ambulance to the ER. Has urinated a lot since his presentation . Had not noted any worsening LE edema. Has not weighed himself since DC. Has chronic cough which is unchanged. Has chronic SOB, chronic 2-3 pillow orthopnea. No palpitations, dizziness or lightheadedness. No chest pain or discomfort.     PMH:  CAD  Chronic systolic and diastolic CHF  Chronic venous insufficiency  Atrial fibrillation on chronic anticoagulation  COPD on chronic oxygen therapy.   Aortic stenosis  HTN  HLD  Bipolar disorder / Depression / PTSD  Anemia    Allergies:     Allergies   Allergen Reactions    Flu Vaccine 2011-12(3 Yr+)(Pf) Unknown    Losartan Unknown       Past Medical History:     Past Medical History:   Diagnosis Date    Bipolar 1 disorder (Multi)     Chronic systolic heart failure (Multi) 12/15/2023    COPD (chronic " obstructive pulmonary disease) (Multi)     Coronary artery disease involving native coronary artery of native heart without angina pectoris 12/15/2023    Hypertension     Iron deficiency anemia     Nicotine dependence, uncomplicated 12/15/2023    Nonrheumatic aortic valve stenosis 12/15/2023    Obese     PAF (paroxysmal atrial fibrillation) (Multi)     PTSD (post-traumatic stress disorder)        Past Surgical History:     History reviewed. No pertinent surgical history.    Family History:     No family history on file.    Social History:     Social History     Socioeconomic History    Marital status: Single   Tobacco Use    Smoking status: Every Day     Types: Cigarettes    Smokeless tobacco: Never    Tobacco comments:     Smokes 6 cigarettes per day.   Substance and Sexual Activity    Alcohol use: Yes     Comment: 2 cases of beer per week.    Drug use: Never    Sexual activity: Defer     Social Determinants of Health     Financial Resource Strain: Medium Risk (8/22/2024)    Overall Financial Resource Strain (CARDIA)     Difficulty of Paying Living Expenses: Somewhat hard   Food Insecurity: Food Insecurity Present (8/8/2024)    Hunger Vital Sign     Worried About Running Out of Food in the Last Year: Sometimes true     Ran Out of Food in the Last Year: Sometimes true   Transportation Needs: Unmet Transportation Needs (8/22/2024)    PRAPARE - Transportation     Lack of Transportation (Medical): Yes     Lack of Transportation (Non-Medical): Yes   Housing Stability: Low Risk  (8/22/2024)    Housing Stability Vital Sign     Unable to Pay for Housing in the Last Year: No     Number of Times Moved in the Last Year: 1     Homeless in the Last Year: No       Home Medications:     Prior to Admission medications    Medication Sig Start Date End Date Taking? Authorizing Provider   albuterol 2.5 mg /3 mL (0.083 %) nebulizer solution Take 3 mL (2.5 mg) by nebulization every 4 hours if needed for shortness of breath or wheezing.    Yes Historical Provider, MD   amiodarone (Pacerone) 200 mg tablet Take 1 tablet (200 mg) by mouth once daily. 6/20/24  Yes Fernando Kahn MD   aspirin 81 mg EC tablet Take 1 tablet (81 mg) by mouth once daily.   Yes Historical Provider, MD   atorvastatin (Lipitor) 20 mg tablet Take 1 tablet (20 mg) by mouth once daily. 6/19/24  Yes Fernando Kahn MD   bumetanide (Bumex) 2 mg tablet Take 1 tablet (2 mg) by mouth 2 times daily (morning and late afternoon).  Patient taking differently: Take 2 tablets (4 mg) by mouth once daily. 8/14/24 9/13/24 Yes DAVID Pemberton-CNP   cephalexin (Keflex) 500 mg capsule Take 1 capsule (500 mg) by mouth every 8 hours for 21 doses. END DATE: 8/22/24  Patient taking differently: Take 1 capsule (500 mg) by mouth every 8 hours. END DATE: 8/23/24 8/15/24 8/22/24 Yes Mylene Esparza PA-C   cholecalciferol (Vitamin D-3) 25 MCG (1000 UT) tablet Take 1 tablet (1,000 Units) by mouth once daily.   Yes Historical Provider, MD   empagliflozin (Jardiance) 25 mg Take 0.5 tablets (12.5 mg) by mouth once daily.   Yes Historical Provider, MD   fluticasone (Flonase) 50 mcg/actuation nasal spray Administer 2 sprays into each nostril once daily. Shake gently. Before first use, prime pump. After use, clean tip and replace cap. 6/19/24  Yes Fernando Kahn MD   fluticasone propion-salmeteroL (Advair Diskus) 250-50 mcg/dose diskus inhaler Inhale 1 puff 2 times a day.   Yes Historical Provider, MD   isosorbide mononitrate ER (Imdur) 30 mg 24 hr tablet Take 1 tablet (30 mg) by mouth once daily. Do not crush or chew. 6/19/24  Yes Fernando Kahn MD   lamoTRIgine (LaMICtal) 100 mg tablet Take 1 tablet (100 mg) by mouth once daily.   Yes Historical Provider, MD   lubricating eye drops ophthalmic solution Administer 1 drop into both eyes every 4 hours if needed for dry eyes.   Yes Historical Provider, MD   magnesium oxide (Mag-Ox) 400 mg tablet Take 1 tablet (400 mg) by mouth once daily.   Yes  Historical Provider, MD   melatonin 3 mg capsule Take 3 mg by mouth once daily at bedtime.   Yes Historical Provider, MD   metoprolol succinate XL (Toprol-XL) 25 mg 24 hr tablet Take 1 tablet (25 mg) by mouth once daily. Do not crush or chew. 6/19/24  Yes Fernando Kahn MD   midodrine (Proamatine) 5 mg tablet Take 1 tablet (5 mg) by mouth 3 times a day. Hold for sbp>120   Yes Historical Provider, MD   nicotine (Nicoderm CQ) 7 mg/24 hr patch Place 1 patch on the skin once every 24 hours.   Yes Historical Provider, MD   traZODone (Desyrel) 50 mg tablet Take 1 tablet (50 mg) by mouth once daily at bedtime.   Yes Historical Provider, MD   acetaminophen (Tylenol) 325 mg tablet Take 2 tablets (650 mg) by mouth every 4 hours if needed for mild pain (1 - 3).    Historical Provider, MD   albuterol (Ventolin HFA) 90 mcg/actuation inhaler Inhale 2 puffs every 6 hours if needed for shortness of breath or wheezing.    Historical Provider, MD   nicotine polacrilex (Commit) 2 mg lozenge Use 1 lozenge (2 mg) in the mouth or throat if needed for smoking cessation.    Historical Provider, MD   oxygen (O2) gas therapy Inhale 1 each continuously. 6/19/24   Fernando Kahn MD       Current Medications:     Current Outpatient Medications   Medication Instructions    acetaminophen (TYLENOL) 650 mg, oral, Every 4 hours PRN    albuterol (Ventolin HFA) 90 mcg/actuation inhaler 2 puffs, inhalation, Every 6 hours PRN    albuterol 2.5 mg, nebulization, Every 4 hours PRN    amiodarone (PACERONE) 200 mg, oral, Daily    aspirin 81 mg, oral, Daily    atorvastatin (LIPITOR) 20 mg, oral, Daily    bumetanide (BUMEX) 2 mg, oral, 2 times daily (morning and late afternoon)    cephalexin (KEFLEX) 500 mg, oral, Every 8 hours scheduled, END DATE: 8/22/24    cholecalciferol (VITAMIN D-3) 1,000 Units, oral, Daily    empagliflozin (JARDIANCE) 12.5 mg, oral, Daily    fluticasone (Flonase) 50 mcg/actuation nasal spray 2 sprays, Each Nostril, Daily, Shake  gently. Before first use, prime pump. After use, clean tip and replace cap.    fluticasone propion-salmeteroL (Advair Diskus) 250-50 mcg/dose diskus inhaler 1 puff, inhalation, 2 times daily RT    isosorbide mononitrate ER (IMDUR) 30 mg, oral, Daily, Do not crush or chew.    lamoTRIgine (LAMICTAL) 100 mg, oral, Daily    lubricating eye drops ophthalmic solution 1 drop, Both Eyes, Every 4 hours PRN    magnesium oxide (MAG-OX) 400 mg, oral, Daily    melatonin 3 mg, oral, Nightly    metoprolol succinate XL (TOPROL-XL) 25 mg, oral, Daily, Do not crush or chew.    midodrine (PROAMATINE) 5 mg, oral, 3 times daily, Hold for sbp>120    nicotine (Nicoderm CQ) 7 mg/24 hr patch 1 patch, transdermal, Every 24 hours    nicotine polacrilex (COMMIT) 2 mg, Mouth/Throat, As needed    oxygen (O2) gas therapy 1 each, inhalation, Continuous    traZODone (DESYREL) 50 mg, oral, Nightly        IV Medications:          Review of Systems:      Review of Systems   Constitutional:  Negative for activity change and appetite change.   HENT:  Positive for congestion and hearing loss.    Respiratory:  Positive for shortness of breath and wheezing. Negative for cough and stridor.    Cardiovascular:  Positive for leg swelling. Negative for chest pain and palpitations.   Gastrointestinal:  Positive for abdominal distention and constipation.   Genitourinary:  Positive for difficulty urinating and frequency.   Musculoskeletal:  Positive for arthralgias and back pain.   Neurological:  Positive for weakness.   Hematological:  Bruises/bleeds easily.   Psychiatric/Behavioral:  Positive for dysphoric mood and sleep disturbance. The patient is nervous/anxious.    All other systems reviewed and are negative.      Vital Signs:     Vitals:    08/22/24 0700 08/22/24 0916 08/22/24 1200 08/22/24 1358   BP: 127/70  136/77    BP Location: Right arm  Left arm    Patient Position: Sitting  Sitting    Pulse: 93      Resp:       Temp: 36.6 °C (97.9 °F)  36.2 °C (97.2  °F)    TempSrc: Temporal  Temporal    SpO2:    97%   Weight:  98.1 kg (216 lb 4.3 oz)     Height:           Intake/Output Summary (Last 24 hours) at 8/22/2024 1658  Last data filed at 8/22/2024 1049  Gross per 24 hour   Intake 50 ml   Output --   Net 50 ml     Vitals:    08/22/24 0916   Weight: 98.1 kg (216 lb 4.3 oz)       Physical Examination:   GENERAL: Chronically ill appearing man, Well developed, well nourished, in no acute distress.  HEENT: Partially edentulous  NECK:  JVD 7 cm above RA, no bruit. Thyroid not palpable. Carotid upstrokes normal.  CHEST: Symmetric and non-tender.  LUNGS: Mild tachypnea without accessory muscle use. Limited aeration bilateral lower lobes. R>L basilar rales. Bilateral coarse rhonchi. Prolonged exp phase. NO wheeze.   HEART: PMI is nonpalpable, Irregularly irregular with normal S1 and S2, no appreciable S3. II/VI jeramy/dec murmur at RUSB, no diastolic murmur appreciated. Bilateral LE edema that appears unchanged from DC. Chronic skin changes.   ABDOMEN: Obese, soft, nontender, no palpable hepatosplenomegaly, no mases, no bruits. Abdominal aorta not palpable.   MUSCULOSKELETAL: OA changes, patient with limited ambulation  EXTREMITIES: Warm with good color, no clubbing or cyanois. There is 2-3+  edema noted.  NEURO/PSHCY: Alert and oriented x3; with no focal motor deficits.   INTEGUMENT: Skin warm and dry, chronic changes bilateral LE.   LYMPH: No significant palpable lymphadenopathy    Lab:     CBC:   Lab Results   Component Value Date    WBC 9.6 08/21/2024    RBC 3.48 (L) 08/21/2024    HGB 10.3 (L) 08/21/2024    HCT 33.2 (L) 08/21/2024     08/21/2024        CMP:    Lab Results   Component Value Date     (L) 08/22/2024    K 3.8 08/22/2024    CL 85 (L) 08/22/2024    CO2 43 (HH) 08/22/2024    BUN 20 08/22/2024    CREATININE 0.71 08/22/2024    GLUCOSE 132 (H) 08/22/2024    CALCIUM 8.6 08/22/2024       Magnesium:    Lab Results   Component Value Date    MG 1.82 08/21/2024  "      Lipid Profile:    No results found for: \"CHLPL\", \"TRIG\", \"HDL\", \"LDLCALC\", \"LDLDIRECT\"    TSH:    No results found for: \"TSH\"    BNP:   Lab Results   Component Value Date    BNP 1,175 (H) 08/21/2024        PT/INR:    No results found for: \"PROTIME\", \"INR\"    HgBA1c:    Lab Results   Component Value Date    HGBA1C 5.5 01/08/2023       BMP:  Lab Results   Component Value Date     (L) 08/22/2024     (L) 08/21/2024    K 3.8 08/22/2024    K 3.7 08/21/2024    CL 85 (L) 08/22/2024    CL 86 (L) 08/21/2024    CO2 43 (HH) 08/22/2024    CO2 42 (HH) 08/21/2024    BUN 20 08/22/2024    BUN 20 08/21/2024    CREATININE 0.71 08/22/2024    CREATININE 0.80 08/21/2024       CBC:  Lab Results   Component Value Date    WBC 9.6 08/21/2024    RBC 3.48 (L) 08/21/2024    HGB 10.3 (L) 08/21/2024    HCT 33.2 (L) 08/21/2024    MCV 95 08/21/2024    MCH 29.6 08/21/2024    MCHC 31.0 (L) 08/21/2024    RDW 17.0 (H) 08/21/2024     08/21/2024       Cardiac Enzymes:    Lab Results   Component Value Date    TROPHS 26 (H) 08/22/2024    TROPHS 25 (H) 08/21/2024    TROPHS 132 (HH) 08/07/2024       Hepatic Function Panel:    Lab Results   Component Value Date    ALKPHOS 136 08/21/2024    ALT 32 08/21/2024    AST 29 08/21/2024    PROT 7.5 08/21/2024    BILITOT 1.1 08/21/2024         Diagnostic Studies:     ECG 12 lead    Result Date: 8/22/2024  Atrial fibrillation with premature ventricular or aberrantly conducted complexes Rightward axis Possible Inferior infarct , age undetermined Abnormal ECG When compared with ECG of 07-AUG-2024 13:19, Previous ECG has undetermined rhythm, needs review Borderline criteria for Inferior infarct are now Present Inverted T waves have replaced nonspecific T wave abnormality in Inferior leads Nonspecific T wave abnormality now evident in Lateral leads QT has shortened    CT angio chest for pulmonary embolism    Result Date: 8/22/2024  STUDY: CT Angiogram of the Chest; 08/22/2024, 1:39 AM INDICATION: " Shortness of breath, hypoxia, evaluate for a PE. COMPARISON: CTA chest 01/08/2023. ACCESSION NUMBER(S): [id number] ORDERING CLINICIAN: VINEET JOHNSON TECHNIQUE:  CTA of the chest was performed with intravenous contrast. Images are reviewed and processed at a workstation according to the CT angiogram protocol with 3-D and/or MIP post processing imaging generated.  Omnipaque 350, 75 mL was administered intravenously. Automated mA/kV exposure control was utilized and patient examination was performed in strict accordance with principles of ALARA. FINDINGS: Pulmonary arteries are adequately opacified without acute or chronic filling defects.  The thoracic aorta is normal in course and caliber without dissection or aneurysm. There is moderate cardiomegaly.  There is borderline enlargement of the mediastinal lymph nodes most likely due to congestion. There is no pleural effusion, pleural thickening, or pneumothorax. The airways are patent. Small left and moderate right pleural effusion. There is compressive atelectasis in the right middle lobe and right lower lobe.  There is diffuse interstitial thickening both lungs suggesting pulmonary edema. Upper abdomen demonstrates no acute pathology. There are no acute fractures.  No suspicious bony lesions.    1.No evidence of pulmonary embolism. 2.Findings most consistent with congestive heart failure. Signed by Tyrone Ruiz MD    XR chest 1 view    Result Date: 8/22/2024  Interpreted By:  Bryon Ruff, STUDY: XR CHEST 1 VIEW;  8/21/2024 11:22 pm   INDICATION: Signs/Symptoms:SOB.   COMPARISON: CXR 08/07/2024   ACCESSION NUMBER(S): [id number]   ORDERING CLINICIAN: VINEET JOHNSON   FINDINGS:     CARDIOMEDIASTINAL SILHOUETTE: Cardiomediastinal silhouette is stable in size and configuration.   LUNGS: Bilateral small pleural effusions. Bibasilar consolidations, similar to prior. Background of pulmonary edema.   ABDOMEN: No remarkable upper abdominal findings.   BONES:  No acute osseous abnormality.       Findings are similar to 08/07/2024. There is background of pulmonary edema and bilateral small pleural effusions with adjacent lower lobe consolidations which may reflect compressive atelectasis but superimposed infection or aspiration is difficult to exclude.   MACRO: None   Signed by: Bryon Ruff 8/22/2024 12:18 AM Dictation workstation:   [id number]      Radiology:     CT angio chest for pulmonary embolism   Final Result   1.No evidence of pulmonary embolism.   2.Findings most consistent with congestive heart failure.   Signed by Tyrone Ruiz MD      XR chest 1 view   Final Result   Findings are similar to 08/07/2024. There is background of pulmonary   edema and bilateral small pleural effusions with adjacent lower lobe   consolidations which may reflect compressive atelectasis but   superimposed infection or aspiration is difficult to exclude.        MACRO:   None        Signed by: Bryon Ruff 8/22/2024 12:18 AM   Dictation workstation:   [id number]          Assessment:     Problem List Items Addressed This Visit       * (Principal) Acute on chronic congestive heart failure, unspecified heart failure type (Multi) - Primary    Relevant Medications    midodrine (Proamatine) 5 mg tablet    isosorbide mononitrate ER (Imdur) 24 hr tablet 30 mg    midodrine (Proamatine) tablet 5 mg    metoprolol succinate XL (Toprol-XL) 24 hr tablet 25 mg     Other Visit Diagnoses       Hypoxia        Pleural effusion                Patient Active Problem List   Diagnosis    Coronary artery disease involving native coronary artery of native heart without angina pectoris    Chronic systolic heart failure (Multi)    Nicotine dependence, uncomplicated    Nonrheumatic aortic valve stenosis    Shortness of breath    Acute on chronic combined systolic (congestive) and diastolic (congestive) heart failure (Multi)    Hyponatremia    Hypervolemia    Acute on chronic hypoxic respiratory failure  (Multi)    COPD (chronic obstructive pulmonary disease) (Multi)    Abnormal urinalysis    Acute on chronic congestive heart failure, unspecified heart failure type (Multi)    Acute cystitis without hematuria       Plan:     Acute on chronic systolic and diastolic CHF. Mild decompensation. Patient changed his home diuretic therapy. Recommend resume appropriate Rx of Bumex 2 mg bid. If insufficient would change to torsemide. Keep remainder medication the same.   CAD - stable without angina, continue conservative Rx.   Chronic atrial fibrillation - rates controlled.   Aortic stenosis - mild with mild/mod regurgitation by echo. NO NEED TO REPEAT ECHO.   SOB/hypoxia - likely multifactorial and includes COPD.     No cardiac testing. Unclear if patient home with [facility] or at ECF. If at ECF there needs to be closer monitoring.     Thank you for the opportunity to participate in the care of your patient.  Please do not hesitate to call if you have any questions.    Electronically signed by Denisse Payne MD, on 8/22/2024 at 4:58 PM

## 2024-08-22 NOTE — H&P
"History Of Present Illness  Milton Lane is a 74 y.o. male presenting with SOB and hypoxia fro NH, he was ecwnrly admitted here from home for CHF exacerbation. And was Dc'd to his facility.reportedly he was found to be dysneic with oxygen saturations in the 70's while on 4 l/N He denied chest pain or palpitations. He does c/o having a \"swollen prostate\" and sts his urinary frquency has worsenrd to the point that by the time he is able to stand at his bedside to use a urinal, gets settled back into bed, hehas th urge to go agaiin within minuts, and after four or five he has no energy, is out of breath and feels like he will slump to the floor. [facility] is unsure if he has been seen, or trated by a urologist?His oxygennation improved on a NRB, by ems, he is now on High Flow NC at 10 liters and maintaining sats in the mid nineties. He was given a dose of lasix in ed     Past Medical History  He has a past medical history of Bipolar 1 disorder (Multi), Chronic systolic heart failure (Multi) (12/15/2023), COPD (chronic obstructive pulmonary disease) (Multi), Coronary artery disease involving native coronary artery of native heart without angina pectoris (12/15/2023), Hypertension, Iron deficiency anemia, Nicotine dependence, uncomplicated (12/15/2023), Nonrheumatic aortic valve stenosis (12/15/2023), Obese, PAF (paroxysmal atrial fibrillation) (Multi), and PTSD (post-traumatic stress disorder).    Surgical History  He has no past surgical history on file.     Social History  He reports that he has been smoking cigarettes. He has never used smokeless tobacco. He reports current alcohol use. He reports that he does not use drugs.    Family History  No family history on file.     Allergies  Flu vaccine 2011-12(3 yr+)(pf) and Losartan    Review of Systems     Physical Exam  Alert and oriented x 3, still slightly dyspneic, though much improved since his arrival  On 10 liters oxygen via HFNC  Neck is supple " no JVD or bruits noted, trachea is midline  Chest diminished BS at both bases, with fine rales, Heart irreg rhytm, rate controlled  Abdomen is soft and non tender, extremities with chroni stasis changes and 3+ edema bilat     Last Recorded Vitals  /59 (BP Location: Left arm, Patient Position: Sitting)   Pulse 88   Temp 36.9 °C (98.4 °F) (Temporal)   Resp (!) 21   Wt 91 kg (200 lb 9.9 oz)   SpO2 97%     Relevant Results       Results for orders placed or performed during the hospital encounter of 08/21/24 (from the past 24 hour(s))   Sars-CoV-2 PCR   Result Value Ref Range    Coronavirus 2019, PCR Not Detected Not Detected   CBC and Auto Differential   Result Value Ref Range    WBC 9.6 4.4 - 11.3 x10*3/uL    nRBC 0.0 0.0 - 0.0 /100 WBCs    RBC 3.48 (L) 4.50 - 5.90 x10*6/uL    Hemoglobin 10.3 (L) 13.5 - 17.5 g/dL    Hematocrit 33.2 (L) 41.0 - 52.0 %    MCV 95 80 - 100 fL    MCH 29.6 26.0 - 34.0 pg    MCHC 31.0 (L) 32.0 - 36.0 g/dL    RDW 17.0 (H) 11.5 - 14.5 %    Platelets 216 150 - 450 x10*3/uL    Neutrophils % 81.5 40.0 - 80.0 %    Immature Granulocytes %, Automated 0.7 0.0 - 0.9 %    Lymphocytes % 5.0 13.0 - 44.0 %    Monocytes % 12.6 2.0 - 10.0 %    Eosinophils % 0.0 0.0 - 6.0 %    Basophils % 0.2 0.0 - 2.0 %    Neutrophils Absolute 7.84 (H) 1.60 - 5.50 x10*3/uL    Immature Granulocytes Absolute, Automated 0.07 0.00 - 0.50 x10*3/uL    Lymphocytes Absolute 0.48 (L) 0.80 - 3.00 x10*3/uL    Monocytes Absolute 1.21 (H) 0.05 - 0.80 x10*3/uL    Eosinophils Absolute 0.00 0.00 - 0.40 x10*3/uL    Basophils Absolute 0.02 0.00 - 0.10 x10*3/uL   Comprehensive metabolic panel   Result Value Ref Range    Glucose 115 (H) 74 - 99 mg/dL    Sodium 134 (L) 136 - 145 mmol/L    Potassium 3.7 3.5 - 5.3 mmol/L    Chloride 86 (L) 98 - 107 mmol/L    Bicarbonate 42 (HH) 21 - 32 mmol/L    Anion Gap 10 10 - 20 mmol/L    Urea Nitrogen 20 6 - 23 mg/dL    Creatinine 0.80 0.50 - 1.30 mg/dL    eGFR >90 >60 mL/min/1.73m*2    Calcium 8.9  8.6 - 10.3 mg/dL    Albumin 3.8 3.4 - 5.0 g/dL    Alkaline Phosphatase 136 33 - 136 U/L    Total Protein 7.5 6.4 - 8.2 g/dL    AST 29 9 - 39 U/L    Bilirubin, Total 1.1 0.0 - 1.2 mg/dL    ALT 32 10 - 52 U/L   Magnesium   Result Value Ref Range    Magnesium 1.82 1.60 - 2.40 mg/dL   B-type natriuretic peptide   Result Value Ref Range    BNP 1,175 (H) 0 - 99 pg/mL   D-Dimer, VTE Exclusion   Result Value Ref Range    D-Dimer, Quantitative VTE Exclusion 1,443 (H) <=500 ng/mL FEU   Troponin I, High Sensitivity, Initial   Result Value Ref Range    Troponin I, High Sensitivity 25 (H) 0 - 20 ng/L   Urinalysis with Reflex Microscopic   Result Value Ref Range    Color, Urine Light-Yellow Light-Yellow, Yellow, Dark-Yellow    Appearance, Urine Clear Clear    Specific Gravity, Urine 1.014 1.005 - 1.035    pH, Urine 6.0 5.0, 5.5, 6.0, 6.5, 7.0, 7.5, 8.0    Protein, Urine NEGATIVE NEGATIVE, 10 (TRACE), 20 (TRACE) mg/dL    Glucose, Urine OVER (4+) (A) Normal mg/dL    Blood, Urine NEGATIVE NEGATIVE    Ketones, Urine NEGATIVE NEGATIVE mg/dL    Bilirubin, Urine NEGATIVE NEGATIVE    Urobilinogen, Urine 2 (1+) (A) Normal mg/dL    Nitrite, Urine NEGATIVE NEGATIVE    Leukocyte Esterase, Urine 75 Constance/µL (A) NEGATIVE   Microscopic Only, Urine   Result Value Ref Range    WBC, Urine 1-5 1-5, NONE /HPF    RBC, Urine 3-5 NONE, 1-2, 3-5 /HPF    Squamous Epithelial Cells, Urine 1-9 (SPARSE) Reference range not established. /HPF   Troponin, High Sensitivity, 1 Hour   Result Value Ref Range    Troponin I, High Sensitivity 26 (H) 0 - 20 ng/L      XR chest 1 view 08/21/2024    Narrative  Interpreted By:  Bryon Ruff,  STUDY:  XR CHEST 1 VIEW;  8/21/2024 11:22 pm    INDICATION:  Signs/Symptoms:SOB.    COMPARISON:  CXR 08/07/2024    ACCESSION NUMBER(S):  [id number]    ORDERING CLINICIAN:  VINEET ROMANELLO    FINDINGS:      CARDIOMEDIASTINAL SILHOUETTE:  Cardiomediastinal silhouette is stable in size and configuration.    LUNGS:  Bilateral small  pleural effusions. Bibasilar consolidations, similar  to prior. Background of pulmonary edema.    ABDOMEN:  No remarkable upper abdominal findings.    BONES:  No acute osseous abnormality.    Impression  Findings are similar to 08/07/2024. There is background of pulmonary  edema and bilateral small pleural effusions with adjacent lower lobe  consolidations which may reflect compressive atelectasis but  superimposed infection or aspiration is difficult to exclude.    MACRO:  None    Signed by: Bryon Ruff 8/22/2024 12:18 AM  Dictation workstation:   [id number]     Current Facility-Administered Medications:     oxygen (O2) therapy, , inhalation, Continuous - Inhalation, Kayden Bonilla DO, 10 L/min at 08/21/24 2300    Current Outpatient Medications:     albuterol (Ventolin HFA) 90 mcg/actuation inhaler, Inhale 2 puffs every 6 hours if needed for shortness of breath or wheezing., Disp: , Rfl:     albuterol 2.5 mg /3 mL (0.083 %) nebulizer solution, Take 3 mL (2.5 mg) by nebulization every 4 hours if needed for shortness of breath or wheezing., Disp: , Rfl:     amiodarone (Pacerone) 200 mg tablet, Take 1 tablet (200 mg) by mouth once daily., Disp: 30 tablet, Rfl: 0    aspirin 81 mg EC tablet, Take 1 tablet (81 mg) by mouth once daily., Disp: , Rfl:     atorvastatin (Lipitor) 20 mg tablet, Take 1 tablet (20 mg) by mouth once daily., Disp: 30 tablet, Rfl: 0    bumetanide (Bumex) 2 mg tablet, Take 1 tablet (2 mg) by mouth 2 times daily (morning and late afternoon)., Disp: , Rfl:     cephalexin (Keflex) 500 mg capsule, Take 1 capsule (500 mg) by mouth every 8 hours for 21 doses. END DATE: 8/22/24, Disp: , Rfl:     empagliflozin (Jardiance) 25 mg, Take 0.5 tablets (12.5 mg) by mouth once daily., Disp: , Rfl:     fluticasone (Flonase) 50 mcg/actuation nasal spray, Administer 2 sprays into each nostril once daily. Shake gently. Before first use, prime pump. After use, clean tip and replace cap., Disp: 16 g, Rfl: 1     fluticasone propion-salmeteroL (Advair Diskus) 250-50 mcg/dose diskus inhaler, Inhale 1 puff 2 times a day. Rinse mouth with water after use to reduce aftertaste and incidence of candidiasis. Do not swallow., Disp: , Rfl:     isosorbide mononitrate ER (Imdur) 30 mg 24 hr tablet, Take 1 tablet (30 mg) by mouth once daily. Do not crush or chew., Disp: 30 tablet, Rfl: 0    lamoTRIgine (LaMICtal) 100 mg tablet, Take 1 tablet (100 mg) by mouth once daily., Disp: , Rfl:     melatonin 3 mg capsule, Take 6 mg by mouth once daily at bedtime., Disp: , Rfl:     metoprolol succinate XL (Toprol-XL) 25 mg 24 hr tablet, Take 1 tablet (25 mg) by mouth once daily. Do not crush or chew., Disp: 30 tablet, Rfl: 0    nicotine (Nicoderm CQ) 7 mg/24 hr patch, Place 1 patch on the skin once every 24 hours., Disp: , Rfl:     nicotine polacrilex (Commit) 2 mg lozenge, Use 1 lozenge (2 mg) in the mouth or throat if needed for smoking cessation., Disp: , Rfl:     oxygen (O2) gas therapy, Inhale 1 each continuously., Disp: , Rfl:     traZODone (Desyrel) 50 mg tablet, Take 1 tablet (50 mg) by mouth once daily at bedtime., Disp: , Rfl:         Assessment/Plan   Assessment & Plan  Acute on chronic congestive heart failure, unspecified heart failure type (Multi)      1)Acute on chronic CHF  2)COPD   3)CAD, s/p remote stent  4)Aortic Stenosis  5)Atrial Fibrillation  6)BPH  7)PTSD    Discussion: Patient seems to be improving with diuresis and increased Oxygen delivery. Previous cardiology notes reviewed re AF and not felt to be suitable for long term anti coagulation, will keep him on his ASA 81mg. Would consider a urology consult, as he relates significant exertion to repeatedly get out of bed to use urinal. Eill get a bladdr scan, and if >400cc will consider a dave to CD, as he should have strict I&O's during diuresis. He should continue his other home meds, after reconciliation completed later this morning..         Elmer Argueta PA-C

## 2024-08-22 NOTE — H&P
History Of Present Illness  Milton Lane is a 74 y.o. M with from [facility] with MMP including Chronic HfrEF, COPD, CAD, HTN, Aortic valve stenosis,obesity, BMI 31, PAF, PTSD presented to ER due to worsening SOB. Pt was recently dcd to ECF after prolong hospitalization for acute hypoxic and hypercapnic resp failure. Pt apparently was noted to have pox in 70s. He denied any CP. Pt was brought to ER from where he was admitted on CCU SD for further care.           Past Medical History  He has a past medical history of Bipolar 1 disorder (Multi), Chronic systolic heart failure (Multi) (12/15/2023), COPD (chronic obstructive pulmonary disease) (Multi), Coronary artery disease involving native coronary artery of native heart without angina pectoris (12/15/2023), Hypertension, Iron deficiency anemia, Nicotine dependence, uncomplicated (12/15/2023), Nonrheumatic aortic valve stenosis (12/15/2023), Obese, PAF (paroxysmal atrial fibrillation) (Multi), and PTSD (post-traumatic stress disorder).    Surgical History  He has no past surgical history on file.     Social History  He reports that he has been smoking cigarettes. He has never used smokeless tobacco. He reports current alcohol use. He reports that he does not use drugs.    Family History  No family history on file.     Allergies  Flu vaccine 2011-12(3 yr+)(pf) and Losartan    Current medications:      Current Facility-Administered Medications:     acetaminophen (Tylenol) tablet 650 mg, 650 mg, oral, q6h PRN, DAVID Samuel-CNP    albuterol 2.5 mg /3 mL (0.083 %) nebulizer solution 2.5 mg, 2.5 mg, nebulization, q4h PRN, DAVID Samuel-CNP    amiodarone (Pacerone) tablet 200 mg, 200 mg, oral, Daily, DEBBIE Samuel, 200 mg at 08/22/24 0948    aspirin EC tablet 81 mg, 81 mg, oral, Daily, DEBBIE Samuel, 81 mg at 08/22/24 0947    atorvastatin (Lipitor) tablet 20 mg, 20 mg, oral, Nightly, DAVID Samuel-CNP     budesonide (Pulmicort) 0.5 mg/2 mL nebulizer solution 0.5 mg, 0.5 mg, nebulization, BID, DEBBIE Samuel    bumetanide (Bumex) tablet 2 mg, 2 mg, oral, BID, DEBBIE Samuel, 2 mg at 08/22/24 0947    cefTRIAXone (Rocephin) 1 g in dextrose (iso) IV 50 mL, 1 g, intravenous, q24h, DEBBIE Samuel, Last Rate: 100 mL/hr at 08/22/24 1049, 1 g at 08/22/24 1049    cholecalciferol (Vitamin D-3) tablet 1,000 Units, 1,000 Units, oral, Daily, DEBBIE Samuel, 1,000 Units at 08/22/24 0947    empagliflozin (Jardiance) tablet 12.5 mg, 12.5 mg, oral, Daily, DEBBIE Samuel, 12.5 mg at 08/22/24 0948    fluticasone (Flonase) nasal spray 2 spray, 2 spray, Each Nostril, Daily, DEBBIE Samuel, 2 spray at 08/22/24 0955    formoterol (Perforomist) 20 mcg/2 mL nebulizer solution 20 mcg, 20 mcg, nebulization, BID, DEBBIE Samuel    heparin (porcine) injection 5,000 Units, 5,000 Units, subcutaneous, q8h, DEBBIE Samuel, 5,000 Units at 08/22/24 0750    isosorbide mononitrate ER (Imdur) 24 hr tablet 30 mg, 30 mg, oral, Daily, DEBBIE Samuel, 30 mg at 08/22/24 0948    lamoTRIgine (LaMICtal) tablet 100 mg, 100 mg, oral, Daily, DEBBIE Samuel, 100 mg at 08/22/24 0947    lubricating eye drops ophthalmic solution 1 drop, 1 drop, Both Eyes, q4h PRN, DEBBIE Samuel    magnesium oxide (Mag-Ox) tablet 400 mg, 400 mg, oral, Daily, DAVID Samuel-CNP, 400 mg at 08/22/24 0952    melatonin tablet 3 mg, 3 mg, oral, Nightly, DEBBIE Samuel    metoprolol succinate XL (Toprol-XL) 24 hr tablet 25 mg, 25 mg, oral, Daily, DEBBIE Samuel, 25 mg at 08/22/24 0948    midodrine (Proamatine) tablet 5 mg, 5 mg, oral, TID, DAVID Samuel-CNP, 5 mg at 08/22/24 0948    nicotine (Nicoderm CQ) 7 mg/24 hr patch 1 patch, 1 patch, transdermal, q24h,  "DEBBIE Samuel, 1 patch at 08/22/24 0947    oxygen (O2) therapy, , inhalation, Continuous - Inhalation, DEBBIE Samuel, 10 L/min at 08/22/24 0800    traZODone (Desyrel) tablet 50 mg, 50 mg, oral, Nightly, DEBBIE Samuel       Review of Systems     10 organ ROS is pertinent for history mentioned above, otherwise (-)ve       Physical Exam  HENT:      Head: Normocephalic.      Mouth/Throat:      Pharynx: Oropharynx is clear.   Eyes:      Conjunctiva/sclera: Conjunctivae normal.   Cardiovascular:      Rate and Rhythm: Regular rhythm.   Pulmonary:      Breath sounds: Normal breath sounds.   Abdominal:      General: Bowel sounds are normal.      Palpations: Abdomen is soft.   Musculoskeletal:         General: Normal range of motion.   Skin:     General: Skin is warm and dry.   Neurological:      General: No focal deficit present.      Mental Status: He is alert.   Psychiatric:         Behavior: Behavior normal.              Last Recorded Vitals      Blood pressure 127/70, pulse 93, temperature 36.6 °C (97.9 °F), temperature source Temporal, resp. rate 22, height 1.753 m (5' 9\"), weight 98.1 kg (216 lb 4.3 oz), SpO2 96%.    Relevant Results     Results for orders placed or performed during the hospital encounter of 08/21/24 (from the past 24 hour(s))   Sars-CoV-2 PCR   Result Value Ref Range    Coronavirus 2019, PCR Not Detected Not Detected   CBC and Auto Differential   Result Value Ref Range    WBC 9.6 4.4 - 11.3 x10*3/uL    nRBC 0.0 0.0 - 0.0 /100 WBCs    RBC 3.48 (L) 4.50 - 5.90 x10*6/uL    Hemoglobin 10.3 (L) 13.5 - 17.5 g/dL    Hematocrit 33.2 (L) 41.0 - 52.0 %    MCV 95 80 - 100 fL    MCH 29.6 26.0 - 34.0 pg    MCHC 31.0 (L) 32.0 - 36.0 g/dL    RDW 17.0 (H) 11.5 - 14.5 %    Platelets 216 150 - 450 x10*3/uL    Neutrophils % 81.5 40.0 - 80.0 %    Immature Granulocytes %, Automated 0.7 0.0 - 0.9 %    Lymphocytes % 5.0 13.0 - 44.0 %    Monocytes % 12.6 2.0 - 10.0 %    " Eosinophils % 0.0 0.0 - 6.0 %    Basophils % 0.2 0.0 - 2.0 %    Neutrophils Absolute 7.84 (H) 1.60 - 5.50 x10*3/uL    Immature Granulocytes Absolute, Automated 0.07 0.00 - 0.50 x10*3/uL    Lymphocytes Absolute 0.48 (L) 0.80 - 3.00 x10*3/uL    Monocytes Absolute 1.21 (H) 0.05 - 0.80 x10*3/uL    Eosinophils Absolute 0.00 0.00 - 0.40 x10*3/uL    Basophils Absolute 0.02 0.00 - 0.10 x10*3/uL   Comprehensive metabolic panel   Result Value Ref Range    Glucose 115 (H) 74 - 99 mg/dL    Sodium 134 (L) 136 - 145 mmol/L    Potassium 3.7 3.5 - 5.3 mmol/L    Chloride 86 (L) 98 - 107 mmol/L    Bicarbonate 42 (HH) 21 - 32 mmol/L    Anion Gap 10 10 - 20 mmol/L    Urea Nitrogen 20 6 - 23 mg/dL    Creatinine 0.80 0.50 - 1.30 mg/dL    eGFR >90 >60 mL/min/1.73m*2    Calcium 8.9 8.6 - 10.3 mg/dL    Albumin 3.8 3.4 - 5.0 g/dL    Alkaline Phosphatase 136 33 - 136 U/L    Total Protein 7.5 6.4 - 8.2 g/dL    AST 29 9 - 39 U/L    Bilirubin, Total 1.1 0.0 - 1.2 mg/dL    ALT 32 10 - 52 U/L   Magnesium   Result Value Ref Range    Magnesium 1.82 1.60 - 2.40 mg/dL   B-type natriuretic peptide   Result Value Ref Range    BNP 1,175 (H) 0 - 99 pg/mL   D-Dimer, VTE Exclusion   Result Value Ref Range    D-Dimer, Quantitative VTE Exclusion 1,443 (H) <=500 ng/mL FEU   Troponin I, High Sensitivity, Initial   Result Value Ref Range    Troponin I, High Sensitivity 25 (H) 0 - 20 ng/L   ECG 12 lead   Result Value Ref Range    Ventricular Rate 99 BPM    Atrial Rate 96 BPM    QRS Duration 98 ms    QT Interval 332 ms    QTC Calculation(Bazett) 426 ms    R Axis 90 degrees    T Axis -33 degrees    QRS Count 17 beats    Q Onset 209 ms    T Offset 375 ms    QTC Fredericia 392 ms   Urinalysis with Reflex Microscopic   Result Value Ref Range    Color, Urine Light-Yellow Light-Yellow, Yellow, Dark-Yellow    Appearance, Urine Clear Clear    Specific Gravity, Urine 1.014 1.005 - 1.035    pH, Urine 6.0 5.0, 5.5, 6.0, 6.5, 7.0, 7.5, 8.0    Protein, Urine NEGATIVE NEGATIVE,  10 (TRACE), 20 (TRACE) mg/dL    Glucose, Urine OVER (4+) (A) Normal mg/dL    Blood, Urine NEGATIVE NEGATIVE    Ketones, Urine NEGATIVE NEGATIVE mg/dL    Bilirubin, Urine NEGATIVE NEGATIVE    Urobilinogen, Urine 2 (1+) (A) Normal mg/dL    Nitrite, Urine NEGATIVE NEGATIVE    Leukocyte Esterase, Urine 75 Constance/µL (A) NEGATIVE   Microscopic Only, Urine   Result Value Ref Range    WBC, Urine 1-5 1-5, NONE /HPF    RBC, Urine 3-5 NONE, 1-2, 3-5 /HPF    Squamous Epithelial Cells, Urine 1-9 (SPARSE) Reference range not established. /HPF   Troponin, High Sensitivity, 1 Hour   Result Value Ref Range    Troponin I, High Sensitivity 26 (H) 0 - 20 ng/L   Basic Metabolic Panel   Result Value Ref Range    Glucose 132 (H) 74 - 99 mg/dL    Sodium 133 (L) 136 - 145 mmol/L    Potassium 3.8 3.5 - 5.3 mmol/L    Chloride 85 (L) 98 - 107 mmol/L    Bicarbonate 43 (HH) 21 - 32 mmol/L    Anion Gap 9 (L) 10 - 20 mmol/L    Urea Nitrogen 20 6 - 23 mg/dL    Creatinine 0.71 0.50 - 1.30 mg/dL    eGFR >90 >60 mL/min/1.73m*2    Calcium 8.6 8.6 - 10.3 mg/dL          Radiology  XR CHEST 1 VIEW;  8/21/2024 11:22 pm      INDICATION:  Signs/Symptoms:SOB.      COMPARISON:  CXR 08/07/2024      ACCESSION NUMBER(S):  [id number]      ORDERING CLINICIAN:  VINEET JOHNSON      FINDINGS:          CARDIOMEDIASTINAL SILHOUETTE:  Cardiomediastinal silhouette is stable in size and configuration.      LUNGS:  Bilateral small pleural effusions. Bibasilar consolidations, similar  to prior. Background of pulmonary edema.      ABDOMEN:  No remarkable upper abdominal findings.      BONES:  No acute osseous abnormality.      IMPRESSION:  Findings are similar to 08/07/2024. There is background of pulmonary  edema and bilateral small pleural effusions with adjacent lower lobe  consolidations which may reflect compressive atelectasis but  superimposed infection or aspiration is difficult to exclude.           Assessment/Plan   Assessment & Plan  Acute on chronic congestive  heart failure, unspecified heart failure type (Multi)    Acute cystitis without hematuria  Acute on chr. Systolic and diastolic CHF  CAD, s/p PCI and stent  PAF  BPH  Obesity      PLAN: Pt was admitted on CCU SD, c/w gentle diuresis optimize CHF medication, c/w supplemental oxygen titrate to keep Pox, IV CTX for UTI, cardiology consult, DVT prophylaxis, c/w other supportive care, d/w CNP follow closely.             Rishi Anthony MD

## 2024-08-22 NOTE — ED TRIAGE NOTES
Pt became short of breath at nursing facility. EMS found Pt to be at 79% on 4 L NC. Pt was placed on NRB @ 10 L and arrives to ED at 100% SPO2. Pt denies any pain at triage. States he just feels short of breath.

## 2024-08-22 NOTE — ED PROVIDER NOTES
History of Present Illness     History provided by: Patient  Limitations to History: None  External Records Reviewed with Brief Summary: Nursing home paperwork which showed Medication list and medical history    HPI:  Milton Lane is a 74 y.o. male presented emergency room with increasing weakness, fatigue, and shortness of breath.  The patient states that last time he was the year in the hospital, he was discharged to a rehab facility.  However he is not rehabbing well.  When he was at home he was able to walk 10 feet to the bathroom back-and-forth, but that has gotten worse and worse which is why he came to the hospital begin with and was found to be in heart failure with generalized muscular decompensation.  He was discharged from the hospital and placed into the nursing facility for skilled rehab.  He states he has not been doing very well there.  And has been getting worsening shortness of breath.  He states he can barely walk 2 or 3 feet or get up 2 or 3 times a for he is so exhausted he cannot get up anymore to go to the bathroom.  He is on a diuretic that causing the pee a lot, and again he cannot handle walking or getting to the bathroom without help.  He states this evening, the nurse was checking him and his pulse ox was down to the 70s on 4 L nasal cannula which is abnormal for him.  He has been on oxygen for the last 3 to 4 weeks, and been on about 2 to 4 L, but is never been that low while the oxygen was on.  EMS had amount of 100 send nonrebreather and in the upper 90s and was placed on high flow here in the emergency room and is between 93 to 95% on the high flow.  He has no chest pain, no chest pressure but does complain of leg swelling.    The patient denies nausea, no vomiting, no diarrhea, no pain with urination.   The patient states he would not have asked to come to the hospital because he was not feeling anything wrong other than having the low oxygen level.    Physical Exam   Triage  vitals:  T 36.9 °C (98.4 °F)  HR 95  BP    RR (!) 24  O2 100 % Supplemental oxygen    General: Awake, alert, in no acute distress  Eyes: Gaze conjugate.  No scleral icterus or injection  HENT: Normo-cephalic, atraumatic. No stridor  CV: Regular rate, irregular rhythm. Radial pulses 2+ bilaterally.   + systolic murmur present.  (Patient states he has one)   Resp: Crackles bilateral with decreased air movement but patient appears comfortable on the high flow.    GI: Soft, non-distended, non-tender. No rebound or guarding.  MSK/Extremities: +edema bilateral lower extremities, stiff with chronic venous stasis changes.  Swelling up over the knees bilaterally.  Skin: Warm. Appropriate color  Neuro: Alert. Oriented. Face symmetric. Speech is fluent.  Gross strength and sensation intact in b/l UE and LEs  Psych: Appropriate mood and affect    Medical Decision Making & ED Course   Medical Decision Makin y.o. male   ----  Diagnoses as of 24   Acute on chronic congestive heart failure, unspecified heart failure type (Multi)   Hypoxia   Pleural effusion     Disposition   As a result of their workup, the patient will require admission to the hospital.  The patient was informed of his diagnosis.  The patient was given the opportunity to ask questions and I answered them. The patient agreed to be admitted to the hospital.    Procedures   Procedures    Patient seen and discussed with ED attending physician.    I reviewed the case with the attending ED physician. The attending ED physician agrees with the plan. Patient and/or patient´s representative was counseled regarding labs, imaging, likely diagnosis, and plan. All questions were answered. This assessment and plan has been discussed with the attending physician, Dr. Bonilla.    Maggie Ladd, DO  Family Medicine, PGY-2    This note may have been transcribed using Dragon voice recognition system and there is a possibility of unintentional typing  misprints.  Any information found to be copied from previous providers is done in the best interest of the patient to provide accurate, quality, and continuity of care.     The patient was seen by the resident/fellow.  I have personally performed a substantive portion of the encounter.  I have seen and examined the patient; agree with the workup, evaluation, MDM, management and diagnosis.  The care plan has been discussed with the resident; I have reviewed the resident’s note and agree with the documented findings.        Note the patient does not appear to be on any blood thinning medications.  He is on amiodarone for atrial fibrillation, as well as metoprolol.  He is also on midodrine 5 mg 3 times daily to hold for high blood pressure.  And he is also on albuterol 2 puffs every 6 hours.  This is an abbreviated list, full list of Medication list was included with the patient's chart in the ER.  CT scan reveals no sign of pulm pulmonary embolism.  The patient has what appears to be acute heart failure exacerbation with elevated BNP and pulmonary edema noted on scan.  Patient be given Lasix in the emergency room, and admitted to the hospital for further cardiac evaluation and diuresis.                               Kayden Bonilla, DO  08/24/24 1959       Kayden Bonilla, DO  08/25/24 2102

## 2024-08-22 NOTE — PROGRESS NOTES
"Occupational Therapy    Evaluation    Patient Name: Milton Lane  MRN: [id number]  Today's Date: 8/22/2024  Time Calculation  Start Time: 1405  Stop Time: 1418  Time Calculation (min): 13 min  2104/2104-A  Eval only     Assessment  IP OT Assessment  Prognosis: Good  Medical Staff Made Aware: Yes  End of Session Communication: Bedside nurse  End of Session Patient Position: Up in chair, Alarm on  Patient presents with decline in ADLs, functional transfers, and functional mobility tasks and would benefit from OT during acute stay to maximize functional independence and safety.  Patient requires 24 hours hands on assistance.  Recommend moderate intensity OT to maximize functional independence and safety.     Plan:  Treatment Interventions: ADL retraining, Functional transfer training, Patient/family training, Equipment evaluation/education, Compensatory technique education  OT Frequency: 3 times per week  OT Discharge Recommendations: Moderate intensity level of continued care  OT - OK to Discharge: Yes from acute care OT services to the next level of care when cleared by medical team      Subjective     Current Problem:  1. Acute on chronic congestive heart failure, unspecified heart failure type (Multi)        2. Hypoxia        3. Pleural effusion            General:  General  Reason for Referral: ADLs  Referred By: Rishi Anthony MD  Past Medical History Relevant to Rehab: per EMR: 74 y.o. male presenting with SOB and hypoxia fro NH, he was ecwnrly admitted here from home for CHF exacerbation. And was Dc'd to his facility.reportedly he was found to be dysneic with oxygen saturations in the 70's while on 4 l/N He denied chest pain or palpitations. He does c/o having a \"swollen prostate\" and sts his urinary frquency has worsenrd to the point that by the time he is able to stand at his bedside to use a urinal, gets settled back into bed, hehas th urge to go agaiin within minuts, and after four or five he has no " energy, is out of breath and feels like he will slump to the floor. [facility] if he has been seen, or trated by a urologist?His oxygennation improved on a NRB, by ems, he is now on High Flow NC at 10 liters and maintaining sats in the mid nineties. He was given a dose of lasix in ed.  PMH: bipolar, COPD, CAD, PTSD, HTN  Co-Treatment: PT  Co-Treatment Reason: for safety  Prior to Session Communication: Bedside nurse  Patient Position Received: Up in chair, Alarm on  Preferred Learning Style: verbal  General Comment: Patient seated in bedside chair and agreeable to participate in OT evaluation.  There appeared to be liquid on the floor by patients feet.  Patient reports that it is coming from the purewick;however, appears to resemble coffee.    Precautions:  Hearing/Visual Limitations: [place]  Medical Precautions: Fall precautions, Oxygen therapy device and L/min    Vital Signs:  SpO2:  (97% on oxygen via oxymizer)    Pain:  Pain Assessment  Pain Assessment: 0-10  0-10 (Numeric) Pain Score: 0 - No pain    Objective   Cognition:  Overall Cognitive Status: Within Functional Limits       Home Living:  Home Living Comments: Patient has been at [facility].     Prior Function:  Prior Function Comments: Used rollator for functional mobiliy tasks short distances with assistance. Patient limited seocndary to decreased endurance. Had assist with ADLs.    ADL:  UE Dressing Assistance: Maximal (to doff and don [facility] gown secondary to gown being soiled)  LE Dressing Assistance: Total (doff/don  socks)    Activity Tolerance:  Endurance: Decreased tolerance for upright activites    Bed Mobility/Transfers:      Transfers  Transfer:  (CGA sit <> stand with min verbal cues for proper hand placement and technique.  Assisted with changing chair linen as they were soiled.)    Extremities: RUE   RUE : Within Functional Limits and LUE   LUE: Within Functional Limits    Outcome Measures: [facility] Daily  Activity  Putting on and taking off regular lower body clothing: Total  Bathing (including washing, rinsing, drying): A lot  Putting on and taking off regular upper body clothing: A lot  Toileting, which includes using toilet, bedpan or urinal: A lot  Taking care of personal grooming such as brushing teeth: A little  Eating Meals: None  Daily Activity - Total Score: 14    EDUCATION:  Education  Individual(s) Educated: Patient  Education Provided: Fall precautons  Patient Response to Education: Patient/Caregiver Verbalized Understanding of Information    Goals:   Encounter Problems       Encounter Problems (Active)       Dressing Upper Extremities       STG - Patient will dress upper body with min assist  (Progressing)       Start:  08/22/24    Expected End:  09/05/24               Dressings Lower Extremities       STG - Patient will complete lower body dressing with min assist with comp strategies  (Progressing)       Start:  08/22/24    Expected End:  09/05/24               Functional Balance       STG-Patient will be SBA with assistive device dynamic stand task >5 minutes for ADL completion  (Progressing)       Start:  08/22/24    Expected End:  09/05/24               Functional Mobility       STG-Patient will be SBA with assistive device functional mobility tasks   (Progressing)       Start:  08/22/24    Expected End:  09/05/24               OT Transfers       STG-Patient will be SBA with functional transfers demonstrating good safety   (Progressing)       Start:  08/22/24    Expected End:  09/05/24

## 2024-08-22 NOTE — PROGRESS NOTES
Physical Therapy    Physical Therapy Evaluation    Patient Name: Milton Lane  MRN: [id number]  Today's Date: 8/22/2024   Time Calculation  Start Time: 1358  Stop Time: 1417  Time Calculation (min): 19 min  2104/2104-A    Assessment/Plan   PT Assessment  PT Assessment Results: Decreased strength, Decreased endurance, Impaired balance, Decreased mobility  Rehab Prognosis: Fair  Evaluation/Treatment Tolerance: Patient limited by fatigue (limited by short of breath)  Medical Staff Made Aware: Yes  End of Session Communication: Bedside nurse  Assessment Comment: Pt presents today below baseline level of function and requires continued PT during hospital stay. Pt requires PT at a moderate intensity level at discharge to maximize functional mobility and safety.    End of Session Patient Position: Up in chair, Alarm on  IP OR SWING BED PT PLAN  Inpatient or Swing Bed: Inpatient  PT Plan  Treatment/Interventions: Bed mobility, Transfer training, Gait training, Balance training, Neuromuscular re-education, Strengthening, Endurance training, Range of motion, Therapeutic exercise, Therapeutic activity, Home exercise program  PT Plan: Ongoing PT  PT Frequency: 3 times per week  PT Discharge Recommendations: Moderate intensity level of continued care  Equipment Recommended upon Discharge: Wheeled walker  PT Recommended Transfer Status: Assist x2  PT - OK to Discharge: Yes (To next level of care when cleared by medical team   )    Subjective       General Visit Information:  General  Reason for Referral: impaired mobility  Referred By: Rishi Anthony MD  Past Medical History Relevant to Rehab: To hospital with SOB and hypoxia. PMH: bipolar, CHF, COPD, CAD, HTN, anemia, PTSD  Family/Caregiver Present: No  Co-Treatment: OT  Co-Treatment Reason: to maximize pt safety and functional mobility  Prior to Session Communication: Bedside nurse  Patient Position Received: Alarm on, Up in room  Preferred Learning Style: verbal  General  Pt was informed Rx for Adderall was approved to [facility] in [place] on [address] at [address]      Comment: Pt agreeable to PT, nursing cleared for treatment. Upon approach found liquid on floor by patient's feet. Pt states that his purewick was leaking; however, the liquid appeared to be coffee. Dried floor and assisted pt with linen and gown change as they were solied.    Home Living:  Home Living  Home Living Comments: Pt admitted from [facility]    Prior Level of Function:  Prior Function Per Pt/Caregiver Report  Prior Function Comments: Pt reports has uses rollator to ambulate short distances. Pt was working with PT at facility. had assist with ADLs at facility    Precautions:  Precautions  Hearing/Visual Limitations: [facility]  Medical Precautions: Fall precautions, Oxygen therapy device and L/min (oxymizer)    Vital Signs:  Vital Signs  SpO2: 97 % (thorughout session o noxymizer)  Objective     Pain:  Pain Assessment  Pain Assessment: 0-10  0-10 (Numeric) Pain Score: 0 - No pain    Cognition:  Cognition  Overall Cognitive Status: Within Functional Limits  Orientation Level: Oriented X4    General Assessments:      Activity Tolerance  Endurance: Decreased tolerance for upright activites (SOB)  Sensation  Sensation Comment: denies numbness and tingling              Static Sitting Balance  Static Sitting-Comment/Number of Minutes: good  Dynamic Sitting Balance  Dynamic Sitting-Comments: good  Static Standing Balance  Static Standing-Comment/Number of Minutes: good    Functional Assessments:     Bed Mobility  Bed Mobility: No  Transfers  Transfer: Yes  Transfer 1  Transfer From 1: Sit to  Transfer to 1: Stand  Transfer Device 1: Walker  Transfer Level of Assistance 1: Contact guard  Trials/Comments 1: pt only able to tolerate about 4 minutes of static standing due to shortness of breath. Cues given for pursed lipped breathing  Transfers 2  Transfer From 2: Stand to  Transfer to 2: Sit  Transfer Device 2: Walker  Transfer Level of Assistance 2: Contact guard             Extremity/Trunk Assessments:        RLE    RLE : Within Functional Limits  LLE   LLE : Within Functional Limits    Outcome Measures:     [facility] Basic Mobility  Turning from your back to your side while in a flat bed without using bedrails: A little  Moving from lying on your back to sitting on the side of a flat bed without using bedrails: A lot  Moving to and from bed to chair (including a wheelchair): A little  Standing up from a chair using your arms (e.g. wheelchair or bedside chair): A little  To walk in hospital room: A little  Climbing 3-5 steps with railing: Total  Basic Mobility - Total Score: 15                      Goals:  Encounter Problems       Encounter Problems (Active)       PT Problem       Pt will perform bed mobility with min A.        Start:  08/22/24    Expected End:  09/05/24            Pt will complete sit <> stand and bed <> chair transfers with SBA.        Start:  08/22/24    Expected End:  09/05/24            Pt will ambulate 50 feet SBA using FWW with no significant gait deviations.         Start:  08/22/24    Expected End:  09/05/24            Pt will tolerate at least 5 minutes of activity maintaining oxygen saturation >90% and with no report of SOB.         Start:  08/22/24    Expected End:  09/05/24            Pt will progress to completing 3 x 20 supine/seated exercises in order to increase strength and improve gait mechanics.         Start:  08/22/24                     Education Documentation  Precautions, taught by Freda Sanchez PT at 8/22/2024  3:30 PM.  Learner: Patient  Readiness: Acceptance  Method: Explanation  Response: Verbalizes Understanding, Needs Reinforcement    Body Mechanics, taught by Freda Sanchez PT at 8/22/2024  3:30 PM.  Learner: Patient  Readiness: Acceptance  Method: Explanation  Response: Verbalizes Understanding, Needs Reinforcement    Mobility Training, taught by Freda Sanchez PT at 8/22/2024  3:30 PM.  Learner: Patient  Readiness: Acceptance  Method: Explanation  Response:  Verbalizes Understanding, Needs Reinforcement    Education Comments  No comments found.

## 2024-08-23 LAB
ANION GAP SERPL CALC-SCNC: 14 MMOL/L (ref 10–20)
BUN SERPL-MCNC: 23 MG/DL (ref 6–23)
CALCIUM SERPL-MCNC: 8.8 MG/DL (ref 8.6–10.3)
CHLORIDE SERPL-SCNC: 86 MMOL/L (ref 98–107)
CO2 SERPL-SCNC: 37 MMOL/L (ref 21–32)
CREAT SERPL-MCNC: 0.8 MG/DL (ref 0.5–1.3)
EGFRCR SERPLBLD CKD-EPI 2021: >90 ML/MIN/1.73M*2
ERYTHROCYTE [DISTWIDTH] IN BLOOD BY AUTOMATED COUNT: 17.1 % (ref 11.5–14.5)
GLUCOSE SERPL-MCNC: 131 MG/DL (ref 74–99)
HCT VFR BLD AUTO: 34.7 % (ref 41–52)
HGB BLD-MCNC: 10.4 G/DL (ref 13.5–17.5)
MAGNESIUM SERPL-MCNC: 1.88 MG/DL (ref 1.6–2.4)
MCH RBC QN AUTO: 29.8 PG (ref 26–34)
MCHC RBC AUTO-ENTMCNC: 30 G/DL (ref 32–36)
MCV RBC AUTO: 99 FL (ref 80–100)
NRBC BLD-RTO: 0 /100 WBCS (ref 0–0)
PLATELET # BLD AUTO: 208 X10*3/UL (ref 150–450)
POTASSIUM SERPL-SCNC: 3.7 MMOL/L (ref 3.5–5.3)
RBC # BLD AUTO: 3.49 X10*6/UL (ref 4.5–5.9)
SODIUM SERPL-SCNC: 133 MMOL/L (ref 136–145)
WBC # BLD AUTO: 8.9 X10*3/UL (ref 4.4–11.3)

## 2024-08-23 PROCEDURE — 2500000001 HC RX 250 WO HCPCS SELF ADMINISTERED DRUGS (ALT 637 FOR MEDICARE OP): Performed by: INTERNAL MEDICINE

## 2024-08-23 PROCEDURE — 2500000002 HC RX 250 W HCPCS SELF ADMINISTERED DRUGS (ALT 637 FOR MEDICARE OP, ALT 636 FOR OP/ED): Performed by: PHYSICIAN ASSISTANT

## 2024-08-23 PROCEDURE — 99233 SBSQ HOSP IP/OBS HIGH 50: CPT | Performed by: INTERNAL MEDICINE

## 2024-08-23 PROCEDURE — S4991 NICOTINE PATCH NONLEGEND: HCPCS | Performed by: NURSE PRACTITIONER

## 2024-08-23 PROCEDURE — 2060000001 HC INTERMEDIATE ICU ROOM DAILY

## 2024-08-23 PROCEDURE — 2500000004 HC RX 250 GENERAL PHARMACY W/ HCPCS (ALT 636 FOR OP/ED): Performed by: NURSE PRACTITIONER

## 2024-08-23 PROCEDURE — 2500000005 HC RX 250 GENERAL PHARMACY W/O HCPCS: Performed by: INTERNAL MEDICINE

## 2024-08-23 PROCEDURE — 94640 AIRWAY INHALATION TREATMENT: CPT

## 2024-08-23 PROCEDURE — 97112 NEUROMUSCULAR REEDUCATION: CPT | Mod: GP,CQ

## 2024-08-23 PROCEDURE — 2500000002 HC RX 250 W HCPCS SELF ADMINISTERED DRUGS (ALT 637 FOR MEDICARE OP, ALT 636 FOR OP/ED): Performed by: NURSE PRACTITIONER

## 2024-08-23 PROCEDURE — 36415 COLL VENOUS BLD VENIPUNCTURE: CPT | Performed by: NURSE PRACTITIONER

## 2024-08-23 PROCEDURE — 2500000001 HC RX 250 WO HCPCS SELF ADMINISTERED DRUGS (ALT 637 FOR MEDICARE OP): Performed by: NURSE PRACTITIONER

## 2024-08-23 PROCEDURE — 97110 THERAPEUTIC EXERCISES: CPT | Mod: GP,CQ

## 2024-08-23 RX ORDER — POTASSIUM CHLORIDE 20 MEQ/1
20 TABLET, EXTENDED RELEASE ORAL ONCE
Status: COMPLETED | OUTPATIENT
Start: 2024-08-23 | End: 2024-08-23

## 2024-08-23 RX ORDER — TORSEMIDE 20 MG/1
40 TABLET ORAL
Status: DISCONTINUED | OUTPATIENT
Start: 2024-08-23 | End: 2024-08-26

## 2024-08-23 RX ORDER — ALBUTEROL SULFATE 0.83 MG/ML
2.5 SOLUTION RESPIRATORY (INHALATION) EVERY 2 HOUR PRN
Status: DISCONTINUED | OUTPATIENT
Start: 2024-08-23 | End: 2024-08-26 | Stop reason: HOSPADM

## 2024-08-23 ASSESSMENT — COGNITIVE AND FUNCTIONAL STATUS - GENERAL
STANDING UP FROM CHAIR USING ARMS: A LITTLE
WALKING IN HOSPITAL ROOM: A LITTLE
CLIMB 3 TO 5 STEPS WITH RAILING: TOTAL
MOVING FROM LYING ON BACK TO SITTING ON SIDE OF FLAT BED WITH BEDRAILS: A LITTLE
CLIMB 3 TO 5 STEPS WITH RAILING: TOTAL
MOVING FROM LYING ON BACK TO SITTING ON SIDE OF FLAT BED WITH BEDRAILS: A LITTLE
STANDING UP FROM CHAIR USING ARMS: A LITTLE
TOILETING: TOTAL
DRESSING REGULAR UPPER BODY CLOTHING: TOTAL
DRESSING REGULAR LOWER BODY CLOTHING: TOTAL
MOBILITY SCORE: 15
MOVING TO AND FROM BED TO CHAIR: A LITTLE
TURNING FROM BACK TO SIDE WHILE IN FLAT BAD: A LOT
DAILY ACTIVITIY SCORE: 9
MOBILITY SCORE: 15
PERSONAL GROOMING: TOTAL
HELP NEEDED FOR BATHING: TOTAL
MOVING FROM LYING ON BACK TO SITTING ON SIDE OF FLAT BED WITH BEDRAILS: A LITTLE
WALKING IN HOSPITAL ROOM: A LITTLE
WALKING IN HOSPITAL ROOM: A LITTLE
DRESSING REGULAR UPPER BODY CLOTHING: TOTAL
DRESSING REGULAR LOWER BODY CLOTHING: TOTAL
MOVING TO AND FROM BED TO CHAIR: A LITTLE
MOVING TO AND FROM BED TO CHAIR: A LITTLE
TURNING FROM BACK TO SIDE WHILE IN FLAT BAD: A LOT
TURNING FROM BACK TO SIDE WHILE IN FLAT BAD: A LOT
CLIMB 3 TO 5 STEPS WITH RAILING: TOTAL
DAILY ACTIVITIY SCORE: 9
TOILETING: TOTAL
HELP NEEDED FOR BATHING: TOTAL
MOBILITY SCORE: 15
PERSONAL GROOMING: TOTAL
STANDING UP FROM CHAIR USING ARMS: A LITTLE

## 2024-08-23 ASSESSMENT — ACTIVITIES OF DAILY LIVING (ADL): EFFECT OF PAIN ON DAILY ACTIVITIES: .

## 2024-08-23 ASSESSMENT — PAIN SCALES - GENERAL
PAINLEVEL_OUTOF10: 0 - NO PAIN

## 2024-08-23 ASSESSMENT — ENCOUNTER SYMPTOMS
DIZZINESS: 0
SHORTNESS OF BREATH: 1
DECREASED CONCENTRATION: 1
ARTHRALGIAS: 1
COUGH: 1
FREQUENCY: 1
PALPITATIONS: 0
FATIGUE: 1

## 2024-08-23 ASSESSMENT — PAIN - FUNCTIONAL ASSESSMENT
PAIN_FUNCTIONAL_ASSESSMENT: 0-10

## 2024-08-23 NOTE — PROGRESS NOTES
"Physical Therapy    Physical Therapy    Physical Therapy Treatment    Patient Name: Milton Lane  MRN: [id number]  Today's Date: 8/23/2024  Time Calculation  Start Time: 0944  Stop Time: 1010  Time Calculation (min): 26 min     2104/2104-A       08/23/24 0944   PT  Visit   PT Received On 08/23/24   Response to Previous Treatment Patient with no complaints from previous session.   General   Reason for Referral ADLs   Referred By Rishi Anthony MD   Past Medical History Relevant to Rehab per EMR: 74 y.o. male presenting with SOB and hypoxia fro NH, he was ecwnrly admitted here from home for CHF exacerbation. And was Dc'd to his facility.reportedly he was found to be dysneic with oxygen saturations in the 70's while on 4 l/N He denied chest pain or palpitations. He does c/o having a \"swollen prostate\" and sts his urinary frquency has worsenrd to the point that by the time he is able to stand at his bedside to use a urinal, gets settled back into bed, hehas th urge to go agaiin within minuts, and after four or five he has no energy, is out of breath and feels like he will slump to the floor. [facility] if he has been seen, or trated by a urologist?His oxygennation improved on a NRB, by ems, he is now on High Flow NC at 10 liters and maintaining sats in the mid nineties. He was given a dose of lasix in ed.  PMH: bipolar, COPD, CAD, PTSD, HTN   Prior to Session Communication Bedside nurse   Patient Position Received Up in chair;Alarm on   General Comment Pt agreeable to therapy and cleared for participation.   Precautions   Hearing/Visual Limitations [place]   Medical Precautions Fall precautions   Vital Signs   SpO2   (90-93% on 10L oxymizer)   Pain Assessment   Pain Assessment 0-10   0-10 (Numeric) Pain Score 0 - No pain   Effect of Pain on Daily Activities .   Cognition   Overall Cognitive Status WFL   Orientation Level Oriented X4   Therapeutic Exercise   Therapeutic Exercise Performed Yes "   Therapeutic Exercise Activity 1 AP/LAQ/marching- seated/standing 2x10   Balance/Neuromuscular Re-Education   Balance/Neuromuscular Re-Education Activity Performed Yes   Balance/Neuromuscular Re-Education Activity 1 static and dynamic standing activities performed with CGA and chair behind pt for increased safety. single leg stance, marching and heel taps performed x10 using B UE's for support,. Static standing with B UE and unilateral UE support, cga/sba, mildy unsteady with dynamic and unilateral UE support static standing. 5 minutes standing trial x1, 3 minute standing trial x1   Transfers   Transfer Yes   Transfer 1   Transfer From 1 Chair with arms to   Transfer to 1 Stand;Sit   Technique 1 Stand to sit;Sit to stand   Transfer Device 1 Walker;Gait belt   Transfer Level of Assistance 1 Minimum assistance;Minimal verbal cues   Trials/Comments 1 multiple STS with min cue for safe UE placement and sequencing for increased safety, fair follow through. Mary Kay progressing to CGA with continued cueing.   Activity Tolerance   Endurance Tolerates 10 - 20 min exercise with multiple rests   PT Assessment   PT Assessment Results Decreased strength;Decreased endurance;Impaired balance;Decreased mobility   Rehab Prognosis Good   End of Session Communication Bedside nurse   Assessment Comment Pt requires Mary Kay/CGA for safety d/t weakness and fatigue. Increased time and effort to complete activities with seated rest breaks.   End of Session Patient Position Up in chair;Alarm on     Outcome Measures:  [facility] Basic Mobility  Turning from your back to your side while in a flat bed without using bedrails: A little  Moving from lying on your back to sitting on the side of a flat bed without using bedrails: A lot  Moving to and from bed to chair (including a wheelchair): A little  Standing up from a chair using your arms (e.g. wheelchair or bedside chair): A little  To walk in hospital room: A little  Climbing 3-5 steps with railing:  Total  Basic Mobility - Total Score: 15                             EDUCATION:  Outpatient Education  Individual(s) Educated: Patient  Education Provided: Fall Risk  Education Documentation  Precautions, taught by Sabine Degroot PTA at 8/23/2024 10:21 AM.  Learner: Patient  Readiness: Acceptance  Method: Explanation, Demonstration  Response: Verbalizes Understanding, Demonstrated Understanding, Needs Reinforcement    Body Mechanics, taught by Sabine Degroot PTA at 8/23/2024 10:21 AM.  Learner: Patient  Readiness: Acceptance  Method: Explanation, Demonstration  Response: Verbalizes Understanding, Demonstrated Understanding, Needs Reinforcement    Mobility Training, taught by Sabine Degroot PTA at 8/23/2024 10:21 AM.  Learner: Patient  Readiness: Acceptance  Method: Explanation, Demonstration  Response: Verbalizes Understanding, Demonstrated Understanding, Needs Reinforcement    Education Comments  No comments found.        GOALS:  Encounter Problems       Encounter Problems (Active)       PT Problem       Pt will perform bed mobility with min A.  (Progressing)       Start:  08/22/24    Expected End:  09/05/24            Pt will complete sit <> stand and bed <> chair transfers with SBA.  (Progressing)       Start:  08/22/24    Expected End:  09/05/24            Pt will ambulate 50 feet SBA using FWW with no significant gait deviations.   (Progressing)       Start:  08/22/24    Expected End:  09/05/24            Pt will tolerate at least 5 minutes of activity maintaining oxygen saturation >90% and with no report of SOB.   (Progressing)       Start:  08/22/24    Expected End:  09/05/24            Pt will progress to completing 3 x 20 supine/seated exercises in order to increase strength and improve gait mechanics.   (Progressing)       Start:  08/22/24                   Pain - Adult

## 2024-08-23 NOTE — PROGRESS NOTES
Milton Lane is a 74 y.o. male on day 1 of admission presenting with Acute on chronic congestive heart failure, unspecified heart failure type (Multi). Pt remains pretty sick, remains on high flow, oxygen.     Subjective   No cp       Objective         Current Facility-Administered Medications:     acetaminophen (Tylenol) tablet 650 mg, 650 mg, oral, q6h PRN, DEBBIE Samuel    albuterol 2.5 mg /3 mL (0.083 %) nebulizer solution 2.5 mg, 2.5 mg, nebulization, q2h PRN, Rishi Anthony MD    amiodarone (Pacerone) tablet 200 mg, 200 mg, oral, Daily, DEBBIE Samuel, 200 mg at 08/23/24 0901    aspirin EC tablet 81 mg, 81 mg, oral, Daily, DEBBIE Samuel, 81 mg at 08/23/24 0901    atorvastatin (Lipitor) tablet 20 mg, 20 mg, oral, Nightly, DAVID Samuel-CNP, 20 mg at 08/22/24 2208    budesonide (Pulmicort) 0.5 mg/2 mL nebulizer solution 0.5 mg, 0.5 mg, nebulization, BID, DAVID Samuel-CNP, 0.5 mg at 08/23/24 0843    bumetanide (Bumex) tablet 2 mg, 2 mg, oral, BID, DAVID Samuel-CNP, 2 mg at 08/23/24 0901    cefTRIAXone (Rocephin) 1 g in dextrose (iso) IV 50 mL, 1 g, intravenous, q24h, DAVID Samuel-CNP, Stopped at 08/23/24 0930    cholecalciferol (Vitamin D-3) tablet 1,000 Units, 1,000 Units, oral, Daily, DAVID Samuel-CNP, 1,000 Units at 08/23/24 0901    empagliflozin (Jardiance) tablet 12.5 mg, 12.5 mg, oral, Daily, DEBBIE Samuel, 12.5 mg at 08/23/24 0901    fluticasone (Flonase) nasal spray 2 spray, 2 spray, Each Nostril, Daily, DAVID Samuel-CNP, 2 spray at 08/23/24 0900    formoterol (Perforomist) 20 mcg/2 mL nebulizer solution 20 mcg, 20 mcg, nebulization, BID, DEBBIE Samuel, 20 mcg at 08/23/24 0842    heparin (porcine) injection 5,000 Units, 5,000 Units, subcutaneous, q8h, DEBBIE Samuel, 5,000 Units at 08/22/24 2209    isosorbide  mononitrate ER (Imdur) 24 hr tablet 30 mg, 30 mg, oral, Daily, DEBBIE Samuel, 30 mg at 08/23/24 0902    lamoTRIgine (LaMICtal) tablet 100 mg, 100 mg, oral, Daily, DEBBIE Samuel, 100 mg at 08/23/24 0901    lubricating eye drops ophthalmic solution 1 drop, 1 drop, Both Eyes, q4h PRN, DEBBIE Samuel, 1 drop at 08/22/24 2209    magnesium oxide (Mag-Ox) tablet 400 mg, 400 mg, oral, Daily, DEBBIE Samuel, 400 mg at 08/23/24 0902    melatonin tablet 3 mg, 3 mg, oral, Nightly, DEBBIE Samuel    metoprolol succinate XL (Toprol-XL) 24 hr tablet 25 mg, 25 mg, oral, Daily, DEBBIE Samuel, 25 mg at 08/23/24 0901    midodrine (Proamatine) tablet 5 mg, 5 mg, oral, TID, DEBBIE aSmuel, 5 mg at 08/23/24 0902    nicotine (Nicoderm CQ) 7 mg/24 hr patch 1 patch, 1 patch, transdermal, q24h, DEBBIE Samuel, 1 patch at 08/23/24 0900    oxygen (O2) therapy, , inhalation, Continuous PRN - O2/gases, Rishi Anthony MD, 10 L/min at 08/23/24 0844    potassium chloride CR (Klor-Con M20) ER tablet 20 mEq, 20 mEq, oral, Once, Mylene Esparza PA-C    traZODone (Desyrel) tablet 50 mg, 50 mg, oral, Nightly, DEBBIE Samuel, 50 mg at 08/22/24 2208       Physical Exam  HENT:      Head: Normocephalic.   Eyes:      Conjunctiva/sclera: Conjunctivae normal.   Cardiovascular:      Rate and Rhythm: Regular rhythm.   Pulmonary:      Breath sounds: Normal breath sounds.   Abdominal:      General: Bowel sounds are normal.      Palpations: Abdomen is soft.   Musculoskeletal:         General: Normal range of motion.   Skin:     General: Skin is warm and dry.   Neurological:      General: No focal deficit present.      Mental Status: He is alert.   Psychiatric:         Behavior: Behavior normal.           Last Recorded Vitals  Blood pressure 114/55, pulse 86, temperature 36.4 °C (97.5 °F), temperature source  "Temporal, resp. rate (!) 28, height 1.753 m (5' 9\"), weight 98.1 kg (216 lb 4.3 oz), SpO2 (!) 86%.  Intake/Output last 3 Shifts:  I/O last 3 completed shifts:  In: 850 (8.7 mL/kg) [P.O.:800; IV Piggyback:50]  Out: 375 (3.8 mL/kg) [Urine:375 (0.1 mL/kg/hr)]  Weight: 98.1 kg     Labs:       Results for orders placed or performed during the hospital encounter of 08/21/24 (from the past 24 hour(s))   CBC   Result Value Ref Range    WBC 8.9 4.4 - 11.3 x10*3/uL    nRBC 0.0 0.0 - 0.0 /100 WBCs    RBC 3.49 (L) 4.50 - 5.90 x10*6/uL    Hemoglobin 10.4 (L) 13.5 - 17.5 g/dL    Hematocrit 34.7 (L) 41.0 - 52.0 %    MCV 99 80 - 100 fL    MCH 29.8 26.0 - 34.0 pg    MCHC 30.0 (L) 32.0 - 36.0 g/dL    RDW 17.1 (H) 11.5 - 14.5 %    Platelets 208 150 - 450 x10*3/uL   Basic Metabolic Panel   Result Value Ref Range    Glucose 131 (H) 74 - 99 mg/dL    Sodium 133 (L) 136 - 145 mmol/L    Potassium 3.7 3.5 - 5.3 mmol/L    Chloride 86 (L) 98 - 107 mmol/L    Bicarbonate 37 (H) 21 - 32 mmol/L    Anion Gap 14 10 - 20 mmol/L    Urea Nitrogen 23 6 - 23 mg/dL    Creatinine 0.80 0.50 - 1.30 mg/dL    eGFR >90 >60 mL/min/1.73m*2    Calcium 8.8 8.6 - 10.3 mg/dL   Magnesium   Result Value Ref Range    Magnesium 1.88 1.60 - 2.40 mg/dL              Assessment/Plan   Principal Problem:    Acute on chronic congestive heart failure, unspecified heart failure type (Multi)  Active Problems:    Acute cystitis without hematuria  CAD, s/p PCI and stent  PAF  Obesity  Metabolic alkalosis       PLAN: Pt remains pretty sick, remains on high flow oxygen, currently on 10 lt of oxygen, c/w gentle diuresis,  titrate to Fio2 to keep pox > 90%,, med list and labs reviewed for now c/w current Rx, Dvt prophylaxis, d/w CNP, appreciate input of cardiology.               Rishi Anthony MD    "

## 2024-08-23 NOTE — CARE PLAN
The patient's goals for the shift include    Problem: Pain - Adult  Goal: Verbalizes/displays adequate comfort level or baseline comfort level  Outcome: Progressing     Problem: Safety - Adult  Goal: Free from fall injury  Outcome: Progressing     Problem: Discharge Planning  Goal: Discharge to home or other facility with appropriate resources  Outcome: Progressing     Problem: Chronic Conditions and Co-morbidities  Goal: Patient's chronic conditions and co-morbidity symptoms are monitored and maintained or improved  Outcome: Progressing     Problem: Heart Failure  Goal: Improved gas exchange this shift  Outcome: Progressing  Goal: Improved urinary output this shift  Outcome: Progressing  Goal: Reduction in peripheral edema within 24 hours  Outcome: Progressing     Problem: Heart Failure  Goal: Improved gas exchange this shift  Outcome: Progressing  Goal: Improved urinary output this shift  Outcome: Progressing  Goal: Reduction in peripheral edema within 24 hours  Outcome: Progressing     Problem: Skin  Goal: Decreased wound size/increased tissue granulation at next dressing change  Outcome: Progressing  Flowsheets (Taken 8/23/2024 1837)  Decreased wound size/increased tissue granulation at next dressing change: Promote sleep for wound healing  Goal: Participates in plan/prevention/treatment measures  Outcome: Progressing  Flowsheets (Taken 8/23/2024 1837)  Participates in plan/prevention/treatment measures: Discuss with provider PT/OT consult  Goal: Prevent/manage excess moisture  Outcome: Progressing  Flowsheets (Taken 8/23/2024 1837)  Prevent/manage excess moisture: Moisturize dry skin  Goal: Prevent/minimize sheer/friction injuries  Outcome: Progressing  Flowsheets (Taken 8/23/2024 1837)  Prevent/minimize sheer/friction injuries: Use pull sheet  Goal: Promote/optimize nutrition  Outcome: Progressing  Flowsheets (Taken 8/23/2024 1837)  Promote/optimize nutrition: Monitor/record intake including meals  Goal:  Promote skin healing  Outcome: Progressing  Flowsheets (Taken 8/23/2024 6735)  Promote skin healing: Rotate device position/do not position patient on device     Problem: Dressings Lower Extremities  Goal: STG - Patient will complete lower body dressing with min assist with comp strategies   Outcome: Progressing     Problem: Dressing Upper Extremities  Goal: STG - Patient will dress upper body with min assist   Outcome: Progressing     Problem: Respiratory  Goal: Clear secretions with interventions this shift  Outcome: Progressing  Goal: Minimize anxiety/maximize coping throughout shift  Outcome: Progressing  Goal: Minimal/no exertional discomfort or dyspnea this shift  Outcome: Progressing  Goal: No signs of respiratory distress (eg. Use of accessory muscles. Peds grunting)  Outcome: Progressing  Goal: Patent airway maintained this shift  Outcome: Progressing  Goal: Tolerate mechanical ventilation evidenced by VS/agitation level this shift  Outcome: Progressing  Goal: Tolerate pulmonary toileting this shift  Outcome: Progressing  Goal: Verbalize decreased shortness of breath this shift  Outcome: Progressing  Goal: Wean oxygen to maintain O2 saturation per order/standard this shift  Outcome: Progressing  Goal: Increase self care and/or family involvement in next 24 hours  Outcome: Progressing     Problem: Atrial Fibrillation  Goal: I will have no complications due to atrial fibrillation  Outcome: Progressing       The clinical goals for the shift include Pulse ox will remain above 90%

## 2024-08-23 NOTE — PROGRESS NOTES
Cardiology Progress Note           Rounding Cardiologist:  Dr. Denisse Payne  Primary Cardiologist: Dr. Denisse Payne    Date:  8/23/2024  Patient:  Milton Lane  YOB: 1950  MRN:  [id number]   Admit Date:  8/21/2024      SUBJECTIVE    Milton Lane was seen and examined today at bedside.     Patient feels worse today than yesterday. No chest pain or discomfort. No palpitations, syncope or near syncope. No worsening edema. Feels unwell when prolonged standing.     Review of Systems   Constitutional:  Positive for fatigue.   Respiratory:  Positive for cough and shortness of breath.    Cardiovascular:  Positive for leg swelling. Negative for chest pain and palpitations.   Genitourinary:  Positive for frequency.   Musculoskeletal:  Positive for arthralgias.   Neurological:  Negative for dizziness.   Psychiatric/Behavioral:  Positive for decreased concentration.    All other systems reviewed and are negative.       VITALS     Vitals:    08/23/24 0843 08/23/24 0844 08/23/24 0900 08/23/24 0926   BP:   111/56 114/55   BP Location:   Left arm    Patient Position:   Sitting    Pulse:   84 86   Resp:   (!) 41 (!) 28   Temp:   36.4 °C (97.5 °F)    TempSrc:   Temporal    SpO2: 94% 94% 92% (!) 86%   Weight:       Height:           Intake/Output Summary (Last 24 hours) at 8/23/2024 1315  Last data filed at 8/23/2024 0643  Gross per 24 hour   Intake 1040 ml   Output 700 ml   Net 340 ml       Vitals:    08/22/24 0916   Weight: 98.1 kg (216 lb 4.3 oz)       CURRENT MEDICATIONS    amiodarone, 200 mg, oral, Daily  aspirin, 81 mg, oral, Daily  atorvastatin, 20 mg, oral, Nightly  budesonide, 0.5 mg, nebulization, BID  cefTRIAXone, 1 g, intravenous, q24h  cholecalciferol, 1,000 Units, oral, Daily  empagliflozin, 12.5 mg, oral, Daily  fluticasone, 2 spray, Each Nostril, Daily  formoterol, 20 mcg, nebulization, BID  heparin (porcine), 5,000 Units, subcutaneous, q8h  isosorbide mononitrate ER, 30 mg, oral,  Daily  lamoTRIgine, 100 mg, oral, Daily  magnesium oxide, 400 mg, oral, Daily  melatonin, 3 mg, oral, Nightly  metoprolol succinate XL, 25 mg, oral, Daily  midodrine, 5 mg, oral, TID  nicotine, 1 patch, transdermal, q24h  torsemide, 40 mg, oral, BID  traZODone, 50 mg, oral, Nightly         Current Outpatient Medications   Medication Instructions    acetaminophen (TYLENOL) 650 mg, oral, Every 4 hours PRN    albuterol (Ventolin HFA) 90 mcg/actuation inhaler 2 puffs, inhalation, Every 6 hours PRN    albuterol 2.5 mg, nebulization, Every 4 hours PRN    amiodarone (PACERONE) 200 mg, oral, Daily    aspirin 81 mg, oral, Daily    atorvastatin (LIPITOR) 20 mg, oral, Daily    bumetanide (BUMEX) 2 mg, oral, 2 times daily (morning and late afternoon)    cholecalciferol (VITAMIN D-3) 1,000 Units, oral, Daily    empagliflozin (JARDIANCE) 12.5 mg, oral, Daily    fluticasone (Flonase) 50 mcg/actuation nasal spray 2 sprays, Each Nostril, Daily, Shake gently. Before first use, prime pump. After use, clean tip and replace cap.    fluticasone propion-salmeteroL (Advair Diskus) 250-50 mcg/dose diskus inhaler 1 puff, inhalation, 2 times daily RT    isosorbide mononitrate ER (IMDUR) 30 mg, oral, Daily, Do not crush or chew.    lamoTRIgine (LAMICTAL) 100 mg, oral, Daily    lubricating eye drops ophthalmic solution 1 drop, Both Eyes, Every 4 hours PRN    magnesium oxide (MAG-OX) 400 mg, oral, Daily    melatonin 3 mg, oral, Nightly    metoprolol succinate XL (TOPROL-XL) 25 mg, oral, Daily, Do not crush or chew.    midodrine (PROAMATINE) 5 mg, oral, 3 times daily, Hold for sbp>120    nicotine (Nicoderm CQ) 7 mg/24 hr patch 1 patch, transdermal, Every 24 hours    nicotine polacrilex (COMMIT) 2 mg, Mouth/Throat, As needed    oxygen (O2) gas therapy 1 each, inhalation, Continuous    traZODone (DESYREL) 50 mg, oral, Nightly        PHYSICAL EXAMINATION   GENERAL:  Well developed, well nourished, in no acute distress.  CHEST:  Symmetric and  "nontender.  NECK:  Supple, no JVD, no bruit.  LUNGS:  Moderate tachypnea, poor aeration, crackles left base, right clear, prolonged exp phase without wheeze.   HEART:  Irregular with controlled rate, normal S1 and S2, on S3. No change in systolic / aortic stenosis murmur.   ABDOMEN: Soft, NT, ND without palpable organomegaly, normoactive bowel sounds.   EXTREMITIES:  chronic skin changes and firm edema bilateral LE - stockings NOT in place.   NEURO/PSYCH:  Alert and oriented times three with approppriate behavior and responses.    LAB DATA     CBC:   Results from last 7 days   Lab Units 08/23/24  0642   WBC AUTO x10*3/uL 8.9   RBC AUTO x10*6/uL 3.49*   HEMOGLOBIN g/dL 10.4*   HEMATOCRIT % 34.7*   PLATELETS AUTO x10*3/uL 208        CMP:    Results from last 7 days   Lab Units 08/23/24  0643   SODIUM mmol/L 133*   POTASSIUM mmol/L 3.7   CHLORIDE mmol/L 86*   CO2 mmol/L 37*   BUN mg/dL 23   CREATININE mg/dL 0.80   GLUCOSE mg/dL 131*   CALCIUM mg/dL 8.8       Cardiac Enzymes:    Lab Results   Component Value Date    TROPHS 26 (H) 08/22/2024    TROPHS 25 (H) 08/21/2024    TROPHS 132 (HH) 08/07/2024       Magnesium:    Lab Results   Component Value Date    MG 1.88 08/23/2024       Lipid Profile:  No results found for: \"CHLPL\", \"TRIG\", \"HDL\", \"LDLCALC\", \"LDLDIRECT\"    TSH:  No results found for: \"TSH\"    BNP:   Lab Results   Component Value Date    BNP 1,175 (H) 08/21/2024        PT/INR:  No results found for: \"PROTIME\", \"INR\"    HgBA1c:    Lab Results   Component Value Date    HGBA1C 5.5 01/08/2023       CBC:  Lab Results   Component Value Date    WBC 8.9 08/23/2024    WBC 9.6 08/21/2024    RBC 3.49 (L) 08/23/2024    RBC 3.48 (L) 08/21/2024    HGB 10.4 (L) 08/23/2024    HGB 10.3 (L) 08/21/2024    HCT 34.7 (L) 08/23/2024    HCT 33.2 (L) 08/21/2024    MCV 99 08/23/2024    MCV 95 08/21/2024    MCH 29.8 08/23/2024    MCH 29.6 08/21/2024    MCHC 30.0 (L) 08/23/2024    MCHC 31.0 (L) 08/21/2024    RDW 17.1 (H) 08/23/2024    RDW " 17.0 (H) 08/21/2024     08/23/2024     08/21/2024       BMP:  Lab Results   Component Value Date     (L) 08/23/2024     (L) 08/22/2024     (L) 08/21/2024    K 3.7 08/23/2024    K 3.8 08/22/2024    K 3.7 08/21/2024    CL 86 (L) 08/23/2024    CL 85 (L) 08/22/2024    CL 86 (L) 08/21/2024    CO2 37 (H) 08/23/2024    CO2 43 (HH) 08/22/2024    CO2 42 (HH) 08/21/2024    BUN 23 08/23/2024    BUN 20 08/22/2024    BUN 20 08/21/2024    CREATININE 0.80 08/23/2024    CREATININE 0.71 08/22/2024    CREATININE 0.80 08/21/2024       Hepatic Function Panel:    Lab Results   Component Value Date    ALKPHOS 136 08/21/2024    ALT 32 08/21/2024    AST 29 08/21/2024    PROT 7.5 08/21/2024    BILITOT 1.1 08/21/2024         DIAGNOSTIC TESTING RESULTS     ECG 12 lead    Result Date: 8/22/2024  Atrial fibrillation with premature ventricular or aberrantly conducted complexes Rightward axis Possible Inferior infarct , age undetermined Abnormal ECG When compared with ECG of 07-AUG-2024 13:19, Previous ECG has undetermined rhythm, needs review Borderline criteria for Inferior infarct are now Present Inverted T waves have replaced nonspecific T wave abnormality in Inferior leads Nonspecific T wave abnormality now evident in Lateral leads QT has shortened    CT angio chest for pulmonary embolism    Result Date: 8/22/2024  STUDY: CT Angiogram of the Chest; 08/22/2024, 1:39 AM INDICATION: Shortness of breath, hypoxia, evaluate for a PE. COMPARISON: CTA chest 01/08/2023. ACCESSION NUMBER(S): [id number] ORDERING CLINICIAN: VINEET JOHNSON TECHNIQUE:  CTA of the chest was performed with intravenous contrast. Images are reviewed and processed at a workstation according to the CT angiogram protocol with 3-D and/or MIP post processing imaging generated.  Omnipaque 350, 75 mL was administered intravenously. Automated mA/kV exposure control was utilized and patient examination was performed in strict accordance with  principles of ALARA. FINDINGS: Pulmonary arteries are adequately opacified without acute or chronic filling defects.  The thoracic aorta is normal in course and caliber without dissection or aneurysm. There is moderate cardiomegaly.  There is borderline enlargement of the mediastinal lymph nodes most likely due to congestion. There is no pleural effusion, pleural thickening, or pneumothorax. The airways are patent. Small left and moderate right pleural effusion. There is compressive atelectasis in the right middle lobe and right lower lobe.  There is diffuse interstitial thickening both lungs suggesting pulmonary edema. Upper abdomen demonstrates no acute pathology. There are no acute fractures.  No suspicious bony lesions.    1.No evidence of pulmonary embolism. 2.Findings most consistent with congestive heart failure. Signed by Tyrone Ruiz MD    XR chest 1 view    Result Date: 8/22/2024  Interpreted By:  Bryon Ruff, STUDY: XR CHEST 1 VIEW;  8/21/2024 11:22 pm   INDICATION: Signs/Symptoms:SOB.   COMPARISON: CXR 08/07/2024   ACCESSION NUMBER(S): [id number]   ORDERING CLINICIAN: VINEET JOHNSON   FINDINGS:     CARDIOMEDIASTINAL SILHOUETTE: Cardiomediastinal silhouette is stable in size and configuration.   LUNGS: Bilateral small pleural effusions. Bibasilar consolidations, similar to prior. Background of pulmonary edema.   ABDOMEN: No remarkable upper abdominal findings.   BONES: No acute osseous abnormality.       Findings are similar to 08/07/2024. There is background of pulmonary edema and bilateral small pleural effusions with adjacent lower lobe consolidations which may reflect compressive atelectasis but superimposed infection or aspiration is difficult to exclude.   MACRO: None   Signed by: Bryon Ruff 8/22/2024 12:18 AM Dictation workstation:   [id number]         RADIOLOGY     CT angio chest for pulmonary embolism   Final Result   1.No evidence of pulmonary embolism.   2.Findings most  consistent with congestive heart failure.   Signed by Tyrone Ruiz MD      XR chest 1 view   Final Result   Findings are similar to 08/07/2024. There is background of pulmonary   edema and bilateral small pleural effusions with adjacent lower lobe   consolidations which may reflect compressive atelectasis but   superimposed infection or aspiration is difficult to exclude.        MACRO:   None        Signed by: Bryon Ruff 8/22/2024 12:18 AM   Dictation workstation:   [id number]            ASSESSMENT     Problem List Items Addressed This Visit       * (Principal) Acute on chronic congestive heart failure, unspecified heart failure type (Multi) - Primary    Relevant Medications    midodrine (Proamatine) 5 mg tablet    isosorbide mononitrate ER (Imdur) 24 hr tablet 30 mg    midodrine (Proamatine) tablet 5 mg    metoprolol succinate XL (Toprol-XL) 24 hr tablet 25 mg     Other Visit Diagnoses       Hypoxia        Pleural effusion                Patient Active Problem List   Diagnosis    Coronary artery disease involving native coronary artery of native heart without angina pectoris    Chronic systolic heart failure (Multi)    Nicotine dependence, uncomplicated    Nonrheumatic aortic valve stenosis    Shortness of breath    Acute on chronic combined systolic (congestive) and diastolic (congestive) heart failure (Multi)    Hyponatremia    Hypervolemia    Acute on chronic hypoxic respiratory failure (Multi)    COPD (chronic obstructive pulmonary disease) (Multi)    Abnormal urinalysis    Acute on chronic congestive heart failure, unspecified heart failure type (Multi)    Acute cystitis without hematuria       PLAN     Change to torsemide for better diuresis. Please place compressive stockins / SCD to help mobilize leg edema.   Stop midodrine  Check daily orthostatic vital signs  Stage D heart failure - consult palliative care  Conservative med Rx for CAD  Continue rate control of a fib  Aortic stenosis not severe -  monitor.     Long term poor prognosis with combination of heart failure and COPD. This time he was DC to SNF and still had early return. Not a good sign.     Please do not hesitate to call with questions.  Electronically signed by Denisse Payne MD, on 8/23/2024 at 1:15 PM

## 2024-08-23 NOTE — NURSING NOTE
THE PT WAS SITTING IN THE RECLINER CHAIR. APPEARS WITH LABORED BREATHING WITH ANY EXERTION OF SPEECH OR CAILIN WITH MOVEMENT. WAS INCONTINENT OF A LG AMT IF URINE ALL OVER THE FLOOR. AT FIRST THE PT REFUSED TO BE CLEANED UP AND BATHED BUT THEN GAVE IN.. HE STOOD WELL WITH THE WALKER AND IF HIS ACTIONS WERE SLOW HIS O2 SATS REMAINED ABOVE 90%, HIS SKIN WAS LOTIONED, ZINC OXIDE CREAMS APPLIED. HIS BUTTOCKS ARE PURPLE WITH A COCCYX WOUND AND BILAT INNER THIGHS ARE REDDENED AND EXCORIATED WITH OPEN WOUNDS ULCERATED AREAS WHICH ARE TENDER TO TOUCH. HE HAS SIGNIFICANT  EDEMA VERYWHERE BUT CAILIN IN HIS LOWER HALF OF HIS BODY. HIS LEGS/FEET ARE 3+; MITZI IN APPEARANCE. TENDER TO TOUCH. PULSES ARE PALPABLE WEAKLY.  HE VOICES NEUROPATHY IN BILAT LEGS HOWEVER WALKS PRETTY WELL X1 ASSIST WITH THE WALKER IF HIS BREATHING TOLERATES IT. HE DENIES ANY CHEST PAIN OR DIZZINESS. HIS EKG RHYTHM IS AFIB WITH A CONTROLLED RATE. THJE PTS BS ARE DIMINISHED THROUGHOUT WITH SCATTERED RALES AND CRACKLES. OCC HE HAS A PROD COUGH WITH A SM AMT OF YELLOW SPUTUM. THE PT CHANG X4. DENIES THE NEED FOR ANY PAIN MEDS FOR HIS BACK. IS ORIENTED X4. LIKES THINGS DONE HIS WAY OR NOT AT ALL. FREQ ASKING FOR FOOD AND DRINK. VERY [place]. THE CHAIR ALARM REMAINS ON. THE PT WAS ENC. NOT TO GET UP W/O HELP SINCE HE'S SO SOB. PRESENTLY HE IS ON 10 L OXIMIZER.   0030-THE PT WAS FINE IN HIS RECLINER CHAIR AND DECIDED HE WOULD TRY GETTING BACK IN BED. X1 PERSON TRANSFER WITH THE WALKER HOWEVER ONCE IN THE BED HIS O2 SATS DROPPED INTO THE 70'S/ BECAME VERY LABORED IN HIS BREATHING; TACHYPNEA AND ANXIOUS. HE PUT HIS OXIMYZER 10 L INTO HIS MOUTH AND WITH 5-7 MIN HIS PULSE OX WENT UP TO 93-94% AND HIS BREATHING NORMALIZED ALONG WITH HIS ANXIETY LEVEL. THE PT LASTED IN BED FOR APROX 15 MIN THEN CALLED OUT HELP ME HELP ME I CAN'T BREATHE. I HAVE TO GET UP.  STATED HE WOULD GET OOB WITH OR WITHOUT HELP BUT THAT HE WAS GETTING UP AND NOW. LEGS OUT THE BOTTOM OF THE BED. BED ALARMS  GOING OFF. SO HE WAS POSITIONED BACK IN THE CHAIR WHERE HIS O2 SATS RE COOPED MORE QUICKLY AFTER THE EXERTION OF GETTING UP OOB. . THE PT STATED I JUST HAD TO TRY THAT AGAIN. I DIDN'T THINK IT WOULD WORK. HE WAS THEN CONTENT TO STAY IN THE RECLINER CHAIR .  0500- THE PT SLEPT THE PAST SEVERAL HRS IN THE RECLINER CHAIR. RESPS ARE UNLABORED AND O2 SATS REMAIN ABOVE 93%. ONCE AWAKE HE AGAIN STARTED ASKING FOR FOOD AND DRINK. DENIED ANY CHEST PAIN AND STATED HE WAS COMFORTABLE RIGHT WHERE HE WAS. APPEARED IN GOOD SPIRITS AT THIS TIME. VERY COOPERATIVE. HIS PURWICK EXT CATHETER  HAD SOME GOOD U/O NOTED. REFER TO I/O. VSS. TEMP AFEBRILE.  RESPS EVEN AND UNLABORED. C/O AN OCC MOIST NAGGING COUGH.   0645-  NO FURTHER DISTRESS OBSERVED FOR THE REMAINDER OF THE SHIFT. AM LABS WERE DRAWN LATE THIS AM.

## 2024-08-23 NOTE — PROGRESS NOTES
Attending has requested that patient be referred to LTAC on discharge from hospital. This TCC stated to NP that not sure whether patient would qualify for LTAC at this time. Patient is a re admit within 30 days so will request DSC to place referrals to [facility] for acceptance. CT team will follow patient.

## 2024-08-23 NOTE — DOCUMENTATION CLARIFICATION NOTE
PATIENT:               JACINTA GIMENEZ  ACCT #:                  [id number]  MRN:                       [id number]  :                       1950  ADMIT DATE:       2024 10:34 PM  DISCH DATE:  RESPONDING PROVIDER #:        [id number]          PROVIDER RESPONSE TEXT:    Acute on chronic Hypoxemic Respiratory Failure    CDI QUERY TEXT:    Clarification        Instruction:    Based on your assessment of the patient and the clinical information, please provide the requested documentation by clicking on the appropriate radio button and enter any additional information if prompted.    Question: Is there a diagnosis indicative of the clinical information    When answering this query, please exercise your independent professional judgment. The fact that a question is being asked, does not imply that any particular answer is desired or expected.    The patient's clinical indicators include:  Clinical Information: 73 yo male brought in due to SOB. DX with acute on chronic combined CHF    Clinical Indicators:   Vital signs: T 36.9 HR95 R24 /68 96 percent 10L high flow  PO2/FI02 ratio 140.63  Home oxygen use on 4L   CT chest : Impression:  1.No evidence of pulmonary embolism.  2.Findings most consistent with congestive heart failure. ,   ED note: Resp: Crackles bilateral with decreased air movement but patient appears comfortable on the high flow.  ,    Treatment:  Supplemental oxygen 10L NRB, Highflow mask and oximizer    Risk Factors: COPD, HTN, CHF, Atrial fib, Obesity, smoker  Options provided:  -- Acute on chronic Hypoxemic Respiratory Failure  -- No acute respiratory failure  -- Other - I will add my own diagnosis  -- Refer to Clinical Documentation Reviewer    Query created by: Miya Machado on 2024 10:01 AM      Electronically signed by:  CHRISTIANO GUERRERO MD 2024 10:56 AM

## 2024-08-23 NOTE — CONSULTS
"Reason For Consult  Stage D Diastolic heart failure. Severe COPD    History Of Present Illness  Milton Lane is a 74 y.o. male presenting with shortness of breath at nursing facility, 02 saturation 79% on 4 L NC. Placed on NRB @ 10L by % SPO2.  He was recently hospitalized from home for CHF exacerbation.  He is currently on HFNC 10L with saturation in the 90's     Past Medical History  He has a past medical history of Bipolar 1 disorder (Multi), Chronic systolic heart failure (Multi) (12/15/2023), COPD (chronic obstructive pulmonary disease) (Multi), Coronary artery disease involving native coronary artery of native heart without angina pectoris (12/15/2023), Hypertension, Iron deficiency anemia, Nicotine dependence, uncomplicated (12/15/2023), Nonrheumatic aortic valve stenosis (12/15/2023), Obese, PAF (paroxysmal atrial fibrillation) (Multi), and PTSD (post-traumatic stress disorder).    Surgical History  He has no past surgical history on file.     Social History  He reports that he has been smoking cigarettes. He has never used smokeless tobacco. He reports current alcohol use. He reports that he does not use drugs.    Family History  No family history on file.     Allergies  Flu vaccine 2011-12(3 yr+)(pf) and Losartan    Review of Systems       Physical Exam       Last Recorded Vitals  Blood pressure 114/55, pulse 86, temperature 36.4 °C (97.5 °F), temperature source Temporal, resp. rate (!) 28, height 1.753 m (5' 9\"), weight 98.1 kg (216 lb 4.3 oz), SpO2 (!) 86%.    Relevant Results       Assessment/Plan   Palliative care:  Met with pt in his room while he was sitting up in recliner. He is very SOB but alert and oriented.  He is from [facility]  rehab after  CHF exacerbation and recent hospitalization.  He lives alone in an efficiency apartment in [place] and has no family. He has meals on wheels but finds it increasingly difficult to heat up his meals due to difficulty standing and " ambulating. He has a previous neighbor, Loan Cuenca who is his power of .  Loan is currently in [place] visiting her elderly father. He asked that I give her name and phone number to the Navigator referral as he does not use the telephone.   We discussed Navigator Palliative Care services and he is in agreement that this may be helpful with symptom management. His current symptoms are SOB, urinary retention, CHF.   A referral was placed in care [facility] Navigator program.   Gabrielle Ron CNP  VBecca Freeman RN notified             Yanira Castellanos RN

## 2024-08-23 NOTE — CARE PLAN
The patient's goals for the shift include  GET SOME SLEEP    The clinical goals for the shift include THE PT WILL  BE HDS, NOT GET SOB AND DESAT WITH EXERTION, WILL TOLERATE O2 WEENING, WILL HAVE A U/O > 30,ML /HR; WILL HAVE ELECTROLYTES WNL; MAINTAIN A HR < 100 BTS/MIN;   CONVERT INTO A NSR BY THE END OF THIS SHIFT

## 2024-08-24 PROBLEM — I48.11 LONGSTANDING PERSISTENT ATRIAL FIBRILLATION (MULTI): Chronic | Status: ACTIVE | Noted: 2024-01-01

## 2024-08-24 LAB
ANION GAP SERPL CALC-SCNC: 12 MMOL/L (ref 10–20)
BUN SERPL-MCNC: 27 MG/DL (ref 6–23)
CALCIUM SERPL-MCNC: 8.8 MG/DL (ref 8.6–10.3)
CHLORIDE SERPL-SCNC: 85 MMOL/L (ref 98–107)
CO2 SERPL-SCNC: 39 MMOL/L (ref 21–32)
CREAT SERPL-MCNC: 0.87 MG/DL (ref 0.5–1.3)
EGFRCR SERPLBLD CKD-EPI 2021: 89 ML/MIN/1.73M*2
ERYTHROCYTE [DISTWIDTH] IN BLOOD BY AUTOMATED COUNT: 16.8 % (ref 11.5–14.5)
GLUCOSE SERPL-MCNC: 100 MG/DL (ref 74–99)
HCT VFR BLD AUTO: 32.3 % (ref 41–52)
HGB BLD-MCNC: 9.8 G/DL (ref 13.5–17.5)
MAGNESIUM SERPL-MCNC: 1.8 MG/DL (ref 1.6–2.4)
MCH RBC QN AUTO: 30 PG (ref 26–34)
MCHC RBC AUTO-ENTMCNC: 30.3 G/DL (ref 32–36)
MCV RBC AUTO: 99 FL (ref 80–100)
NRBC BLD-RTO: 0 /100 WBCS (ref 0–0)
PLATELET # BLD AUTO: 228 X10*3/UL (ref 150–450)
POTASSIUM SERPL-SCNC: 4.3 MMOL/L (ref 3.5–5.3)
RBC # BLD AUTO: 3.27 X10*6/UL (ref 4.5–5.9)
SODIUM SERPL-SCNC: 132 MMOL/L (ref 136–145)
WBC # BLD AUTO: 8.6 X10*3/UL (ref 4.4–11.3)

## 2024-08-24 PROCEDURE — 2500000005 HC RX 250 GENERAL PHARMACY W/O HCPCS: Performed by: INTERNAL MEDICINE

## 2024-08-24 PROCEDURE — 2060000001 HC INTERMEDIATE ICU ROOM DAILY

## 2024-08-24 PROCEDURE — 2500000004 HC RX 250 GENERAL PHARMACY W/ HCPCS (ALT 636 FOR OP/ED): Performed by: NURSE PRACTITIONER

## 2024-08-24 PROCEDURE — 2500000001 HC RX 250 WO HCPCS SELF ADMINISTERED DRUGS (ALT 637 FOR MEDICARE OP): Performed by: NURSE PRACTITIONER

## 2024-08-24 PROCEDURE — 99232 SBSQ HOSP IP/OBS MODERATE 35: CPT | Performed by: INTERNAL MEDICINE

## 2024-08-24 PROCEDURE — 99223 1ST HOSP IP/OBS HIGH 75: CPT | Performed by: INTERNAL MEDICINE

## 2024-08-24 PROCEDURE — 2500000002 HC RX 250 W HCPCS SELF ADMINISTERED DRUGS (ALT 637 FOR MEDICARE OP, ALT 636 FOR OP/ED): Performed by: INTERNAL MEDICINE

## 2024-08-24 PROCEDURE — 2500000001 HC RX 250 WO HCPCS SELF ADMINISTERED DRUGS (ALT 637 FOR MEDICARE OP): Performed by: INTERNAL MEDICINE

## 2024-08-24 PROCEDURE — 94640 AIRWAY INHALATION TREATMENT: CPT

## 2024-08-24 PROCEDURE — 2500000005 HC RX 250 GENERAL PHARMACY W/O HCPCS: Performed by: NURSE PRACTITIONER

## 2024-08-24 PROCEDURE — 2500000002 HC RX 250 W HCPCS SELF ADMINISTERED DRUGS (ALT 637 FOR MEDICARE OP, ALT 636 FOR OP/ED): Performed by: NURSE PRACTITIONER

## 2024-08-24 PROCEDURE — S4991 NICOTINE PATCH NONLEGEND: HCPCS | Performed by: NURSE PRACTITIONER

## 2024-08-24 PROCEDURE — 36415 COLL VENOUS BLD VENIPUNCTURE: CPT | Performed by: NURSE PRACTITIONER

## 2024-08-24 ASSESSMENT — PAIN DESCRIPTION - DESCRIPTORS: DESCRIPTORS: ACHING

## 2024-08-24 ASSESSMENT — PAIN - FUNCTIONAL ASSESSMENT
PAIN_FUNCTIONAL_ASSESSMENT: 0-10

## 2024-08-24 ASSESSMENT — ENCOUNTER SYMPTOMS
ABDOMINAL DISTENTION: 1
COUGH: 1
SHORTNESS OF BREATH: 1
PSYCHIATRIC NEGATIVE: 1
CARDIOVASCULAR NEGATIVE: 1
FATIGUE: 1
WEAKNESS: 1
MUSCULOSKELETAL NEGATIVE: 1

## 2024-08-24 ASSESSMENT — COGNITIVE AND FUNCTIONAL STATUS - GENERAL
MOVING FROM LYING ON BACK TO SITTING ON SIDE OF FLAT BED WITH BEDRAILS: A LITTLE
MOVING TO AND FROM BED TO CHAIR: A LITTLE
CLIMB 3 TO 5 STEPS WITH RAILING: A LOT
TURNING FROM BACK TO SIDE WHILE IN FLAT BAD: A LOT
STANDING UP FROM CHAIR USING ARMS: A LITTLE
MOBILITY SCORE: 16
WALKING IN HOSPITAL ROOM: A LITTLE

## 2024-08-24 ASSESSMENT — PAIN SCALES - GENERAL
PAINLEVEL_OUTOF10: 3
PAINLEVEL_OUTOF10: 0 - NO PAIN

## 2024-08-24 ASSESSMENT — PAIN DESCRIPTION - LOCATION: LOCATION: HEAD

## 2024-08-24 ASSESSMENT — PAIN DESCRIPTION - ORIENTATION: ORIENTATION: ANTERIOR

## 2024-08-24 NOTE — CARE PLAN
The patient's goals for the shift include      The clinical goals for the shift include Pt will have no worsening in SOB / oxyegn needs thru end of shift 8/24/24    Pt met goals

## 2024-08-24 NOTE — PROGRESS NOTES
Milton Lane is a 74 y.o. male on day 2 of admission presenting with Acute on chronic combined systolic and diastolic CHF, NYHA class 4 (Multi).  Patient remains on high flow oxygen, denied any chest pain    Subjective   Still c/o SOB       Objective         Current Facility-Administered Medications:     acetaminophen (Tylenol) tablet 650 mg, 650 mg, oral, q6h PRN, DEBBIE Samuel    albuterol 2.5 mg /3 mL (0.083 %) nebulizer solution 2.5 mg, 2.5 mg, nebulization, q2h PRN, Rishi Anthony MD    amiodarone (Pacerone) tablet 200 mg, 200 mg, oral, Daily, DEBBIE Samuel, 200 mg at 08/24/24 0838    aspirin EC tablet 81 mg, 81 mg, oral, Daily, DEBBIE Samuel, 81 mg at 08/24/24 0838    atorvastatin (Lipitor) tablet 20 mg, 20 mg, oral, Nightly, DEBBIE Samuel, 20 mg at 08/23/24 2133    budesonide (Pulmicort) 0.5 mg/2 mL nebulizer solution 0.5 mg, 0.5 mg, nebulization, BID, DEBBIE Samuel, 0.5 mg at 08/24/24 0819    cefTRIAXone (Rocephin) 1 g in dextrose (iso) IV 50 mL, 1 g, intravenous, q24h, DEBBIE Samuel, Stopped at 08/24/24 0951    cholecalciferol (Vitamin D-3) tablet 1,000 Units, 1,000 Units, oral, Daily, DEBBIE Samuel, 1,000 Units at 08/24/24 0838    empagliflozin (Jardiance) tablet 12.5 mg, 12.5 mg, oral, Daily, DEBBIE Samuel, 12.5 mg at 08/24/24 0838    fluticasone (Flonase) nasal spray 2 spray, 2 spray, Each Nostril, Daily, DEBBIE Samuel, 2 spray at 08/24/24 0838    formoterol (Perforomist) 20 mcg/2 mL nebulizer solution 20 mcg, 20 mcg, nebulization, BID, DEBBIE Samuel, 20 mcg at 08/24/24 0818    heparin (porcine) injection 5,000 Units, 5,000 Units, subcutaneous, q8h, DEBBIE Samuel, 5,000 Units at 08/24/24 1400    isosorbide mononitrate ER (Imdur) 24 hr tablet 30 mg, 30 mg, oral, Daily, DEBBIE Samuel, 30 mg at  "08/24/24 0838    lamoTRIgine (LaMICtal) tablet 100 mg, 100 mg, oral, Daily, DEBBIE Samuel, 100 mg at 08/24/24 0838    lubricating eye drops ophthalmic solution 1 drop, 1 drop, Both Eyes, q4h PRN, DEBBIE Samuel, 1 drop at 08/22/24 2209    magnesium oxide (Mag-Ox) tablet 400 mg, 400 mg, oral, Daily, DEBBIE Samuel, 400 mg at 08/24/24 0838    melatonin tablet 3 mg, 3 mg, oral, Nightly, DEBBIE Samuel, 3 mg at 08/23/24 2133    metoprolol succinate XL (Toprol-XL) 24 hr tablet 25 mg, 25 mg, oral, Daily, DEBBIE Samuel, 25 mg at 08/24/24 0838    nicotine (Nicoderm CQ) 7 mg/24 hr patch 1 patch, 1 patch, transdermal, q24h, DEBBIE Samuel, 1 patch at 08/24/24 0921    oxygen (O2) therapy, , inhalation, Continuous PRN - O2/gases, Rishi Anthony MD, 10 L/min at 08/24/24 0820    torsemide (Demadex) tablet 40 mg, 40 mg, oral, BID, Denisse Payne MD, 40 mg at 08/24/24 0838    traZODone (Desyrel) tablet 50 mg, 50 mg, oral, Nightly, DEBBIE Samuel, 50 mg at 08/23/24 2133       Physical Exam  HENT:      Head: Normocephalic.   Eyes:      Conjunctiva/sclera: Conjunctivae normal.   Cardiovascular:      Rate and Rhythm: Regular rhythm.   Pulmonary:      Breath sounds: Normal breath sounds.   Abdominal:      General: Bowel sounds are normal.      Palpations: Abdomen is soft.   Musculoskeletal:         General: Normal range of motion.   Skin:     General: Skin is warm and dry.   Neurological:      General: No focal deficit present.      Mental Status: He is alert.   Psychiatric:         Behavior: Behavior normal.           Last Recorded Vitals  Blood pressure 108/57, pulse 82, temperature 36.5 °C (97.7 °F), temperature source Temporal, resp. rate 26, height 1.753 m (5' 9\"), weight 98.1 kg (216 lb 4.3 oz), SpO2 92%.  Intake/Output last 3 Shifts:  I/O last 3 completed shifts:  In: 1040 (10.6 mL/kg) [P.O.:1040]  Out: 2500 " (25.5 mL/kg) [Urine:2500 (0.7 mL/kg/hr)]  Weight: 98.1 kg     Labs:       Results for orders placed or performed during the hospital encounter of 08/21/24 (from the past 24 hour(s))   CBC   Result Value Ref Range    WBC 8.6 4.4 - 11.3 x10*3/uL    nRBC 0.0 0.0 - 0.0 /100 WBCs    RBC 3.27 (L) 4.50 - 5.90 x10*6/uL    Hemoglobin 9.8 (L) 13.5 - 17.5 g/dL    Hematocrit 32.3 (L) 41.0 - 52.0 %    MCV 99 80 - 100 fL    MCH 30.0 26.0 - 34.0 pg    MCHC 30.3 (L) 32.0 - 36.0 g/dL    RDW 16.8 (H) 11.5 - 14.5 %    Platelets 228 150 - 450 x10*3/uL   Basic Metabolic Panel   Result Value Ref Range    Glucose 100 (H) 74 - 99 mg/dL    Sodium 132 (L) 136 - 145 mmol/L    Potassium 4.3 3.5 - 5.3 mmol/L    Chloride 85 (L) 98 - 107 mmol/L    Bicarbonate 39 (H) 21 - 32 mmol/L    Anion Gap 12 10 - 20 mmol/L    Urea Nitrogen 27 (H) 6 - 23 mg/dL    Creatinine 0.87 0.50 - 1.30 mg/dL    eGFR 89 >60 mL/min/1.73m*2    Calcium 8.8 8.6 - 10.3 mg/dL   Magnesium   Result Value Ref Range    Magnesium 1.80 1.60 - 2.40 mg/dL              Assessment/Plan   Principal Problem:    Acute on chronic combined systolic and diastolic CHF, NYHA class 4 (Multi)  Active Problems:    Atherosclerosis of native coronary artery of native heart without angina pectoris    Nonrheumatic aortic valve stenosis    Shortness of breath    Acute on chronic hypoxic respiratory failure (Multi)    Acute cystitis without hematuria    Longstanding persistent atrial fibrillation (Multi)      PLAN: Patient remains on high flow, titrate FiO2 keep pulse ox above 90%, continue with gentle diuresis, monitor BUN/creatinine and electrolytes, optimize CHF treatment, med list and labs reviewed, for now c/w current Rx, follow closely.              Rishi Anthony MD

## 2024-08-24 NOTE — CONSULTS
"Heart Failure Nurse Navigator    The role of the HF nurse navigator is to (1) characterize risk profiles of patients with heart failure transitioning from [id number] after hospitalization, (2) recommend interventions to improve care and reduce risks of worse post-hospitalization outcomes. Potential recommendations may touch base on patient/family education, additional consults that may bring value, and appointment scheduling.    Assessment    I met with Milton Lane at the bedside, and my impressions include:  Patient very well-known to me from admissions for acute on chronic diastolic HF and acute on chronic respiratory failure. Prior notes from me highlight his difficult situation. 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 [facility] and is on a medication regiment that provides mental stabilization. Due to former manic episodes, he states he \"lost everything\" and has been residing in the [facility] in [place]. He has severe mobility issues, relying on a walker and motorized scooter which he admits is getting increasingly difficult. He has increased resp effort each admit and is a current smoker. He also drinks multiple beers daily. He relies on a former neighbor for assistance and she is his POA. Two admits ago we arranged low sodium Meals on Wheels for him and attempted to get the hotel to get him a working phone. He became more compliant with medications but never elevates BLE and is unable to apply his own compression hose. He continues to beg his friend to bring him salty snacks, which she compromises by buying the lower sodium versions. Due to several failures with discharge to his hotel, he was discharged to [facility] after his last admit just last week, as they accepted his [facility] benefits. He continues to fail and returned with pulse ox 79% on 4L/NC with BLE edema, " wheezing, sob, and elevated BNP. He was hyponatremic his last admit which complicated his GDMT and he was sent out with Bumetanide. During the last admit, he specifically asked me during our session what stage his heart and lung disease was. This was communicated to the health care team at that time.    Please see the medical record for lab and xray results.    Patients Cardiologist(s): Dr. Jo  (Second admit)    Patients Primary Care Provider: Dr. Anthony for inpatient, [facility]    1. Medical Domain  What is the patient's most recent LVEF? Combined systolic/diastolic HFimpEF/HFpEF with EF 55-8% (prior 34-40%) with impaired relaxation of LF diastolic filling:  Echo 6/16/24:  CONCLUSIONS:   1. Left ventricular ejection fraction is normal, by visual estimate at 58%.   2. Spectral Doppler shows an impaired relaxation pattern of left ventricular diastolic filling.   3. Left ventricular cavity size is mildly dilated.   4. There is normal right ventricular global systolic function.   5. Mild aortic valve stenosis.   6. Mild to moderate aortic valve regurgitation.   7. The left ventricle was not well visualized. The left ventricular ejection fraction could not be measured.  HFrEF (LVEF <= 40%) Quadruple therapy recommended  HFmrEF (LVEF 41-49%) Quadruple therapy recommended  HFpEF (LVEF >= 50%) Minimum recommendations: SGLT2i and MRA  Is the patient on GDMT for their condition? Yes    Could this patient have advanced heart failure (Stage D heart failure)?: Yes   If yes, the potential markers of advanced heart failure include: multiple recent HF/resp failure admits as noted above in the past 4 months, severe dyspnea/edema despite GDMT, rising BNP.    REFERENCE: Potential markers of advanced HF   Inotrope (dobutamine or milrinone) used during this admission?   LVEF<=25%?   >=2 hospitalizations for ADHF in the last year?   Severe symptoms of HF (fatigue, dyspnea, confusion, edema) despite medical therapy?  "  Downtitration of GDMT as compared to home medications?   Discontinuation of GDMT because of hypotension or renal intolerance?   Recurrent arrhythmias (AF, VT with ICD shocks)?   Cardiac cachexia (i.e., unintentional weight loss due to HF)?   High-risk biomarker profile (e.g., hyponatremia [Na<135], very elevated BNP, worsening kidney function)   Escalating doses of diuretics or persistent edema despite escalation     2. Mind and Emotion  Does this patient have possible cognitive impairment?: Yes (The Mini-Cog score 5/5). Patient passes mini-cog but has multiple psych issues as noted above.  Ask the patient to memorize these 3 words: banana, sunrise, chair  Ask the patient to draw a clock with hands pointing at \"20 minutes after 8\"  Ask the patient to recall the 3 words  Score: Add number of words recalled + clock drawing (0 points for any errors, 2 points if correct)  Interpretation: A score of 0-2 suggests cognitive impairment is present, a score of 3-5 suggests cognitive impairment is absent  Does this patient have major depression?:Possibly (PH-Q2 score 3/6)  Over the last 2 weeks: Little interest or pleasure in doing things? (Not at all +0, Several days +1, More than half the days +2, Nearly every day +3)  Over the last 2 weeks: Feeling down, depressed or hopeless? (Not at all +0, Several days +1, More than half the days +2, Nearly every day +3)  Score: Add points  Interpretation: A score of 3 or more suggests that major depression is likely.     3. Physical Function  Could this patient be frail?: Yes   Defined by presence of all of these: slowness, weakness, shrinking, inactivity, exhaustion  Is this patient at risk for falling?: Yes   Defined by having experienced a fall in the last 12 months.    4. Social Determinants of Health  Transportation deficits?: Yes   Lack of insurance?: No   Poor financial resources?: Yes   Living conditions (homelessness, unstable home)?: Yes   Poor family/social support?: Yes " "  Poor personal care?: Yes   Food insecurity?: Yes   Lack of HCPOA?: No          Recommendations    As a result of this readmit, I discussed his condition at length with Dr. Jo. She stages patient at stage D HF yet feels the respiratory component is likely his advancing COPD and supercedes HF with his constant dyspnea and hypoxia. She will inform patient of his HF stage and order palliative care. I plan to ask the medical team for a pulmonology consult for the purpose of staging his COPD for patient to make an informed decision regarding goals of care.       Addendum: I met with patient later in the afternoon after he was seen by Dr. Jo and palliative care. Pulmonology consult ordered and pt informed for the purpose. We discussed his HF stage and he voices acceptance and gratefulness for the honesty, stating, \"I appreciate that you tell me straight!\" He wishes to defer any final hospice or palliative meetings until his friend/STELLA Cates returns from out of town. He does not wish to have these conversations with her via phone, preferring face-to-face. I am away this weekend and will meet with patient Tuesday when I return to offer support and encouragement as he considers goals of care.      (HF nurse navigator name and contact info)  "

## 2024-08-24 NOTE — PROGRESS NOTES
Subjective   Patient ID: Milton Lane is a 74 y.o. male.    74-year-old male with past medical history of cardiomyopathy and diastolic dysfunction, aortic stenosis, COPD, noncompliance, paroxysmal A-fib, CAD who presented to the hospital with worsening shortness of breath.  Patient noted to be living at hotel, and noted to have a worsening dry cough and increased phlegm production.  Presented to the emergency room with elevated BNP as well as UTI.  Patient also had a remote cerebellar infarct, and lab work showed significant hyponatremia secondary to fluid overload.  Patient was given tolvaptan, and admitted for further management and supportive care.  Patient was difficult to diurese due to underlying CKD, however improved to supportive care IV diuresis and was transitioned to oral loop diuretics.  Patient was subsequently transition to SNF in stable condition.  On evaluation, patient states that he feels significantly fluid overloaded today.  Feels like his legs are much more swollen than usual.  Secondary to this does feel more short of breath than usual.  Blood pressure is slightly low as well.  Otherwise states no additional issues or concerns.        Review of Systems   Constitutional:  Positive for fatigue.   HENT:  Positive for congestion.    Respiratory:  Positive for cough and shortness of breath.    Cardiovascular: Negative.    Gastrointestinal:  Positive for abdominal distention.   Musculoskeletal: Negative.    Skin: Negative.    Neurological:  Positive for weakness.   Psychiatric/Behavioral: Negative.         Objective Visit Vitals  Smoking Status Every Day    Vitals Reviewed via facility EMR   Physical Exam  Constitutional:       General: He is not in acute distress.     Appearance: He is not ill-appearing.   Eyes:      Pupils: Pupils are equal, round, and reactive to light.   Cardiovascular:      Rate and Rhythm: Normal rate and regular rhythm.      Pulses: Normal pulses.      Heart sounds: No murmur  heard.  Pulmonary:      Effort: No respiratory distress.      Breath sounds: No wheezing.      Comments: Mild decreased breath sounds  Abdominal:      General: Abdomen is flat. Bowel sounds are normal. There is no distension.   Musculoskeletal:      Right lower leg: Edema present.      Left lower leg: Edema present.   Skin:     General: Skin is warm and dry.   Neurological:      Mental Status: He is alert. Mental status is at baseline.      Cranial Nerves: No cranial nerve deficit.      Motor: Weakness present.   Psychiatric:         Mood and Affect: Mood normal.         Behavior: Behavior normal.         Assessment/Plan   Diagnoses and all orders for this visit:  Longstanding persistent atrial fibrillation (Multi)  Chronic systolic heart failure (Multi)  Acute on chronic congestive heart failure, unspecified heart failure type (Multi)  Hyponatremia  Chronic obstructive pulmonary disease, unspecified COPD type (Multi)  Hypervolemia, unspecified hypervolemia type      Patient seen and examined at bedside.  Significantly fluid overloaded, and I suspect that this is worsened since the hospitalization.  Due to underlying low blood pressure readings, initiation of midodrine as patient does need to be diuresed.  Increased Bumex to 4 mg daily.  Closely monitor electrolytes obtain repeat chest x-ray.  Encouraged low-salt diet leg elevation and fluid restriction.  Otherwise continue supportive care.  No additional issues.    Reviewed and approved by LANDY JOHNSON on 8/24/24 at 2:05 PM.

## 2024-08-24 NOTE — NURSING NOTE
1000 - Pt seen this am for assessment. Post resp treatment , pt was working to breathe and needed reminders. He was eating too fast and getting himself worked up. Pt educated on slow deep breathing and pacing himself with meals. Pt agitated at times but maintained his stats above 90-92%.     1800 - Pt stable for the rest of the day. He gets SOB and worked up when eating and sleeping. Pt educated on slow deep breathes. He is alert but can be agitated at times. He was complaint with meds and pt has call light in reach.

## 2024-08-24 NOTE — PROGRESS NOTES
Cardiology Progress Note           Rounding Cardiologist:  Dr. Denisse Payne  Primary Cardiologist: Dr. Denisse Payne    Date:  8/24/2024  Patient:  Milton Lane  YOB: 1950  MRN:  [id number]   Admit Date:  8/21/2024      SUBJECTIVE    Milton Lane was seen and examined today at bedside.     Patient looks Vetsch better this morning.  He sitting in his chair with his legs dangling but SCDs in position.  Rocking back and forth.  When asked what is wrong he wants to get up and move.  He did refuse weight this morning but otherwise are doing the best they can in terms of monitoring his intake and output.  He feels his shortness of breath is improved today and clinically he looks much more comfortable.  He has no new complaints today.  No chest pain, no awareness of rapid or irregular heartbeat, no dizziness or lightheadedness.    Review of Systems   No new complaints  VITALS     Vitals:    08/24/24 0125 08/24/24 0819 08/24/24 0820 08/24/24 0834   BP: 103/71   110/59   BP Location:    Left arm   Patient Position:    Sitting   Pulse: 88   86   Resp: (!) 31   (!) 38   Temp:       TempSrc:       SpO2:  95% 95%    Weight:       Height:           Intake/Output Summary (Last 24 hours) at 8/24/2024 1129  Last data filed at 8/24/2024 1000  Gross per 24 hour   Intake 240 ml   Output 1800 ml   Net -1560 ml       Vitals:    08/22/24 0916   Weight: 98.1 kg (216 lb 4.3 oz)       CURRENT MEDICATIONS    amiodarone, 200 mg, oral, Daily  aspirin, 81 mg, oral, Daily  atorvastatin, 20 mg, oral, Nightly  budesonide, 0.5 mg, nebulization, BID  cefTRIAXone, 1 g, intravenous, q24h  cholecalciferol, 1,000 Units, oral, Daily  empagliflozin, 12.5 mg, oral, Daily  fluticasone, 2 spray, Each Nostril, Daily  formoterol, 20 mcg, nebulization, BID  heparin (porcine), 5,000 Units, subcutaneous, q8h  isosorbide mononitrate ER, 30 mg, oral, Daily  lamoTRIgine, 100 mg, oral, Daily  magnesium oxide, 400 mg, oral, Daily  melatonin,  3 mg, oral, Nightly  metoprolol succinate XL, 25 mg, oral, Daily  nicotine, 1 patch, transdermal, q24h  torsemide, 40 mg, oral, BID  traZODone, 50 mg, oral, Nightly         Current Outpatient Medications   Medication Instructions    acetaminophen (TYLENOL) 650 mg, oral, Every 4 hours PRN    albuterol (Ventolin HFA) 90 mcg/actuation inhaler 2 puffs, inhalation, Every 6 hours PRN    albuterol 2.5 mg, nebulization, Every 4 hours PRN    amiodarone (PACERONE) 200 mg, oral, Daily    aspirin 81 mg, oral, Daily    atorvastatin (LIPITOR) 20 mg, oral, Daily    bumetanide (BUMEX) 2 mg, oral, 2 times daily (morning and late afternoon)    cholecalciferol (VITAMIN D-3) 1,000 Units, oral, Daily    empagliflozin (JARDIANCE) 12.5 mg, oral, Daily    fluticasone (Flonase) 50 mcg/actuation nasal spray 2 sprays, Each Nostril, Daily, Shake gently. Before first use, prime pump. After use, clean tip and replace cap.    fluticasone propion-salmeteroL (Advair Diskus) 250-50 mcg/dose diskus inhaler 1 puff, inhalation, 2 times daily RT    isosorbide mononitrate ER (IMDUR) 30 mg, oral, Daily, Do not crush or chew.    lamoTRIgine (LAMICTAL) 100 mg, oral, Daily    lubricating eye drops ophthalmic solution 1 drop, Both Eyes, Every 4 hours PRN    magnesium oxide (MAG-OX) 400 mg, oral, Daily    melatonin 3 mg, oral, Nightly    metoprolol succinate XL (TOPROL-XL) 25 mg, oral, Daily, Do not crush or chew.    midodrine (PROAMATINE) 5 mg, oral, 3 times daily, Hold for sbp>120    nicotine (Nicoderm CQ) 7 mg/24 hr patch 1 patch, transdermal, Every 24 hours    nicotine polacrilex (COMMIT) 2 mg, Mouth/Throat, As needed    oxygen (O2) gas therapy 1 each, inhalation, Continuous    traZODone (DESYREL) 50 mg, oral, Nightly        PHYSICAL EXAMINATION   GENERAL:  Well developed, well nourished, in no acute distress.  CHEST:  Symmetric and nontender.  NECK:  mild JVD, no bruit.  LUNGS: Nonlabored respirations which is much improved from yesterday.  Better  "aeration.  Scant left basilar rales none on the right.  Continues to have prolonged expiratory phase with a few scattered rhonchi.  No wheezing today  HEART: PMI is nonpalpable.  There is an irregular rhythm with a controlled rate and normal S1 and preserved S2.  There is a 2/6 mid peaking crescendo crescendo decrescendo murmur heard best at the right upper sternal border but heard also at the cardiac apex.  No diastolic murmurs appreciated.  There is referred cardiac murmur at the right carotid.  Pedal pulses cannot be palpated and there is persistent lower extremity edema and chronic skin changes legs are softer and appear to be less edematous than yesterday.  ABDOMEN: Soft, NT, ND without palpable organomegaly, normoactive bowel sounds.   EXTREMITIES:  Warm with good color, no clubbing or cyanosis.  There is improved edema noted.  NEURO/PSYCH:  Alert and oriented times three with patient appears to be struggling a little bit today from a psychiatric standpoint and rocking in his chair.  If this continues may want to get psychiatry involved earlier in his hospitalization.    LAB DATA     CBC:   Results from last 7 days   Lab Units 08/24/24  0447   WBC AUTO x10*3/uL 8.6   RBC AUTO x10*6/uL 3.27*   HEMOGLOBIN g/dL 9.8*   HEMATOCRIT % 32.3*   PLATELETS AUTO x10*3/uL 228        CMP:    Results from last 7 days   Lab Units 08/24/24  0447   SODIUM mmol/L 132*   POTASSIUM mmol/L 4.3   CHLORIDE mmol/L 85*   CO2 mmol/L 39*   BUN mg/dL 27*   CREATININE mg/dL 0.87   GLUCOSE mg/dL 100*   CALCIUM mg/dL 8.8       Cardiac Enzymes:    Lab Results   Component Value Date    TROPHS 26 (H) 08/22/2024    TROPHS 25 (H) 08/21/2024    TROPHS 132 (HH) 08/07/2024       Magnesium:    Lab Results   Component Value Date    MG 1.80 08/24/2024       Lipid Profile:  No results found for: \"CHLPL\", \"TRIG\", \"HDL\", \"LDLCALC\", \"LDLDIRECT\"    TSH:  No results found for: \"TSH\"    BNP:   Lab Results   Component Value Date    BNP 1,175 (H) 08/21/2024    " "    PT/INR:  No results found for: \"PROTIME\", \"INR\"    HgBA1c:    Lab Results   Component Value Date    HGBA1C 5.5 01/08/2023       CBC:  Lab Results   Component Value Date    WBC 8.6 08/24/2024    WBC 8.9 08/23/2024    WBC 9.6 08/21/2024    RBC 3.27 (L) 08/24/2024    RBC 3.49 (L) 08/23/2024    RBC 3.48 (L) 08/21/2024    HGB 9.8 (L) 08/24/2024    HGB 10.4 (L) 08/23/2024    HGB 10.3 (L) 08/21/2024    HCT 32.3 (L) 08/24/2024    HCT 34.7 (L) 08/23/2024    HCT 33.2 (L) 08/21/2024    MCV 99 08/24/2024    MCV 99 08/23/2024    MCV 95 08/21/2024    MCH 30.0 08/24/2024    MCH 29.8 08/23/2024    MCH 29.6 08/21/2024    MCHC 30.3 (L) 08/24/2024    MCHC 30.0 (L) 08/23/2024    MCHC 31.0 (L) 08/21/2024    RDW 16.8 (H) 08/24/2024    RDW 17.1 (H) 08/23/2024    RDW 17.0 (H) 08/21/2024     08/24/2024     08/23/2024     08/21/2024       BMP:  Lab Results   Component Value Date     (L) 08/24/2024     (L) 08/23/2024     (L) 08/22/2024    K 4.3 08/24/2024    K 3.7 08/23/2024    K 3.8 08/22/2024    CL 85 (L) 08/24/2024    CL 86 (L) 08/23/2024    CL 85 (L) 08/22/2024    CO2 39 (H) 08/24/2024    CO2 37 (H) 08/23/2024    CO2 43 (HH) 08/22/2024    BUN 27 (H) 08/24/2024    BUN 23 08/23/2024    BUN 20 08/22/2024    CREATININE 0.87 08/24/2024    CREATININE 0.80 08/23/2024    CREATININE 0.71 08/22/2024       Hepatic Function Panel:    Lab Results   Component Value Date    ALKPHOS 136 08/21/2024    ALT 32 08/21/2024    AST 29 08/21/2024    PROT 7.5 08/21/2024    BILITOT 1.1 08/21/2024         DIAGNOSTIC TESTING RESULTS     ECG 12 lead    Result Date: 8/22/2024  Atrial fibrillation with premature ventricular or aberrantly conducted complexes Rightward axis Possible Inferior infarct , age undetermined Abnormal ECG When compared with ECG of 07-AUG-2024 13:19, Previous ECG has undetermined rhythm, needs review Borderline criteria for Inferior infarct are now Present Inverted T waves have replaced nonspecific T " wave abnormality in Inferior leads Nonspecific T wave abnormality now evident in Lateral leads QT has shortened    CT angio chest for pulmonary embolism    Result Date: 8/22/2024  STUDY: CT Angiogram of the Chest; 08/22/2024, 1:39 AM INDICATION: Shortness of breath, hypoxia, evaluate for a PE. COMPARISON: CTA chest 01/08/2023. ACCESSION NUMBER(S): [id number] ORDERING CLINICIAN: VINEET JOHNSON TECHNIQUE:  CTA of the chest was performed with intravenous contrast. Images are reviewed and processed at a workstation according to the CT angiogram protocol with 3-D and/or MIP post processing imaging generated.  Omnipaque 350, 75 mL was administered intravenously. Automated mA/kV exposure control was utilized and patient examination was performed in strict accordance with principles of ALARA. FINDINGS: Pulmonary arteries are adequately opacified without acute or chronic filling defects.  The thoracic aorta is normal in course and caliber without dissection or aneurysm. There is moderate cardiomegaly.  There is borderline enlargement of the mediastinal lymph nodes most likely due to congestion. There is no pleural effusion, pleural thickening, or pneumothorax. The airways are patent. Small left and moderate right pleural effusion. There is compressive atelectasis in the right middle lobe and right lower lobe.  There is diffuse interstitial thickening both lungs suggesting pulmonary edema. Upper abdomen demonstrates no acute pathology. There are no acute fractures.  No suspicious bony lesions.    1.No evidence of pulmonary embolism. 2.Findings most consistent with congestive heart failure. Signed by Tyrone Ruiz MD    XR chest 1 view    Result Date: 8/22/2024  Interpreted By:  Bryon Ruff, STUDY: XR CHEST 1 VIEW;  8/21/2024 11:22 pm   INDICATION: Signs/Symptoms:SOB.   COMPARISON: CXR 08/07/2024   ACCESSION NUMBER(S): [id number]   ORDERING CLINICIAN: VINEET JOHNSON   FINDINGS:     CARDIOMEDIASTINAL SILHOUETTE:  Cardiomediastinal silhouette is stable in size and configuration.   LUNGS: Bilateral small pleural effusions. Bibasilar consolidations, similar to prior. Background of pulmonary edema.   ABDOMEN: No remarkable upper abdominal findings.   BONES: No acute osseous abnormality.       Findings are similar to 08/07/2024. There is background of pulmonary edema and bilateral small pleural effusions with adjacent lower lobe consolidations which may reflect compressive atelectasis but superimposed infection or aspiration is difficult to exclude.   MACRO: None   Signed by: Bryon Ruff 8/22/2024 12:18 AM Dictation workstation:   [id number]         RADIOLOGY     CT angio chest for pulmonary embolism   Final Result   1.No evidence of pulmonary embolism.   2.Findings most consistent with congestive heart failure.   Signed by Tyrone Ruiz MD      XR chest 1 view   Final Result   Findings are similar to 08/07/2024. There is background of pulmonary   edema and bilateral small pleural effusions with adjacent lower lobe   consolidations which may reflect compressive atelectasis but   superimposed infection or aspiration is difficult to exclude.        MACRO:   None        Signed by: Bryon Ruff 8/22/2024 12:18 AM   Dictation workstation:   [id number]            ASSESSMENT     Problem List Items Addressed This Visit       * (Principal) Acute on chronic congestive heart failure, unspecified heart failure type (Multi) - Primary    Relevant Medications    midodrine (Proamatine) 5 mg tablet    isosorbide mononitrate ER (Imdur) 24 hr tablet 30 mg    metoprolol succinate XL (Toprol-XL) 24 hr tablet 25 mg     Other Visit Diagnoses       Hypoxia        Pleural effusion                Patient Active Problem List   Diagnosis    Coronary artery disease involving native coronary artery of native heart without angina pectoris    Chronic systolic heart failure (Multi)    Nicotine dependence, uncomplicated    Nonrheumatic aortic valve  stenosis    Shortness of breath    Acute on chronic combined systolic (congestive) and diastolic (congestive) heart failure (Multi)    Hyponatremia    Hypervolemia    Acute on chronic hypoxic respiratory failure (Multi)    COPD (chronic obstructive pulmonary disease) (Multi)    Abnormal urinalysis    Acute on chronic congestive heart failure, unspecified heart failure type (Multi)    Acute cystitis without hematuria       PLAN     1.  Acute on chronic systolic and diastolic congestive heart failure.  Although the patient recent ejection fraction was read as being normal if you look at the options of all of the different ejection fractions obtained 1 is still at the 40% that he had been for many years previously.  He is improving with diuretic therapy but were starting to see some contraction alkalosis in his labs.  At this point in time I would continue with torsemide 40 mg twice a day.  If he has more increase in his BUN/creatinine tomorrow I would decrease him to 40 mg daily and see if we can maintain on that dose.  Would opt for potential discharge Monday or Tuesday if he continues to improve we will want him to be stable on his outpatient dose prior to discharge.  2.  Coronary artery disease.  Stable without angina pectoris continue conservative medical management.  3.  Persistent atrial fibrillation.  Remains in atrial fibrillation during this and his last hospitalization but there is an EKG in his chart from June that showed sinus rhythm so for now we will continue his oral amiodarone therapy.  May need to consider down the road what his A-fib burden is and discussed whether the amiodarone is really providing any type of effective treatment for the patient.  4.  Aortic stenosis.  Last echocardiogram showed mild mild to moderate aortic stenosis it is not severe.  Certainly this does complicate his heart failure management.        Please do not hesitate to call with questions.  Electronically signed by Denisse BARCLAY  MD Elmer, on 8/24/2024 at 11:29 AM

## 2024-08-25 VITALS
HEART RATE: 84 BPM | DIASTOLIC BLOOD PRESSURE: 90 MMHG | BODY MASS INDEX: 32.03 KG/M2 | TEMPERATURE: 97.9 F | WEIGHT: 216.27 LBS | RESPIRATION RATE: 22 BRPM | SYSTOLIC BLOOD PRESSURE: 112 MMHG | HEIGHT: 69 IN | OXYGEN SATURATION: 95 %

## 2024-08-25 LAB
ANION GAP SERPL CALC-SCNC: 13 MMOL/L (ref 10–20)
BNP SERPL-MCNC: 938 PG/ML (ref 0–99)
BUN SERPL-MCNC: 31 MG/DL (ref 6–23)
CALCIUM SERPL-MCNC: 8.7 MG/DL (ref 8.6–10.3)
CHLORIDE SERPL-SCNC: 83 MMOL/L (ref 98–107)
CO2 SERPL-SCNC: 38 MMOL/L (ref 21–32)
CREAT SERPL-MCNC: 0.93 MG/DL (ref 0.5–1.3)
EGFRCR SERPLBLD CKD-EPI 2021: 85 ML/MIN/1.73M*2
ERYTHROCYTE [DISTWIDTH] IN BLOOD BY AUTOMATED COUNT: 16.7 % (ref 11.5–14.5)
GLUCOSE SERPL-MCNC: 102 MG/DL (ref 74–99)
HCT VFR BLD AUTO: 30.7 % (ref 41–52)
HGB BLD-MCNC: 9.3 G/DL (ref 13.5–17.5)
MAGNESIUM SERPL-MCNC: 1.8 MG/DL (ref 1.6–2.4)
MCH RBC QN AUTO: 29.4 PG (ref 26–34)
MCHC RBC AUTO-ENTMCNC: 30.3 G/DL (ref 32–36)
MCV RBC AUTO: 97 FL (ref 80–100)
NRBC BLD-RTO: 0 /100 WBCS (ref 0–0)
PLATELET # BLD AUTO: 240 X10*3/UL (ref 150–450)
POTASSIUM SERPL-SCNC: 4.5 MMOL/L (ref 3.5–5.3)
RBC # BLD AUTO: 3.16 X10*6/UL (ref 4.5–5.9)
SODIUM SERPL-SCNC: 129 MMOL/L (ref 136–145)
WBC # BLD AUTO: 7.9 X10*3/UL (ref 4.4–11.3)

## 2024-08-25 PROCEDURE — 71045 X-RAY EXAM CHEST 1 VIEW: CPT | Performed by: RADIOLOGY

## 2024-08-25 PROCEDURE — 99232 SBSQ HOSP IP/OBS MODERATE 35: CPT | Performed by: INTERNAL MEDICINE

## 2024-08-25 PROCEDURE — 97535 SELF CARE MNGMENT TRAINING: CPT | Mod: GO,CO

## 2024-08-25 PROCEDURE — 2500000004 HC RX 250 GENERAL PHARMACY W/ HCPCS (ALT 636 FOR OP/ED): Performed by: NURSE PRACTITIONER

## 2024-08-25 PROCEDURE — 2500000001 HC RX 250 WO HCPCS SELF ADMINISTERED DRUGS (ALT 637 FOR MEDICARE OP): Performed by: INTERNAL MEDICINE

## 2024-08-25 PROCEDURE — 2060000001 HC INTERMEDIATE ICU ROOM DAILY

## 2024-08-25 PROCEDURE — 99233 SBSQ HOSP IP/OBS HIGH 50: CPT | Performed by: INTERNAL MEDICINE

## 2024-08-25 PROCEDURE — 2500000002 HC RX 250 W HCPCS SELF ADMINISTERED DRUGS (ALT 637 FOR MEDICARE OP, ALT 636 FOR OP/ED): Performed by: INTERNAL MEDICINE

## 2024-08-25 PROCEDURE — 2500000002 HC RX 250 W HCPCS SELF ADMINISTERED DRUGS (ALT 637 FOR MEDICARE OP, ALT 636 FOR OP/ED): Performed by: NURSE PRACTITIONER

## 2024-08-25 PROCEDURE — 2500000005 HC RX 250 GENERAL PHARMACY W/O HCPCS: Performed by: INTERNAL MEDICINE

## 2024-08-25 PROCEDURE — 94640 AIRWAY INHALATION TREATMENT: CPT

## 2024-08-25 PROCEDURE — 36415 COLL VENOUS BLD VENIPUNCTURE: CPT | Performed by: NURSE PRACTITIONER

## 2024-08-25 PROCEDURE — S4991 NICOTINE PATCH NONLEGEND: HCPCS | Performed by: NURSE PRACTITIONER

## 2024-08-25 PROCEDURE — 2500000001 HC RX 250 WO HCPCS SELF ADMINISTERED DRUGS (ALT 637 FOR MEDICARE OP): Performed by: NURSE PRACTITIONER

## 2024-08-25 RX ORDER — MAGNESIUM SULFATE HEPTAHYDRATE 40 MG/ML
2 INJECTION, SOLUTION INTRAVENOUS ONCE
Status: COMPLETED | OUTPATIENT
Start: 2024-08-25 | End: 2024-08-25

## 2024-08-25 ASSESSMENT — PAIN SCALES - GENERAL
PAINLEVEL_OUTOF10: 0 - NO PAIN

## 2024-08-25 ASSESSMENT — PAIN - FUNCTIONAL ASSESSMENT
PAIN_FUNCTIONAL_ASSESSMENT: 0-10

## 2024-08-25 ASSESSMENT — COGNITIVE AND FUNCTIONAL STATUS - GENERAL
TURNING FROM BACK TO SIDE WHILE IN FLAT BAD: A LOT
TOILETING: A LOT
DRESSING REGULAR UPPER BODY CLOTHING: A LOT
TOILETING: TOTAL
PERSONAL GROOMING: A LITTLE
CLIMB 3 TO 5 STEPS WITH RAILING: A LOT
HELP NEEDED FOR BATHING: TOTAL
DAILY ACTIVITIY SCORE: 12
STANDING UP FROM CHAIR USING ARMS: A LITTLE
WALKING IN HOSPITAL ROOM: A LITTLE
DRESSING REGULAR LOWER BODY CLOTHING: TOTAL
DRESSING REGULAR LOWER BODY CLOTHING: TOTAL
HELP NEEDED FOR BATHING: A LOT
MOBILITY SCORE: 16
EATING MEALS: A LITTLE
MOVING FROM LYING ON BACK TO SITTING ON SIDE OF FLAT BED WITH BEDRAILS: A LITTLE
DAILY ACTIVITIY SCORE: 12
DRESSING REGULAR UPPER BODY CLOTHING: A LOT
MOVING TO AND FROM BED TO CHAIR: A LITTLE
PERSONAL GROOMING: A LOT

## 2024-08-25 ASSESSMENT — ACTIVITIES OF DAILY LIVING (ADL): HOME_MANAGEMENT_TIME_ENTRY: 24

## 2024-08-25 NOTE — PROGRESS NOTES
"Occupational Therapy    OT Treatment    Patient Name: Milton Lane  MRN: [id number]  Today's Date: 8/25/2024  Time Calculation  Start Time: 1403  Stop Time: 1427  Time Calculation (min): 24 min       2104/2104-A    Assessment:  End of Session Communication: Bedside nurse  End of Session Patient Position: Up in chair, Alarm on       Plan:  OT Frequency: 3 times per week  OT Discharge Recommendations: Moderate intensity level of continued care     Subjective      08/25/24 1403   OT Last Visit   OT Received On 08/25/24   General   Reason for Referral ADLs   Referred By Rishi Anthony MD   Prior to Session Communication Bedside nurse   Patient Position Received Up in chair;Alarm on   General Comment Pt asleep in chair upon arrival, awakens with increased volume; cleared for participation although light/seated activity due to increased oxygen needs this date.     Session limited as pt is falling asleep during seated ADLs, easily agitated telling this RESENDIZ \"I want you to shut up\" when cued to complete tasks.   Precautions   Hearing/Visual Limitations [place]   Medical Precautions Fall precautions;Oxygen therapy device and L/min   Precautions Comment 15L O2 via oxymizer   Vital Signs   SpO2   (91-95% during seated activity)   Pain Assessment   Pain Location   (pt initially reports no pain, then c/o headache and reports has had one - nursing informed)   Cognition   Overall Cognitive Status WFL   Grooming   Grooming Level of Assistance Setup   Grooming Where Assessed   (recliner)   Grooming Comments Pt completed light grooming tasks from seated level with setup of supplies including oral care and hair combing. Pt falling asleep during oral hygiene, agitated when cued.   Transfer 1   Trials/Comments 1 Did not attempt transfers this date as pt unable to stay awake at this time. Instructed pt to scoot hips back into recliner with follow through. Pt refused to elevate legs for safety, nursing aware.   IP OT Assessment   End of " Session Communication Bedside nurse   End of Session Patient Position Up in chair;Alarm on   Inpatient Plan   OT Frequency 3 times per week   OT Discharge Recommendations Moderate intensity level of continued care     Outcome Measures:[facility] Daily Activity  Putting on and taking off regular lower body clothing: Total  Bathing (including washing, rinsing, drying): A lot  Putting on and taking off regular upper body clothing: A lot  Toileting, which includes using toilet, bedpan or urinal: Total  Taking care of personal grooming such as brushing teeth: A little  Eating Meals: A little  Daily Activity - Total Score: 12  Education Documentation  No documentation found.  Education Comments  No comments found.            Goals:  Encounter Problems       Encounter Problems (Active)       Dressing Upper Extremities       STG - Patient will dress upper body with min assist  (Progressing)       Start:  08/22/24    Expected End:  08/26/24               Dressings Lower Extremities       STG - Patient will complete lower body dressing with min assist with comp strategies  (Progressing)       Start:  08/22/24    Expected End:  08/26/24               Functional Balance       STG-Patient will be SBA with assistive device dynamic stand task >5 minutes for ADL completion  (Progressing)       Start:  08/22/24    Expected End:  09/05/24               Functional Mobility       STG-Patient will be SBA with assistive device functional mobility tasks   (Progressing)       Start:  08/22/24    Expected End:  09/05/24               OT Transfers       STG-Patient will be SBA with functional transfers demonstrating good safety   (Progressing)       Start:  08/22/24    Expected End:  09/05/24

## 2024-08-25 NOTE — CARE PLAN
Problem: Pain - Adult  Goal: Verbalizes/displays adequate comfort level or baseline comfort level  Outcome: Progressing     Problem: Safety - Adult  Goal: Free from fall injury  Outcome: Progressing     Problem: Discharge Planning  Goal: Discharge to home or other facility with appropriate resources  Outcome: Progressing     Problem: Chronic Conditions and Co-morbidities  Goal: Patient's chronic conditions and co-morbidity symptoms are monitored and maintained or improved  Outcome: Progressing     Problem: Heart Failure  Goal: Improved gas exchange this shift  Outcome: Progressing     Problem: Skin  Goal: Prevent/manage excess moisture  Outcome: Progressing  Goal: Promote/optimize nutrition  Outcome: Progressing  Flowsheets (Taken 8/25/2024 1717)  Promote/optimize nutrition: Consume > 50% meals/supplements  Goal: Promote skin healing  Outcome: Progressing  Flowsheets (Taken 8/24/2024 1802 by Idalmis Gunderson RN)  Promote skin healing: Turn/reposition every 2 hours/use positioning/transfer devices     Problem: Heart Failure  Goal: Improved urinary output this shift  Outcome: Met  Goal: Reduction in peripheral edema within 24 hours  Outcome: Met      The clinical goals for the shift include Pt will remain HDS throughout shift.

## 2024-08-25 NOTE — PROGRESS NOTES
"Subjective  Patient had uneventful night does not complain about any chest pain continue to be short of breath on high flow oxygen  Nursing staff was interviewed  Objectives    Last Recorded Vitals  Blood pressure 108/57, pulse 82, temperature 36.5 °C (97.7 °F), temperature source Temporal, resp. rate 26, height 1.753 m (5' 9\"), weight 98.1 kg (216 lb 4.3 oz), SpO2 92%.    Physical Exam  HENT:      Right Ear: External ear normal.      Left Ear: External ear normal.      Mouth/Throat:      Mouth: Mucous membranes are moist.   Cardiovascular:      Rate and Rhythm: Normal rate and regular rhythm.      Heart sounds: No murmur heard.     No friction rub. No gallop.   Pulmonary:      Effort: No accessory muscle usage or respiratory distress.      Breath sounds: No stridor. No wheezing or rhonchi.   Chest:      Chest wall: No tenderness.   Abdominal:      General: There is no distension.      Palpations: There is no mass.      Tenderness: There is no abdominal tenderness. There is no guarding or rebound.   Musculoskeletal:         General: No deformity or signs of injury.      Cervical back: No rigidity or tenderness. Normal range of motion.      Right lower leg: No edema.      Left lower leg: No edema.   Skin:     Coloration: Skin is not jaundiced or pale.      Findings: No lesion.   Neurological:      General: No focal deficit present.      Mental Status: He is alert, oriented to person, place, and time and easily aroused.      Cranial Nerves: No cranial nerve deficit.      Sensory: No sensory deficit.      Motor: No weakness.        Labs    Admission on 08/21/2024   Component Date Value Ref Range Status    WBC 08/21/2024 9.6  4.4 - 11.3 x10*3/uL Final    nRBC 08/21/2024 0.0  0.0 - 0.0 /100 WBCs Final    RBC 08/21/2024 3.48 (L)  4.50 - 5.90 x10*6/uL Final    Hemoglobin 08/21/2024 10.3 (L)  13.5 - 17.5 g/dL Final    Hematocrit 08/21/2024 33.2 (L)  41.0 - 52.0 % Final    MCV 08/21/2024 95  80 - 100 fL Final    MCH " 08/21/2024 29.6  26.0 - 34.0 pg Final    MCHC 08/21/2024 31.0 (L)  32.0 - 36.0 g/dL Final    RDW 08/21/2024 17.0 (H)  11.5 - 14.5 % Final    Platelets 08/21/2024 216  150 - 450 x10*3/uL Final    Neutrophils % 08/21/2024 81.5  40.0 - 80.0 % Final    Immature Granulocytes %, Automated 08/21/2024 0.7  0.0 - 0.9 % Final    Immature Granulocyte Count (IG) includes promyelocytes, myelocytes and metamyelocytes but does not include bands. Percent differential counts (%) should be interpreted in the context of the absolute cell counts (cells/UL).    Lymphocytes % 08/21/2024 5.0  13.0 - 44.0 % Final    Monocytes % 08/21/2024 12.6  2.0 - 10.0 % Final    Eosinophils % 08/21/2024 0.0  0.0 - 6.0 % Final    Basophils % 08/21/2024 0.2  0.0 - 2.0 % Final    Neutrophils Absolute 08/21/2024 7.84 (H)  1.60 - 5.50 x10*3/uL Final    Percent differential counts (%) should be interpreted in the context of the absolute cell counts (cells/uL).    Immature Granulocytes Absolute, Au* 08/21/2024 0.07  0.00 - 0.50 x10*3/uL Final    Lymphocytes Absolute 08/21/2024 0.48 (L)  0.80 - 3.00 x10*3/uL Final    Monocytes Absolute 08/21/2024 1.21 (H)  0.05 - 0.80 x10*3/uL Final    Eosinophils Absolute 08/21/2024 0.00  0.00 - 0.40 x10*3/uL Final    Basophils Absolute 08/21/2024 0.02  0.00 - 0.10 x10*3/uL Final    Glucose 08/21/2024 115 (H)  74 - 99 mg/dL Final    Sodium 08/21/2024 134 (L)  136 - 145 mmol/L Final    Potassium 08/21/2024 3.7  3.5 - 5.3 mmol/L Final    Chloride 08/21/2024 86 (L)  98 - 107 mmol/L Final    Bicarbonate 08/21/2024 42 (HH)  21 - 32 mmol/L Final    Anion Gap 08/21/2024 10  10 - 20 mmol/L Final    Urea Nitrogen 08/21/2024 20  6 - 23 mg/dL Final    Creatinine 08/21/2024 0.80  0.50 - 1.30 mg/dL Final    eGFR 08/21/2024 >90  >60 mL/min/1.73m*2 Final    Calculations of estimated GFR are performed using the 2021 CKD-EPI Study Refit equation without the race variable for the IDMS-Traceable creatinine  methods.  https://jasn.asnjournals.org/content/early/2021/09/22/ASN.3182473548    Calcium 08/21/2024 8.9  8.6 - 10.3 mg/dL Final    Albumin 08/21/2024 3.8  3.4 - 5.0 g/dL Final    Alkaline Phosphatase 08/21/2024 136  33 - 136 U/L Final    Total Protein 08/21/2024 7.5  6.4 - 8.2 g/dL Final    AST 08/21/2024 29  9 - 39 U/L Final    Bilirubin, Total 08/21/2024 1.1  0.0 - 1.2 mg/dL Final    ALT 08/21/2024 32  10 - 52 U/L Final    Patients treated with Sulfasalazine may generate falsely decreased results for ALT.    Magnesium 08/21/2024 1.82  1.60 - 2.40 mg/dL Final    BNP 08/21/2024 1,175 (H)  0 - 99 pg/mL Final    Coronavirus 2019, PCR 08/21/2024 Not Detected  Not Detected Final    D-Dimer, Quantitative VTE Exclusion 08/21/2024 1,443 (H)  <=500 ng/mL FEU Final    Troponin I, High Sensitivity 08/21/2024 25 (H)  0 - 20 ng/L Final    Troponin I, High Sensitivity 08/22/2024 26 (H)  0 - 20 ng/L Final    Ventricular Rate 08/21/2024 99  BPM Preliminary    Atrial Rate 08/21/2024 96  BPM Preliminary    QRS Duration 08/21/2024 98  ms Preliminary    QT Interval 08/21/2024 332  ms Preliminary    QTC Calculation(Bazett) 08/21/2024 426  ms Preliminary    R Axis 08/21/2024 90  degrees Preliminary    T Axis 08/21/2024 -33  degrees Preliminary    QRS Count 08/21/2024 17  beats Preliminary    Q Onset 08/21/2024 209  ms Preliminary    T Offset 08/21/2024 375  ms Preliminary    QTC Fredericia 08/21/2024 392  ms Preliminary    Color, Urine 08/22/2024 Light-Yellow  Light-Yellow, Yellow, Dark-Yellow Final    Appearance, Urine 08/22/2024 Clear  Clear Final    Specific Gravity, Urine 08/22/2024 1.014  1.005 - 1.035 Final    pH, Urine 08/22/2024 6.0  5.0, 5.5, 6.0, 6.5, 7.0, 7.5, 8.0 Final    Protein, Urine 08/22/2024 NEGATIVE  NEGATIVE, 10 (TRACE), 20 (TRACE) mg/dL Final    Glucose, Urine 08/22/2024 OVER (4+) (A)  Normal mg/dL Final    Blood, Urine 08/22/2024 NEGATIVE  NEGATIVE Final    Ketones, Urine 08/22/2024 NEGATIVE  NEGATIVE mg/dL Final     Bilirubin, Urine 08/22/2024 NEGATIVE  NEGATIVE Final    Urobilinogen, Urine 08/22/2024 2 (1+) (A)  Normal mg/dL Final    Due to a manufacturing issue, low positive urobilinogen results may be falsely positive. Correlate with urine bilirubin and additional clinical/laboratory findings to assess the risk of hemolytic anemia or liver disease. If clinically indicated, repeat testing with an alternate method is available by contacting the laboratory within 24 hours.    Some pigments and medications may cause a false positive urobilinogen.    Nitrite, Urine 08/22/2024 NEGATIVE  NEGATIVE Final    Leukocyte Esterase, Urine 08/22/2024 75 [place]stance/µL (A)  NEGATIVE Final    WBC, Urine 08/22/2024 1-5  1-5, NONE /HPF Final    RBC, Urine 08/22/2024 3-5  NONE, 1-2, 3-5 /HPF Final    Squamous Epithelial Cells, Urine 08/22/2024 1-9 (SPARSE)  Reference range not established. /HPF Final    Glucose 08/22/2024 132 (H)  74 - 99 mg/dL Final    Sodium 08/22/2024 133 (L)  136 - 145 mmol/L Final    Potassium 08/22/2024 3.8  3.5 - 5.3 mmol/L Final    Chloride 08/22/2024 85 (L)  98 - 107 mmol/L Final    Bicarbonate 08/22/2024 43 (HH)  21 - 32 mmol/L Final    Previous result verified on 8/21/2024 2338 on specimen/case [id number] called with component BIC for procedure Comprehensive metabolic panel with value 42 mmol/L.    Anion Gap 08/22/2024 9 (L)  10 - 20 mmol/L Final    Urea Nitrogen 08/22/2024 20  6 - 23 mg/dL Final    Creatinine 08/22/2024 0.71  0.50 - 1.30 mg/dL Final    eGFR 08/22/2024 >90  >60 mL/min/1.73m*2 Final    Calculations of estimated GFR are performed using the 2021 CKD-EPI Study Refit equation without the race variable for the IDMS-Traceable creatinine methods.  https://jasn.asnjournals.org/content/early/2021/09/22/ASN.9072429553    Calcium 08/22/2024 8.6  8.6 - 10.3 mg/dL Final    WBC 08/23/2024 8.9  4.4 - 11.3 x10*3/uL Final    nRBC 08/23/2024 0.0  0.0 - 0.0 /100 WBCs Final    RBC 08/23/2024 3.49 (L)  4.50 - 5.90 x10*6/uL  Final    Hemoglobin 08/23/2024 10.4 (L)  13.5 - 17.5 g/dL Final    Hematocrit 08/23/2024 34.7 (L)  41.0 - 52.0 % Final    MCV 08/23/2024 99  80 - 100 fL Final    MCH 08/23/2024 29.8  26.0 - 34.0 pg Final    MCHC 08/23/2024 30.0 (L)  32.0 - 36.0 g/dL Final    RDW 08/23/2024 17.1 (H)  11.5 - 14.5 % Final    Platelets 08/23/2024 208  150 - 450 x10*3/uL Final    Glucose 08/23/2024 131 (H)  74 - 99 mg/dL Final    Sodium 08/23/2024 133 (L)  136 - 145 mmol/L Final    Potassium 08/23/2024 3.7  3.5 - 5.3 mmol/L Final    Chloride 08/23/2024 86 (L)  98 - 107 mmol/L Final    Bicarbonate 08/23/2024 37 (H)  21 - 32 mmol/L Final    Anion Gap 08/23/2024 14  10 - 20 mmol/L Final    Urea Nitrogen 08/23/2024 23  6 - 23 mg/dL Final    Creatinine 08/23/2024 0.80  0.50 - 1.30 mg/dL Final    eGFR 08/23/2024 >90  >60 mL/min/1.73m*2 Final    Calculations of estimated GFR are performed using the 2021 CKD-EPI Study Refit equation without the race variable for the IDMS-Traceable creatinine methods.  https://jasn.asnjournals.org/content/early/2021/09/22/ASN.8699823590    Calcium 08/23/2024 8.8  8.6 - 10.3 mg/dL Final    Magnesium 08/23/2024 1.88  1.60 - 2.40 mg/dL Final    WBC 08/24/2024 8.6  4.4 - 11.3 x10*3/uL Final    nRBC 08/24/2024 0.0  0.0 - 0.0 /100 WBCs Final    RBC 08/24/2024 3.27 (L)  4.50 - 5.90 x10*6/uL Final    Hemoglobin 08/24/2024 9.8 (L)  13.5 - 17.5 g/dL Final    Hematocrit 08/24/2024 32.3 (L)  41.0 - 52.0 % Final    MCV 08/24/2024 99  80 - 100 fL Final    MCH 08/24/2024 30.0  26.0 - 34.0 pg Final    MCHC 08/24/2024 30.3 (L)  32.0 - 36.0 g/dL Final    RDW 08/24/2024 16.8 (H)  11.5 - 14.5 % Final    Platelets 08/24/2024 228  150 - 450 x10*3/uL Final    Glucose 08/24/2024 100 (H)  74 - 99 mg/dL Final    Sodium 08/24/2024 132 (L)  136 - 145 mmol/L Final    Potassium 08/24/2024 4.3  3.5 - 5.3 mmol/L Final    Chloride 08/24/2024 85 (L)  98 - 107 mmol/L Final    Bicarbonate 08/24/2024 39 (H)  21 - 32 mmol/L Final    Anion Gap  08/24/2024 12  10 - 20 mmol/L Final    Urea Nitrogen 08/24/2024 27 (H)  6 - 23 mg/dL Final    Creatinine 08/24/2024 0.87  0.50 - 1.30 mg/dL Final    eGFR 08/24/2024 89  >60 mL/min/1.73m*2 Final    Calculations of estimated GFR are performed using the 2021 CKD-EPI Study Refit equation without the race variable for the IDMS-Traceable creatinine methods.  https://jasn.asnjournals.org/content/early/2021/09/22/ASN.7652821345    Calcium 08/24/2024 8.8  8.6 - 10.3 mg/dL Final    Magnesium 08/24/2024 1.80  1.60 - 2.40 mg/dL Final    WBC 08/25/2024 7.9  4.4 - 11.3 x10*3/uL Final    nRBC 08/25/2024 0.0  0.0 - 0.0 /100 WBCs Final    RBC 08/25/2024 3.16 (L)  4.50 - 5.90 x10*6/uL Final    Hemoglobin 08/25/2024 9.3 (L)  13.5 - 17.5 g/dL Final    Hematocrit 08/25/2024 30.7 (L)  41.0 - 52.0 % Final    MCV 08/25/2024 97  80 - 100 fL Final    MCH 08/25/2024 29.4  26.0 - 34.0 pg Final    MCHC 08/25/2024 30.3 (L)  32.0 - 36.0 g/dL Final    RDW 08/25/2024 16.7 (H)  11.5 - 14.5 % Final    Platelets 08/25/2024 240  150 - 450 x10*3/uL Final    Glucose 08/25/2024 102 (H)  74 - 99 mg/dL Final    Sodium 08/25/2024 129 (L)  136 - 145 mmol/L Final    Potassium 08/25/2024 4.5  3.5 - 5.3 mmol/L Final    Chloride 08/25/2024 83 (L)  98 - 107 mmol/L Final    Bicarbonate 08/25/2024 38 (H)  21 - 32 mmol/L Final    Anion Gap 08/25/2024 13  10 - 20 mmol/L Final    Urea Nitrogen 08/25/2024 31 (H)  6 - 23 mg/dL Final    Creatinine 08/25/2024 0.93  0.50 - 1.30 mg/dL Final    eGFR 08/25/2024 85  >60 mL/min/1.73m*2 Final    Calculations of estimated GFR are performed using the 2021 CKD-EPI Study Refit equation without the race variable for the IDMS-Traceable creatinine methods.  https://jasn.asnjournals.org/content/early/2021/09/22/ASN.7151877246    Calcium 08/25/2024 8.7  8.6 - 10.3 mg/dL Final    Magnesium 08/25/2024 1.80  1.60 - 2.40 mg/dL Final         Imaging     ECG 12 lead  Atrial fibrillation with premature ventricular or aberrantly conducted  complexes  Rightward axis  Possible Inferior infarct , age undetermined  Abnormal ECG  When compared with ECG of 07-AUG-2024 13:19,  Previous ECG has undetermined rhythm, needs review  Borderline criteria for Inferior infarct are now Present  Inverted T waves have replaced nonspecific T wave abnormality in Inferior leads  Nonspecific T wave abnormality now evident in Lateral leads  QT has shortened  CT angio chest for pulmonary embolism  Narrative: STUDY:  CT Angiogram of the Chest; 08/22/2024, 1:39 AM  INDICATION:  Shortness of breath, hypoxia, evaluate for a PE.  COMPARISON:  CTA chest 01/08/2023.  ACCESSION NUMBER(S):  [id number]  ORDERING CLINICIAN:  VINEET JOHNSON  TECHNIQUE:  CTA of the chest was performed with intravenous contrast.   Images are reviewed and processed at a workstation according to the CT  angiogram protocol with 3-D and/or MIP post processing imaging  generated.  Omnipaque 350, 75 mL was administered intravenously.  Automated mA/kV exposure control was utilized and patient examination  was performed in strict accordance with principles of ALARA.  FINDINGS:  Pulmonary arteries are adequately opacified without acute or chronic  filling defects.  The thoracic aorta is normal in course and caliber  without dissection or aneurysm.  There is moderate cardiomegaly.  There is borderline enlargement of  the mediastinal lymph nodes most likely due to congestion.  There is no pleural effusion, pleural thickening, or pneumothorax.   The airways are patent.  Small left and moderate right pleural effusion.  There is compressive atelectasis in the right middle lobe and right  lower lobe.  There is diffuse interstitial thickening both lungs  suggesting pulmonary edema.  Upper abdomen demonstrates no acute pathology.  There are no acute fractures.  No suspicious bony lesions.  Impression: 1.No evidence of pulmonary embolism.  2.Findings most consistent with congestive heart failure.  Signed by Tyrone  MD Sara  XR chest 1 view  Narrative: Interpreted By:  Bryon Ruff,   STUDY:  XR CHEST 1 VIEW;  8/21/2024 11:22 pm      INDICATION:  Signs/Symptoms:SOB.      COMPARISON:  CXR 08/07/2024      ACCESSION NUMBER(S):  [id number]      ORDERING CLINICIAN:  VINEET JOHNSON      FINDINGS:          CARDIOMEDIASTINAL SILHOUETTE:  Cardiomediastinal silhouette is stable in size and configuration.      LUNGS:  Bilateral small pleural effusions. Bibasilar consolidations, similar  to prior. Background of pulmonary edema.      ABDOMEN:  No remarkable upper abdominal findings.      BONES:  No acute osseous abnormality.      Impression: Findings are similar to 08/07/2024. There is background of pulmonary  edema and bilateral small pleural effusions with adjacent lower lobe  consolidations which may reflect compressive atelectasis but  superimposed infection or aspiration is difficult to exclude.      MACRO:  None      Signed by: Bryon Ruff 8/22/2024 12:18 AM  Dictation workstation:   [id number]       Patient Active Problem List   Diagnosis    Atherosclerosis of native coronary artery of native heart without angina pectoris    Chronic systolic heart failure (Multi)    Nicotine dependence, uncomplicated    Nonrheumatic aortic valve stenosis    Shortness of breath    Acute on chronic combined systolic and diastolic CHF, NYHA class 4 (Multi)    Hyponatremia    Hypervolemia    Acute on chronic hypoxic respiratory failure (Multi)    COPD (chronic obstructive pulmonary disease) (Multi)    Abnormal urinalysis    Acute on chronic congestive heart failure, unspecified heart failure type (Multi)    Acute cystitis without hematuria    Longstanding persistent atrial fibrillation (Multi)         Assessment/Plan   Assessment & Plan  Acute cystitis without hematuria  On antibiotics Rocephin waiting for final culture  Atherosclerosis of native coronary artery of native heart without angina pectoris    Nonrheumatic aortic valve  stenosis    Shortness of breath    Acute on chronic combined systolic and diastolic CHF, NYHA class 4 (Multi)    Acute on chronic hypoxic respiratory failure (Multi)  Continue diuresis the patient still on high flow oxygen continue with diuresing wean off oxygen  Longstanding persistent atrial fibrillation (Multi)  Rate controlled continue to monitor           I spent 25 minutes in the professional and overall care of this patient.      KALEB Correa MD

## 2024-08-25 NOTE — CONSULTS
Department of Medicine  Division of Pulmonary, Critical Care, and Sleep Medicine    Reason For Consult  I was asked by Dr. Anthony to evaluate . Milton Lane for acute respiratory failure with hypoxemia.    History Of Present Illness  Milton Lane is a 74 y.o. male with past medical history of nicotine dependence (quit 2 months ago), coronary artery disease, chronic systolic and diastolic heart failure, chronic atrial fibrillation on anticoagulation, COPD with chronic hypoxemic respiratory failure at 4 L/min of supplemental oxygen at baseline and mild aortic stenosis who presented to the hospital with acute hypoxemia and worsening shortness of breath with minimal exertions.  CT scan of chest revealed interlobular septal thickening and bilateral pleural effusions.  He has been managed for acute decompensated heart failure and reports to feel better since admission.    Review of system is negative for acute fevers or chills.  He has chronic cough with clear sputum production that has not changed lately.  Denies acute chest pain.       Past Medical History:  Past Medical History:   Diagnosis Date    Bipolar 1 disorder (Multi)     Chronic systolic heart failure (Multi) 12/15/2023    COPD (chronic obstructive pulmonary disease) (Multi)     Coronary artery disease involving native coronary artery of native heart without angina pectoris 12/15/2023    Hypertension     Iron deficiency anemia     Nicotine dependence, uncomplicated 12/15/2023    Nonrheumatic aortic valve stenosis 12/15/2023    Obese     PAF (paroxysmal atrial fibrillation) (Multi)     PTSD (post-traumatic stress disorder)        Surgical History:  History reviewed. No pertinent surgical history.    Social History:   Social History     Tobacco Use    Smoking status: Every Day     Types: Cigarettes    Smokeless tobacco: Never    Tobacco comments:     Smokes 6 cigarettes per day.   Substance Use Topics    Alcohol use: Yes     Comment: 2 cases of beer per  "week.    Drug use: Never       Family History:  No family history on file.         Vital Signs  Visit Vitals  /57 (BP Location: Left arm, Patient Position: Sitting)   Pulse 82   Temp 36.5 °C (97.7 °F) (Temporal)   Resp 26   Ht 1.753 m (5' 9\")   Wt 98.1 kg (216 lb 4.3 oz)   SpO2 92%   BMI 31.94 kg/m²   Smoking Status Every Day   BSA 2.19 m²        Physical Exam  Vitals and nursing note reviewed.   Constitutional:       General: No in acute distress.     Appearance: Overweight. Chronically ill appearing  HENT:      Head: Normocephalic and atraumatic.   Eyes:      Conjunctiva/sclera: Conjunctivae normal.   Cardiovascular:      Rate and Rhythm: Normal rate. 2+ edema in lower extremities up to knee level.   Pulmonary:      Effort: Pulmonary effort is normal. No respiratory distress.      Breath sounds: diminished breath sounds. Mild expiratory wheezes. Prolonged exhalation.   Skin:     General: Skin is warm and dry.      Coloration: Skin is not jaundiced.   Neurological:      General: No focal deficit present.      Mental Status: Alert and oriented to person, place, and time. Mental status is at baseline.   Psychiatric:         Mood and Affect: Mood normal.         Behavior: Behavior normal.         Judgment: Judgment normal.       Oxygen Therapy  SpO2: 92 %  Medical Gas Therapy: Supplemental oxygen  Medical Gas Delivery Method: High flow nasal cannula  FiO2 (%):  (10 L)      Medications   Scheduled medications  amiodarone, 200 mg, oral, Daily  aspirin, 81 mg, oral, Daily  atorvastatin, 20 mg, oral, Nightly  budesonide, 0.5 mg, nebulization, BID  cefTRIAXone, 1 g, intravenous, q24h  cholecalciferol, 1,000 Units, oral, Daily  empagliflozin, 12.5 mg, oral, Daily  fluticasone, 2 spray, Each Nostril, Daily  formoterol, 20 mcg, nebulization, BID  heparin (porcine), 5,000 Units, subcutaneous, q8h  isosorbide mononitrate ER, 30 mg, oral, Daily  lamoTRIgine, 100 mg, oral, Daily  magnesium oxide, 400 mg, oral, " Daily  melatonin, 3 mg, oral, Nightly  metoprolol succinate XL, 25 mg, oral, Daily  nicotine, 1 patch, transdermal, q24h  torsemide, 40 mg, oral, BID  traZODone, 50 mg, oral, Nightly      Continuous medications     PRN medications  PRN medications: acetaminophen, albuterol, lubricating eye drops, oxygen     Allergies  Flu vaccine 2011-12(3 yr+)(pf) and Losartan      Lab Results     Lab Results   Component Value Date    WBC 7.9 08/25/2024    HGB 9.3 (L) 08/25/2024    HCT 30.7 (L) 08/25/2024    MCV 97 08/25/2024     08/25/2024      Lab Results   Component Value Date    GLUCOSE 102 (H) 08/25/2024    CALCIUM 8.7 08/25/2024     (L) 08/25/2024    K 4.5 08/25/2024    CO2 38 (H) 08/25/2024    CL 83 (L) 08/25/2024    BUN 31 (H) 08/25/2024    CREATININE 0.93 08/25/2024      Lab Results   Component Value Date    ALT 32 08/21/2024    AST 29 08/21/2024    ALKPHOS 136 08/21/2024    BILITOT 1.1 08/21/2024        Chest Radiograph   CT angio chest for pulmonary embolism 08/22/2024    Narrative  STUDY:  CT Angiogram of the Chest; 08/22/2024, 1:39 AM  INDICATION:  Shortness of breath, hypoxia, evaluate for a PE.  COMPARISON:  CTA chest 01/08/2023.  ACCESSION NUMBER(S):  [id number]  ORDERING CLINICIAN:  VINEET JOHNSON  TECHNIQUE:  CTA of the chest was performed with intravenous contrast.  Images are reviewed and processed at a workstation according to the CT  angiogram protocol with 3-D and/or MIP post processing imaging  generated.  Omnipaque 350, 75 mL was administered intravenously.  Automated mA/kV exposure control was utilized and patient examination  was performed in strict accordance with principles of ALARA.  FINDINGS:  Pulmonary arteries are adequately opacified without acute or chronic  filling defects.  The thoracic aorta is normal in course and caliber  without dissection or aneurysm.  There is moderate cardiomegaly.  There is borderline enlargement of  the mediastinal lymph nodes most likely due to  congestion.  There is no pleural effusion, pleural thickening, or pneumothorax.  The airways are patent.  Small left and moderate right pleural effusion.  There is compressive atelectasis in the right middle lobe and right  lower lobe.  There is diffuse interstitial thickening both lungs  suggesting pulmonary edema.  Upper abdomen demonstrates no acute pathology.  There are no acute fractures.  No suspicious bony lesions.    Impression  1.No evidence of pulmonary embolism.  2.Findings most consistent with congestive heart failure.  Signed by Tyrone Ruiz MD      XR chest 1 view 08/21/2024    Narrative  Interpreted By:  Bryon Ruff,  STUDY:  XR CHEST 1 VIEW;  8/21/2024 11:22 pm    INDICATION:  Signs/Symptoms:SOB.    COMPARISON:  CXR 08/07/2024    ACCESSION NUMBER(S):  [id number]    ORDERING CLINICIAN:  VINEET JOHNSON    FINDINGS:      CARDIOMEDIASTINAL SILHOUETTE:  Cardiomediastinal silhouette is stable in size and configuration.    LUNGS:  Bilateral small pleural effusions. Bibasilar consolidations, similar  to prior. Background of pulmonary edema.    ABDOMEN:  No remarkable upper abdominal findings.    BONES:  No acute osseous abnormality.    Impression  Findings are similar to 08/07/2024. There is background of pulmonary  edema and bilateral small pleural effusions with adjacent lower lobe  consolidations which may reflect compressive atelectasis but  superimposed infection or aspiration is difficult to exclude.    MACRO:  None    Signed by: Bryon Ruff 8/22/2024 12:18 AM  Dictation workstation:   [id number]       Assessment and Plan / Recommendations   Assessment/Plan     Acute on chronic respiratory failure with hypoxemia  Acute on chronic systolic and diastolic heart failure  COPD -with severe emphysema on CT scan  Nicotine dependence in remission - quit 2 months ago  Right hemidiaphragm elevation - chronic (similar to CT in 01/2023)  Chronic atelectasis and RML / RLL secondary to #5  Chronic  atrial fibrillation  Mild aortic stenosis  Deconditioning    I reviewed his current CT scan of chest and compared with CT scan back in January 2023.  Agree with radiology report that current CT suggest fluid overload with interlobular septal thickening and bilateral pleural effusions.  The patient does not have clinical symptoms of acute respiratory infection right now.    Plan:  Agree with diuresis per cardiology plan.  Will continue on current inhaler regimen.   Titrate nasal cannula flow rate for O2 saturation above 90%.  He is at 4 L/min at baseline.  Encouraged to use incentive spirometry frequently at rest for right middle lobe/right lower lobe atelectasis.    Will follow up his hospital stay.     Paul Anderson MD  Please excuse any misspellings or unintended errors related to the Dragon speech recognition software used to dictate this note.

## 2024-08-25 NOTE — NURSING NOTE
THE PT LOOKS LIKE HE'S BREATHING SO MUCH EASIER TODAY AND NOT DISTRESSED  AT REST. NOT USING HIS ABD ACCESSORY MUSCLES. MAINTAINING A PULSE OX > 92% ON 10 L HIGH FLOW NASAL CANNULA/OXIMIZER  WHEN AT REST.  HE DOES NOT  DROP HIS O2 SATS < 85% WITH EXERTION IE GETTING UP TO THE BSC.  THE PT STATED HE FEELS LIKE HE'S BREATHING BETTER. UP X1 ASSIST FOR THE BSC. HAD A VERY LG BM. WAS CONTINENT. CONTINUES TO ASK FOR FOOD AND DRINK AS HIS PRIORITY. HE DOES NOT LIKE TO BE TOLD WHAT HE NEEDS TO DO. DOES NOT WONT TO LISTEN TO ANY EDUCATION. IGNORES THE EXAMINER. THE PT DENIES ANY CP, SOB, PALPITATIONS, DIZZINESS OR NAUSEA.  REMAINS TACHYPNEAC. SPEECH IS MORE NORMAL. THE PT FOLLOWS COMMANDS. C/O COARSE RHONH AUSCULTATED. THE PT HAS A PROD COUGH- SM AMT OF THICK YELLOW SECRETIONS. THE PTS EKG CONTINUES TO BE AFIB WITH A CONTROLLED RATE. BP- WNL; TEMP IS AFEBRILE. HIS BLE EDEMSA IS MARKEDLY LESSENED. NOW APPEARS TO BE A MITZI RED WITH +2 EDEMA AT BEST. PULSES ARE PALPABLE. THE PT STATES NEUROPATHY IN BILAT FEET. THE PTS ABD IS OBESE, NON TENDER. BS +.   HE IS INCONTINENT OF [place] YELLOW URINE. THE EXT. PURWICK CATHETER WAS LEAKING AND DCD. A COMPLETE PM BATH WAS GIVEN AND LINENS WERE CHANGED. A NEW PURWICK EXT CATHETER WAS PLACED. THE PT CONTINUES TO HAVE SCROTAL AND PENILE EDEMA. LUDMILA;AT GROINS AND INNER THIGHS ARE PURPLE RED,  EXCORIATED , AND TENDER.  ZINC OXIDE CREAM WAS APPLIED AFTER HYGENIC MEASURES WERE GIVEN. . THE PT IS STILL VOICING FATIGUE.  APPEARS PALE. CHF EDUCATION WAS ATTEMPTED WITH LITTLE RESULTS. ENC. NOT TO GET UP UNATTENDED.   0400 THE PT SLEPT FOR A COUPLE OF HRS. WAS NOT LABORED AS PREVIOUSLY OBSERVED AND ON PRIOR NIGHTS. HOWEVER HE DID WAKE UP EXTREMELY CONGESTED.  SECRETIONS ARE LOOSE.  THE PT IS NOT WANTING TO WORK ON COUGHING THEM UP AT THIS TIME.  BREATHING APPEARS A LITTLE MORE LABORED AND THE PT SOB. O2 SATS AREN QUICK TO RECOOP BACK UP IN THE 90'S AFTER DROPPING DOWN TO 85. DECLINED AN AM WT STATING  HE GETS TOO SOB WHEN HE LIES IN THE BED. THE PT HAD DISLODGED HIS PURWICK CATHETER TUBING AND WAS AGAIN INCONTINENT OF A LG AMT OF URINE. THE PT DOES NOT SEEM TO CARE ABOUT TAKING CARE OF HIMSELF SO THAT HIS TUBINGS DONT GET DISLODGED OR KINKED OR THAT HE DOESN'T SIT IN WETTNESS. HYGENIC MEASURES  WERE REPEATED. THE PT CONTINUES TO ASK FOR FOOD AND DRINK.   STILL ORIENTED X4.   0515- AM LABS OBTAINED. THE PTR IS REFUSING TO GET A WT  THIS AM . STATED HE CAN'T BREATH WHEN IN BED.    0600- THE PT IS ASLEEP AT THIS TIME. CAT NAPS OFF AND ON. REMAINS IN THE RECLINER CHAIR ALL NIGHT. RONI STOOD TO STRETCH WITH HIS WALKER. HE APPEARS A LOT STRONGER THAN 2 DAYS AGO. PT SAFETY WAS MAINTAINED.

## 2024-08-25 NOTE — PROGRESS NOTES
Cardiology Progress Note           Rounding Cardiologist:  Dr. Denisse Payne  Primary Cardiologist:        Date:  8/25/2024  Patient:  Milton Lane  YOB: 1950  MRN:  [id number]   Admit Date:  8/21/2024      SUBJECTIVE    Milton Lane was seen and examined today at bedside.     Patient not doing as well today. Continues to spend all his time sitting up in the chair with legs dangling. SCD not in place today but has knee high support stockings on. Still choking on oral secretions at times with his cough.     No chest pain, dizziness or lightheadedness.     Review of Systems     VITALS     Vitals:    08/25/24 0500 08/25/24 0815 08/25/24 0858 08/25/24 1200   BP: 124/67 112/60  129/70   BP Location: Right arm Left arm  Left arm   Patient Position: Sitting Lying  Sitting   Pulse:  90  84   Resp:  24  (!) 32   Temp: 36.8 °C (98.2 °F) 36.6 °C (97.9 °F)  36.6 °C (97.9 °F)   TempSrc: Temporal Temporal  Temporal   SpO2:  95% 92% 95%   Weight:       Height:           Intake/Output Summary (Last 24 hours) at 8/25/2024 1538  Last data filed at 8/25/2024 1301  Gross per 24 hour   Intake 850 ml   Output 2200 ml   Net -1350 ml       Vitals:    08/22/24 0916   Weight: 98.1 kg (216 lb 4.3 oz)       CURRENT MEDICATIONS    amiodarone, 200 mg, oral, Daily  aspirin, 81 mg, oral, Daily  atorvastatin, 20 mg, oral, Nightly  budesonide, 0.5 mg, nebulization, BID  cholecalciferol, 1,000 Units, oral, Daily  empagliflozin, 12.5 mg, oral, Daily  fluticasone, 2 spray, Each Nostril, Daily  formoterol, 20 mcg, nebulization, BID  heparin (porcine), 5,000 Units, subcutaneous, q8h  isosorbide mononitrate ER, 30 mg, oral, Daily  lamoTRIgine, 100 mg, oral, Daily  magnesium oxide, 400 mg, oral, Daily  melatonin, 3 mg, oral, Nightly  metoprolol succinate XL, 25 mg, oral, Daily  nicotine, 1 patch, transdermal, q24h  torsemide, 40 mg, oral, BID  traZODone, 50 mg, oral, Nightly         Current Outpatient Medications   Medication  Instructions    acetaminophen (TYLENOL) 650 mg, oral, Every 4 hours PRN    albuterol (Ventolin HFA) 90 mcg/actuation inhaler 2 puffs, inhalation, Every 6 hours PRN    albuterol 2.5 mg, nebulization, Every 4 hours PRN    amiodarone (PACERONE) 200 mg, oral, Daily    aspirin 81 mg, oral, Daily    atorvastatin (LIPITOR) 20 mg, oral, Daily    bumetanide (BUMEX) 2 mg, oral, 2 times daily (morning and late afternoon)    cholecalciferol (VITAMIN D-3) 1,000 Units, oral, Daily    empagliflozin (JARDIANCE) 12.5 mg, oral, Daily    fluticasone (Flonase) 50 mcg/actuation nasal spray 2 sprays, Each Nostril, Daily, Shake gently. Before first use, prime pump. After use, clean tip and replace cap.    fluticasone propion-salmeteroL (Advair Diskus) 250-50 mcg/dose diskus inhaler 1 puff, inhalation, 2 times daily RT    isosorbide mononitrate ER (IMDUR) 30 mg, oral, Daily, Do not crush or chew.    lamoTRIgine (LAMICTAL) 100 mg, oral, Daily    lubricating eye drops ophthalmic solution 1 drop, Both Eyes, Every 4 hours PRN    magnesium oxide (MAG-OX) 400 mg, oral, Daily    melatonin 3 mg, oral, Nightly    metoprolol succinate XL (TOPROL-XL) 25 mg, oral, Daily, Do not crush or chew.    midodrine (PROAMATINE) 5 mg, oral, 3 times daily, Hold for sbp>120    nicotine (Nicoderm CQ) 7 mg/24 hr patch 1 patch, transdermal, Every 24 hours    nicotine polacrilex (COMMIT) 2 mg, Mouth/Throat, As needed    oxygen (O2) gas therapy 1 each, inhalation, Continuous    traZODone (DESYREL) 50 mg, oral, Nightly        PHYSICAL EXAMINATION   GENERAL:  Well developed, well nourished, in no acute distress.  CHEST:  Symmetric and nontender.  NECK:  mild JVD at 90 degrees  LUNGS:  Clear to auscultation bilaterally, normal respiratory effort. Prolonged exp phase.   HEART:  Irregularly irregular, no S3, no change in murmur  ABDOMEN: Soft, NT, ND without palpable organomegaly, normoactive bowel sounds.   EXTREMITIES:  Warm with good color, no clubbing or cyanosis.   "There is 2+ edema noted.      LAB DATA     CBC:   Results from last 7 days   Lab Units 08/25/24  0502   WBC AUTO x10*3/uL 7.9   RBC AUTO x10*6/uL 3.16*   HEMOGLOBIN g/dL 9.3*   HEMATOCRIT % 30.7*   PLATELETS AUTO x10*3/uL 240        CMP:    Results from last 7 days   Lab Units 08/25/24  0502   SODIUM mmol/L 129*   POTASSIUM mmol/L 4.5   CHLORIDE mmol/L 83*   CO2 mmol/L 38*   BUN mg/dL 31*   CREATININE mg/dL 0.93   GLUCOSE mg/dL 102*   CALCIUM mg/dL 8.7       Cardiac Enzymes:    Lab Results   Component Value Date    TROPHS 26 (H) 08/22/2024    TROPHS 25 (H) 08/21/2024    TROPHS 132 (HH) 08/07/2024       Magnesium:    Lab Results   Component Value Date    MG 1.80 08/25/2024       Lipid Profile:  No results found for: \"CHLPL\", \"TRIG\", \"HDL\", \"LDLCALC\", \"LDLDIRECT\"    TSH:  No results found for: \"TSH\"    BNP:   Lab Results   Component Value Date     (H) 08/25/2024        PT/INR:  No results found for: \"PROTIME\", \"INR\"    HgBA1c:    Lab Results   Component Value Date    HGBA1C 5.5 01/08/2023       CBC:  Lab Results   Component Value Date    WBC 7.9 08/25/2024    WBC 8.6 08/24/2024    WBC 8.9 08/23/2024    RBC 3.16 (L) 08/25/2024    RBC 3.27 (L) 08/24/2024    RBC 3.49 (L) 08/23/2024    HGB 9.3 (L) 08/25/2024    HGB 9.8 (L) 08/24/2024    HGB 10.4 (L) 08/23/2024    HCT 30.7 (L) 08/25/2024    HCT 32.3 (L) 08/24/2024    HCT 34.7 (L) 08/23/2024    MCV 97 08/25/2024    MCV 99 08/24/2024    MCV 99 08/23/2024    MCH 29.4 08/25/2024    MCH 30.0 08/24/2024    MCH 29.8 08/23/2024    MCHC 30.3 (L) 08/25/2024    MCHC 30.3 (L) 08/24/2024    MCHC 30.0 (L) 08/23/2024    RDW 16.7 (H) 08/25/2024    RDW 16.8 (H) 08/24/2024    RDW 17.1 (H) 08/23/2024     08/25/2024     08/24/2024     08/23/2024       BMP:  Lab Results   Component Value Date     (L) 08/25/2024     (L) 08/24/2024     (L) 08/23/2024    K 4.5 08/25/2024    K 4.3 08/24/2024    K 3.7 08/23/2024    CL 83 (L) 08/25/2024    CL 85 (L) " "08/24/2024    CL 86 (L) 08/23/2024    CO2 38 (H) 08/25/2024    CO2 39 (H) 08/24/2024    CO2 37 (H) 08/23/2024    BUN 31 (H) 08/25/2024    BUN 27 (H) 08/24/2024    BUN 23 08/23/2024    CREATININE 0.93 08/25/2024    CREATININE 0.87 08/24/2024    CREATININE 0.80 08/23/2024       Hepatic Function Panel:  No results found for: \"ALKPHOS\", \"ALT\", \"AST\", \"PROT\", \"BILITOT\", \"BILIDIR\"      DIAGNOSTIC TESTING RESULTS     ECG 12 lead    Result Date: 8/22/2024  Atrial fibrillation with premature ventricular or aberrantly conducted complexes Rightward axis Possible Inferior infarct , age undetermined Abnormal ECG When compared with ECG of 07-AUG-2024 13:19, Previous ECG has undetermined rhythm, needs review Borderline criteria for Inferior infarct are now Present Inverted T waves have replaced nonspecific T wave abnormality in Inferior leads Nonspecific T wave abnormality now evident in Lateral leads QT has shortened    CT angio chest for pulmonary embolism    Result Date: 8/22/2024  STUDY: CT Angiogram of the Chest; 08/22/2024, 1:39 AM INDICATION: Shortness of breath, hypoxia, evaluate for a PE. COMPARISON: CTA chest 01/08/2023. ACCESSION NUMBER(S): [id number] ORDERING CLINICIAN: VINEET JOHNSON TECHNIQUE:  CTA of the chest was performed with intravenous contrast. Images are reviewed and processed at a workstation according to the CT angiogram protocol with 3-D and/or MIP post processing imaging generated.  Omnipaque 350, 75 mL was administered intravenously. Automated mA/kV exposure control was utilized and patient examination was performed in strict accordance with principles of ALARA. FINDINGS: Pulmonary arteries are adequately opacified without acute or chronic filling defects.  The thoracic aorta is normal in course and caliber without dissection or aneurysm. There is moderate cardiomegaly.  There is borderline enlargement of the mediastinal lymph nodes most likely due to congestion. There is no pleural effusion, " pleural thickening, or pneumothorax. The airways are patent. Small left and moderate right pleural effusion. There is compressive atelectasis in the right middle lobe and right lower lobe.  There is diffuse interstitial thickening both lungs suggesting pulmonary edema. Upper abdomen demonstrates no acute pathology. There are no acute fractures.  No suspicious bony lesions.    1.No evidence of pulmonary embolism. 2.Findings most consistent with congestive heart failure. Signed by Tyrone Ruiz MD    XR chest 1 view    Result Date: 8/22/2024  Interpreted By:  Bryon Ruff, STUDY: XR CHEST 1 VIEW;  8/21/2024 11:22 pm   INDICATION: Signs/Symptoms:SOB.   COMPARISON: CXR 08/07/2024   ACCESSION NUMBER(S): [id number]   ORDERING CLINICIAN: VINEET JOHNSON   FINDINGS:     CARDIOMEDIASTINAL SILHOUETTE: Cardiomediastinal silhouette is stable in size and configuration.   LUNGS: Bilateral small pleural effusions. Bibasilar consolidations, similar to prior. Background of pulmonary edema.   ABDOMEN: No remarkable upper abdominal findings.   BONES: No acute osseous abnormality.       Findings are similar to 08/07/2024. There is background of pulmonary edema and bilateral small pleural effusions with adjacent lower lobe consolidations which may reflect compressive atelectasis but superimposed infection or aspiration is difficult to exclude.   MACRO: None   Signed by: Bryon Ruff 8/22/2024 12:18 AM Dictation workstation:   [id number]         RADIOLOGY     XR chest 1 view   Final Result   There is worsening pulmonary edema with increased size of bilateral   pleural effusions.        Signed by: Frances Harris 8/25/2024 12:33 PM   Dictation workstation:   [id number]      CT angio chest for pulmonary embolism   Final Result   1.No evidence of pulmonary embolism.   2.Findings most consistent with congestive heart failure.   Signed by Tyrone Ruiz MD      XR chest 1 view   Final Result   Findings are similar to 08/07/2024. There  is background of pulmonary   edema and bilateral small pleural effusions with adjacent lower lobe   consolidations which may reflect compressive atelectasis but   superimposed infection or aspiration is difficult to exclude.        MACRO:   None        Signed by: Bryon Ruff 8/22/2024 12:18 AM   Dictation workstation:   [id number]      Consult to Interventional Radiology    (Results Pending)   US thoracentesis    (Results Pending)         ASSESSMENT     Problem List Items Addressed This Visit       Acute on chronic congestive heart failure, unspecified heart failure type (Multi) - Primary    Relevant Medications    midodrine (Proamatine) 5 mg tablet    isosorbide mononitrate ER (Imdur) 24 hr tablet 30 mg    metoprolol succinate XL (Toprol-XL) 24 hr tablet 25 mg     Other Visit Diagnoses       Hypoxia        Pleural effusion                Patient Active Problem List   Diagnosis    Atherosclerosis of native coronary artery of native heart without angina pectoris    Chronic systolic heart failure (Multi)    Nicotine dependence, uncomplicated    Nonrheumatic aortic valve stenosis    Shortness of breath    Acute on chronic combined systolic and diastolic CHF, NYHA class 4 (Multi)    Hyponatremia    Hypervolemia    Acute on chronic hypoxic respiratory failure (Multi)    COPD (chronic obstructive pulmonary disease) (Multi)    Abnormal urinalysis    Acute on chronic congestive heart failure, unspecified heart failure type (Multi)    Acute cystitis without hematuria    Longstanding persistent atrial fibrillation (Multi)       PLAN     Diuresing slowly at about 1-1.5 liters daily  Will try changing to torsemide 100 mg daily tomorrow (getting 40 bid or 80 mg daily currently). He is heading in the right direction so could also DC with expectations that he diureses 500 ml/daily as outpatient? Will see where BNP is tomorrow. Patient informed of his poor prognosis. Continue pulmonary Rx.     Please do not hesitate to call  with questions.  Electronically signed by Denisse Payne MD, on 8/25/2024 at 3:38 PM

## 2024-08-26 VITALS
SYSTOLIC BLOOD PRESSURE: 118 MMHG | OXYGEN SATURATION: 98 % | DIASTOLIC BLOOD PRESSURE: 66 MMHG | TEMPERATURE: 97.7 F | BODY MASS INDEX: 32.03 KG/M2 | HEIGHT: 69 IN | WEIGHT: 216.27 LBS

## 2024-08-26 LAB
ALBUMIN SERPL BCP-MCNC: 3.7 G/DL (ref 3.4–5)
ALP SERPL-CCNC: 178 U/L (ref 33–136)
ALT SERPL W P-5'-P-CCNC: 33 U/L (ref 10–52)
ANION GAP SERPL CALC-SCNC: 13 MMOL/L (ref 10–20)
AST SERPL W P-5'-P-CCNC: 32 U/L (ref 9–39)
BILIRUB SERPL-MCNC: 0.7 MG/DL (ref 0–1.2)
BUN SERPL-MCNC: 32 MG/DL (ref 6–23)
CALCIUM SERPL-MCNC: 8.6 MG/DL (ref 8.6–10.3)
CHLORIDE SERPL-SCNC: 84 MMOL/L (ref 98–107)
CO2 SERPL-SCNC: 38 MMOL/L (ref 21–32)
CREAT SERPL-MCNC: 0.82 MG/DL (ref 0.5–1.3)
EGFRCR SERPLBLD CKD-EPI 2021: >90 ML/MIN/1.73M*2
ERYTHROCYTE [DISTWIDTH] IN BLOOD BY AUTOMATED COUNT: 16.5 % (ref 11.5–14.5)
GLUCOSE SERPL-MCNC: 130 MG/DL (ref 74–99)
HCT VFR BLD AUTO: 30.7 % (ref 41–52)
HGB BLD-MCNC: 9.1 G/DL (ref 13.5–17.5)
LDH SERPL L TO P-CCNC: 142 U/L (ref 84–246)
MAGNESIUM SERPL-MCNC: 2.03 MG/DL (ref 1.6–2.4)
MCH RBC QN AUTO: 29.1 PG (ref 26–34)
MCHC RBC AUTO-ENTMCNC: 29.6 G/DL (ref 32–36)
MCV RBC AUTO: 98 FL (ref 80–100)
NRBC BLD-RTO: 0 /100 WBCS (ref 0–0)
PLATELET # BLD AUTO: 254 X10*3/UL (ref 150–450)
POTASSIUM SERPL-SCNC: 4.4 MMOL/L (ref 3.5–5.3)
PROT SERPL-MCNC: 7.3 G/DL (ref 6.4–8.2)
RBC # BLD AUTO: 3.13 X10*6/UL (ref 4.5–5.9)
SODIUM SERPL-SCNC: 131 MMOL/L (ref 136–145)
WBC # BLD AUTO: 8.5 X10*3/UL (ref 4.4–11.3)

## 2024-08-26 PROCEDURE — 2500000001 HC RX 250 WO HCPCS SELF ADMINISTERED DRUGS (ALT 637 FOR MEDICARE OP): Performed by: NURSE PRACTITIONER

## 2024-08-26 PROCEDURE — 2500000005 HC RX 250 GENERAL PHARMACY W/O HCPCS: Performed by: NURSE PRACTITIONER

## 2024-08-26 PROCEDURE — 2500000004 HC RX 250 GENERAL PHARMACY W/ HCPCS (ALT 636 FOR OP/ED): Performed by: NURSE PRACTITIONER

## 2024-08-26 PROCEDURE — 97535 SELF CARE MNGMENT TRAINING: CPT | Mod: GO,CO

## 2024-08-26 PROCEDURE — 2500000002 HC RX 250 W HCPCS SELF ADMINISTERED DRUGS (ALT 637 FOR MEDICARE OP, ALT 636 FOR OP/ED): Performed by: NURSE PRACTITIONER

## 2024-08-26 PROCEDURE — 2500000005 HC RX 250 GENERAL PHARMACY W/O HCPCS: Performed by: INTERNAL MEDICINE

## 2024-08-26 PROCEDURE — 94640 AIRWAY INHALATION TREATMENT: CPT

## 2024-08-26 PROCEDURE — 36415 COLL VENOUS BLD VENIPUNCTURE: CPT | Performed by: NURSE PRACTITIONER

## 2024-08-26 PROCEDURE — 94660 CPAP INITIATION&MGMT: CPT

## 2024-08-26 RX ORDER — TORSEMIDE 20 MG/1
20 TABLET ORAL ONCE
Status: COMPLETED | OUTPATIENT
Start: 2024-08-26 | End: 2024-08-26

## 2024-08-26 RX ORDER — TORSEMIDE 100 MG/1
100 TABLET ORAL DAILY
Status: DISCONTINUED | OUTPATIENT
Start: 2024-08-26 | End: 2024-08-26 | Stop reason: HOSPADM

## 2024-08-26 ASSESSMENT — PAIN SCALES - GENERAL
PAINLEVEL_OUTOF10: 0 - NO PAIN

## 2024-08-26 ASSESSMENT — COGNITIVE AND FUNCTIONAL STATUS - GENERAL
HELP NEEDED FOR BATHING: A LOT
MOVING TO AND FROM BED TO CHAIR: A LOT
PERSONAL GROOMING: A LITTLE
WALKING IN HOSPITAL ROOM: A LOT
DRESSING REGULAR LOWER BODY CLOTHING: TOTAL
MOVING FROM LYING ON BACK TO SITTING ON SIDE OF FLAT BED WITH BEDRAILS: A LITTLE
PERSONAL GROOMING: A LITTLE
TOILETING: A LOT
EATING MEALS: A LITTLE
HELP NEEDED FOR BATHING: A LOT
TURNING FROM BACK TO SIDE WHILE IN FLAT BAD: A LITTLE
MOBILITY SCORE: 15
TOILETING: A LOT
DAILY ACTIVITIY SCORE: 14
EATING MEALS: A LITTLE
DAILY ACTIVITIY SCORE: 15
STANDING UP FROM CHAIR USING ARMS: A LITTLE
DRESSING REGULAR UPPER BODY CLOTHING: A LITTLE
CLIMB 3 TO 5 STEPS WITH RAILING: A LOT
DRESSING REGULAR LOWER BODY CLOTHING: A LOT
DRESSING REGULAR UPPER BODY CLOTHING: A LITTLE

## 2024-08-26 ASSESSMENT — PAIN - FUNCTIONAL ASSESSMENT
PAIN_FUNCTIONAL_ASSESSMENT: 0-10
PAIN_FUNCTIONAL_ASSESSMENT: WONG-BAKER FACES
PAIN_FUNCTIONAL_ASSESSMENT: 0-10

## 2024-08-26 ASSESSMENT — ACTIVITIES OF DAILY LIVING (ADL): HOME_MANAGEMENT_TIME_ENTRY: 25

## 2024-08-26 ASSESSMENT — PAIN SCALES - WONG BAKER: WONGBAKER_NUMERICALRESPONSE: HURTS LITTLE BIT

## 2024-08-26 NOTE — PROGRESS NOTES
Occupational Therapy    OT Treatment    Patient Name: Milton Lane  MRN: [id number]  Today's Date: 8/26/2024  Time Calculation  Start Time: 0905  Stop Time: 0930  Time Calculation (min): 25 min       2104/2104-A    Assessment:  End of Session Communication: Bedside nurse  End of Session Patient Position: Up in chair, Alarm on       Plan:  OT Frequency: 3 times per week  OT Discharge Recommendations: Moderate intensity level of continued care     Subjective        08/26/24 0905   OT Last Visit   OT Received On 08/26/24   General   Prior to Session Communication Bedside nurse   Patient Position Received Up in chair;Alarm on   Preferred Learning Style verbal;visual   General Comment Pt agreeable to light activity in chair.   Pain Assessment   Pain Assessment 0-10   0-10 (Numeric) Pain Score 0 - No pain   Grooming   Grooming Level of Assistance Minimum assistance   Grooming Where Assessed Chair   Grooming Comments cues for initiation to tasks   LE Bathing   LE Bathing Comments eduacted on AD/AE   UE Dressing   UE Dressing Level of Assistance Minimum assistance   UE Dressing Where Assessed Chair   UE Dressing Comments doff dirty and don clean gown   IP OT Assessment   End of Session Communication Bedside nurse   End of Session Patient Position Up in chair;Alarm on   Inpatient Plan   OT Frequency 3 times per week   OT Discharge Recommendations Moderate intensity level of continued care       Outcome Measures:[facility] Daily Activity  Putting on and taking off regular lower body clothing: Total  Bathing (including washing, rinsing, drying): A lot  Putting on and taking off regular upper body clothing: A little  Toileting, which includes using toilet, bedpan or urinal: A lot  Taking care of personal grooming such as brushing teeth: A little  Eating Meals: A little  Daily Activity - Total Score: 14  Education Documentation  ADL Training, taught by ASHLEY Amos at 8/26/2024  2:33 PM.  Learner: Patient  Readiness:  Acceptance  Method: Explanation, Demonstration  Response: Verbalizes Understanding, Demonstrated Understanding, Needs Reinforcement    Body Mechanics, taught by ASHLEY Amos at 8/26/2024  2:33 PM.  Learner: Patient  Readiness: Acceptance  Method: Explanation, Demonstration  Response: Verbalizes Understanding, Demonstrated Understanding, Needs Reinforcement    Precautions, taught by ASHLEY Amos at 8/26/2024  2:33 PM.  Learner: Patient  Readiness: Acceptance  Method: Explanation, Demonstration  Response: Verbalizes Understanding, Demonstrated Understanding, Needs Reinforcement    Education Comments  No comments found.            Goals:  Encounter Problems       Encounter Problems (Active)       Dressing Upper Extremities       STG - Patient will dress upper body with min assist  (Progressing)       Start:  08/22/24    Expected End:  08/30/24               Dressings Lower Extremities       STG - Patient will complete lower body dressing with min assist with comp strategies  (Progressing)       Start:  08/22/24    Expected End:  08/30/24               Functional Balance       STG-Patient will be SBA with assistive device dynamic stand task >5 minutes for ADL completion  (Progressing)       Start:  08/22/24    Expected End:  09/05/24               Functional Mobility       STG-Patient will be SBA with assistive device functional mobility tasks   (Progressing)       Start:  08/22/24    Expected End:  09/05/24               OT Transfers       STG-Patient will be SBA with functional transfers demonstrating good safety   (Progressing)       Start:  08/22/24    Expected End:  09/05/24

## 2024-08-26 NOTE — PROGRESS NOTES
Department of Medicine  Division of Pulmonary, Critical Care, and Sleep Medicine    Milton Lane is a 74 y.o. male on day 4 of admission presenting with Acute on chronic combined systolic and diastolic CHF, NYHA class 4 (Multi).    Subjective   Overnight events were discussed with nursing staff.  Increased O2 requirement up to 15 LPM Oxymizer NC.   The patient was sitting up in a chair. Reports shortness of breath with minimal exertions within the room. Cough is intermittent and is productive of clear sputum. Denies acute chest pain.   Afebrile overnight.        Objective     Vital Signs      8/25/2024    12:30 AM 8/25/2024     2:00 AM 8/25/2024     4:30 AM 8/25/2024     5:00 AM 8/25/2024     8:15 AM 8/25/2024    12:00 PM 8/25/2024     4:00 PM   Vitals   Systolic    124 112 129 112   Diastolic    67 60 70 90   Heart Rate 82 86   90 84 84   Temp 36.7 °C (98.1 °F)   36.8 °C (98.2 °F) 36.6 °C (97.9 °F) 36.6 °C (97.9 °F) 36.6 °C (97.9 °F)   Resp 26 31   24 32 22   Weight (lb)   --            Physical Exam  Vitals and nursing note reviewed.   Constitutional:       General: No in acute distress.     Appearance: Overweight. Chronically ill appearing  HENT:      Head: Normocephalic and atraumatic.   Eyes:      Conjunctiva/sclera: Conjunctivae normal.   Cardiovascular:      Rate and Rhythm: Normal rate. 2+ edema in lower extremities up to knee level.   Pulmonary:      Effort: Pulmonary effort is normal. No respiratory distress.      Breath sounds: diminished breath sounds. Mild expiratory wheezes. Prolonged exhalation.   Skin:     General: Skin is warm and dry.      Coloration: Skin is not jaundiced.   Neurological:      General: No focal deficit present.      Mental Status: Alert and oriented to person, place, and time. Mental status is at baseline.   Psychiatric:         Mood and Affect: Mood normal.         Behavior: Behavior normal.         Judgment: Judgment normal.       Labs:  Lab Results   Component Value Date     WBC 7.9 08/25/2024    HGB 9.3 (L) 08/25/2024    HCT 30.7 (L) 08/25/2024    MCV 97 08/25/2024     08/25/2024      Lab Results   Component Value Date    GLUCOSE 102 (H) 08/25/2024    CALCIUM 8.7 08/25/2024     (L) 08/25/2024    K 4.5 08/25/2024    CO2 38 (H) 08/25/2024    CL 83 (L) 08/25/2024    BUN 31 (H) 08/25/2024    CREATININE 0.93 08/25/2024      Lab Results   Component Value Date    ALT 32 08/21/2024    AST 29 08/21/2024    ALKPHOS 136 08/21/2024    BILITOT 1.1 08/21/2024        Oxygen Therapy  SpO2: 95 %  Medical Gas Therapy: Supplemental oxygen  Medical Gas Delivery Method: High flow nasal cannula       Intake/Output last 3 Shifts:  I/O last 3 completed shifts:  In: 1090 (11.1 mL/kg) [P.O.:1040; I.V.:50 (0.5 mL/kg)]  Out: 3400 (34.7 mL/kg) [Urine:3400 (1 mL/kg/hr)]  Weight: 98.1 kg       Medications   Scheduled medications  amiodarone, 200 mg, oral, Daily  aspirin, 81 mg, oral, Daily  atorvastatin, 20 mg, oral, Nightly  budesonide, 0.5 mg, nebulization, BID  cholecalciferol, 1,000 Units, oral, Daily  empagliflozin, 12.5 mg, oral, Daily  fluticasone, 2 spray, Each Nostril, Daily  formoterol, 20 mcg, nebulization, BID  heparin (porcine), 5,000 Units, subcutaneous, q8h  isosorbide mononitrate ER, 30 mg, oral, Daily  lamoTRIgine, 100 mg, oral, Daily  magnesium oxide, 400 mg, oral, Daily  melatonin, 3 mg, oral, Nightly  metoprolol succinate XL, 25 mg, oral, Daily  nicotine, 1 patch, transdermal, q24h  torsemide, 40 mg, oral, BID  traZODone, 50 mg, oral, Nightly      Continuous medications     PRN medications  PRN medications: acetaminophen, albuterol, lubricating eye drops, oxygen     Allergies  Flu vaccine 2011-12(3 yr+)(pf) and Losartan      Chest Radiograph   CT angio chest for pulmonary embolism 08/22/2024    Narrative  STUDY:  CT Angiogram of the Chest; 08/22/2024, 1:39 AM  INDICATION:  Shortness of breath, hypoxia, evaluate for a PE.  COMPARISON:  CTA chest 01/08/2023.  ACCESSION  NUMBER(S):  [id number]  ORDERING CLINICIAN:  VINEET JOHNSON  TECHNIQUE:  CTA of the chest was performed with intravenous contrast.  Images are reviewed and processed at a workstation according to the CT  angiogram protocol with 3-D and/or MIP post processing imaging  generated.  Omnipaque 350, 75 mL was administered intravenously.  Automated mA/kV exposure control was utilized and patient examination  was performed in strict accordance with principles of ALARA.  FINDINGS:  Pulmonary arteries are adequately opacified without acute or chronic  filling defects.  The thoracic aorta is normal in course and caliber  without dissection or aneurysm.  There is moderate cardiomegaly.  There is borderline enlargement of  the mediastinal lymph nodes most likely due to congestion.  There is no pleural effusion, pleural thickening, or pneumothorax.  The airways are patent.  Small left and moderate right pleural effusion.  There is compressive atelectasis in the right middle lobe and right  lower lobe.  There is diffuse interstitial thickening both lungs  suggesting pulmonary edema.  Upper abdomen demonstrates no acute pathology.  There are no acute fractures.  No suspicious bony lesions.    Impression  1.No evidence of pulmonary embolism.  2.Findings most consistent with congestive heart failure.  Signed by Tyrone Ruiz MD       XR chest 1 view 08/25/2024    Narrative  Interpreted By:  Frances Harris,  STUDY:  XR CHEST 1 VIEW 8/25/2024 10:52 am    INDICATION:  Dyspnea. CHF.    COMPARISON:  08/21/2024    ACCESSION NUMBER(S):  [id number]    ORDERING CLINICIAN:  KERI WHITMAN    TECHNIQUE:  AP erect view of the chest    FINDINGS:  The cardiac size is stable. There are bilateral pleural effusions  seen with the size of these effusions increased compared with  previous examination. Pulmonary vascular congestion with pulmonary  edema appears a little worse as well. There is calcified plaque  within the aortic  knob.    Impression  There is worsening pulmonary edema with increased size of bilateral  pleural effusions.    Signed by: Frances Harris 8/25/2024 12:33 PM  Dictation workstation:   [id number]      XR chest 1 view 08/21/2024    Narrative  Interpreted By:  Bryon Ruff,  STUDY:  XR CHEST 1 VIEW;  8/21/2024 11:22 pm    INDICATION:  Signs/Symptoms:SOB.    COMPARISON:  CXR 08/07/2024    ACCESSION NUMBER(S):  [id number]    ORDERING CLINICIAN:  VINEET JOHNSON    FINDINGS:      CARDIOMEDIASTINAL SILHOUETTE:  Cardiomediastinal silhouette is stable in size and configuration.    LUNGS:  Bilateral small pleural effusions. Bibasilar consolidations, similar  to prior. Background of pulmonary edema.    ABDOMEN:  No remarkable upper abdominal findings.    BONES:  No acute osseous abnormality.    Impression  Findings are similar to 08/07/2024. There is background of pulmonary  edema and bilateral small pleural effusions with adjacent lower lobe  consolidations which may reflect compressive atelectasis but  superimposed infection or aspiration is difficult to exclude.    MACRO:  None    Signed by: Bryon Ruff 8/22/2024 12:18 AM  Dictation workstation:   [id number]         Assessment and Plan / Recommendations   Assessment/Plan     Acute on chronic respiratory failure with hypoxemia  Acute on chronic systolic and diastolic heart failure  COPD -with severe emphysema on CT scan  Nicotine dependence in remission - quit 2 months ago  Right hemidiaphragm elevation - chronic (similar to CT in 01/2023)  Chronic atelectasis and RML / RLL secondary to #5  Chronic atrial fibrillation  Mild aortic stenosis  Deconditioning     I reviewed his current CT scan of chest and compared with CT scan back in January 2023.  Agree with radiology report that current CT suggest fluid overload with interlobular septal thickening and bilateral pleural effusions.      Plan:  Agree with diuresis as per cardiology plan.  IR consult for US exam of R  hemithorax and Thoracentesis - potential benefit to re-expand RLL. Pleural fluid will be sent for routine studies.   Use Incentive spirometry at rest for atelectasis  Continue on current inhaler regimen.   Titrate nasal cannula flow rate for O2 saturation above 90%.     Plan is discussed with primary team.    Overall prognosis is poor.         Paul Anderson MD      Date: 08/26/24 Time: 1:04 AM  Please excuse any misspellings or unintended errors related to the Dragon speech recognition software used to dictate this note.

## 2024-08-26 NOTE — PROGRESS NOTES
Physical Therapy                 Therapy Communication Note    Patient Name: Milton Lane  MRN: [id number]  Today's Date: 8/26/2024     Discipline: Physical Therapy    Missed Visit Reason: Missed Visit Reason: Other (Comment)    Missed Time: Attempt    Comment: Pt not appropriate to be seen this time d/t increased 02 needs and SOB. Pt on bipap at this time. Will continue to see per POC.

## 2024-08-26 NOTE — SIGNIFICANT EVENT
Called to pt's bedside by nursing staff. Pt found to be apneic and pulseless. Exam shows fixed and dilated pupils, no corneal reflex, no audible breath sounds or heart sounds. No carotid pulse was felt. Time of death called at 12:59pm. Family notified and arrangements made.

## 2024-08-26 NOTE — PROGRESS NOTES
"Milton Lane is a 74 y.o. male on day 4 of admission presenting with Acute on chronic combined systolic and diastolic CHF, NYHA class 4 (Multi).    Subjective   Pt is sitting up in chair with HF O2.  He is agitated and grunting. According to his nurse he has been this way all morning. I spoke to him about symptom management through hospice care and he is adamant that he will not make any decisions until his POA is here. I spoke with his POA, Loan [phone] who will be here in about an hour.         Objective         Last Recorded Vitals  Blood pressure 128/64, pulse 92, temperature 36.2 °C (97.2 °F), temperature source Temporal, resp. rate 23, height 1.753 m (5' 9\"), weight 98.1 kg (216 lb 4.3 oz), SpO2 90%.  Intake/Output last 3 Shifts:  I/O last 3 completed shifts:  In: 2240 (22.8 mL/kg) [P.O.:2190; I.V.:50 (0.5 mL/kg)]  Out: 3700 (37.7 mL/kg) [Urine:3700 (1 mL/kg/hr)]  Weight: 98.1 kg     Relevant Results    Pt found in chair unresponsive with heart rate in the 30's. Code [facility] called  and staff arrived but pt . POA arrived support provided and now speaking with Dr. Payne. HOWR services cancelled.         This patient currently has cardiac telemetry ordered; if you would like to modify or discontinue the telemetry order, click here to go to the orders activity to modify/discontinue the order.                 Assessment/Plan   Assessment & Plan  Acute on chronic combined systolic and diastolic CHF, NYHA class 4 (Multi)    Atherosclerosis of native coronary artery of native heart without angina pectoris    Nonrheumatic aortic valve stenosis    Shortness of breath    Acute on chronic hypoxic respiratory failure (Multi)    Acute cystitis without hematuria    Longstanding persistent atrial fibrillation (Multi)    Plan:  Meet at bedside with pts POA and recommend pt sign with hospice. If [facility] bed available will transfer.           Yanira Castellanos RN      "

## 2024-08-26 NOTE — CARE PLAN
The patient's goals for the shift include    The pt will be hds, maintain o2 sats > 92%; remain free from falls and sob; will obtain 6 hrs of sleep; be pleasant and cooperative to staff and demonstrate participation in his care by the end of this shift.

## 2024-08-26 NOTE — NURSING NOTE
THE PT IS AWAKE. SITTING IN THE CHAIR. TALKING TO HIMSELF. ALMOST APPEARS DELIRIOUS. ASKING FOR FOOD AND DRINK. RESPS APPEAR LABORED. HE APPEARS TO CHOKE ON HIS OWN SECRETIONS. C/O A MOIST PROD COUGH. SPUTUM NOTED LIGHT PINK AND BEIGE, TENACIOUS. THE PT IS SPITTING HIS SECRETIONS ON THE FLOOR. COUGHING IN PEOPLES FACES. APPEARS VERY DRAMATIC AT TIMES. LOOKING FOR ATTENTION. WILL TAKE HIS OXIMIZER O2 OFF. PULSE OX DROPS TO 70%. WHEN PLACED BACK ON THE PT WITHIN 5 MIN HE RECOOPS BACK UP TO 93-94%. HE APPEARS VERY TACHYPNEAC. FATIGUED. HAS BEEN IN THE RECLINER CHAIR FOR SEVERAL DAYS. STANDS AT THE CHAIRSIDE AT TIMES. GAIT APPEARS MORE STEADY BUT DOES NOT HAVE THE AIR RESERVOIR TO PERFORM ANY ACTIVITY W/O BEING EXTREMELY SOB. HE IS ON 14 L O2 VIA THE OXIMIZER NASAL CANNULA. HIS BREATH SOUNDS ARE RHONCHOROUS AND WET. VERY DIMINISHED IN THE RLL AND RML. EQUAL CHEST SYMM WITH RESPS OBSERVED. THE PTS EDEMA IN HIS LEGS HAS GONE DOWN ALMOST 50%. YESI HOSE ARE IN PLACE. THE PTS EKG IS AFIB WITH A CONTROLLED RATE. HIS BP HAS BEEN WNL. TEMP IS AFEBRILE. THE PT HAS EDEMA IN HIS ABD AND LOWER HIPS/THIGHS-THE MIDDLLE SECTION. INTERM. DISLODGES HER EXTERNAL PURWICK CATHETER FOR MORE ACCURATE I/O SINCE THE PT IS MOSTLY INCONTINENT. HIS SCROTUM, PENIS AND THIGHS ARE VERY RED, TENDER AND EDEMADEOUS. A NEW PURWICK CATHETER WAS PLACED AFTER A CHG BATH WAS GIVEN AND SKIN BARRIER CREAMS APPLIED. THE PT LIKES THE ATTENTION AND BEDSIDE INTERACTION. THE PT STATED HE'S GETTING STIR CRAZY BEING IN HIS RM FOR SEVERAL DAYS HOWEVER DENIED THE NEED FOR ANY MEDS TO RELIEVE HIS ANXIETY. THE PT HAS DENIED HAVING ANY CP, DIZZINESS OR PALPITATIONS. DENIES HAVING ANY PAIN EXCEPT FPR HIS BUTTOCKS FROM SITTING IN THE CHAIR. HE WAS PLACED ON A PILLOW TO SIT ON AND HAS A MEPILEX ON HIS SACRU,. ZINC OXIDE WAS PLACED ON HIS PERIAREA FOR THE PTS COMFORT. RESP THERAPY WAS CALLED TO THE BS FOR AEROSOL TXS SINCE THE PT APPEARS SO SOB AT TIMES. THE PT STATED HE DOES  FEEL BETTER AFTER THEM AND USU BREATHS WELL ENOUGH TO CLOSE HIS EYES FOR A BRIEF PERIOD OF TIME. THE PT IS TOTALLY DISINTERESTED IN CHF EDUCATION, AFIB, RESP FAILURE. MEDICATION THERAPIES.  DR MCNULTY WAS IN [facility] AND SUGGESTED A POSS THORCOCENTHESIS FOR HIS PLEURAL EFFUSIONS TO OPEN UP THAT RLL. THE PT APPEARS ALITTLE MORE FORGETFUL TONITE AND FATIGUED AND FRUSTRATED. INCREASE SUPPORTS AND COMFORT MEASURES PROVIDED.  0100- THE PT CONTINUES TO BE AWAKE. TALKING TO HIMSELF. BENT OVER IN THE CHAIR TRYING TO CATCH IS BREATH. CONTINUES TO HAVE A NAGGING MOIST PROD COUGH THAT HE HAS TO WORK AT TO EXPECTORATE. RESP THERAPY HAS BEEN CALLED FOR ANOTHER AEROSOL TX. WHEN THE PT IS CALM HIS O2 SATS STAY ABOVE 92% FOR HRS. CAILIN WHEN RESTING. HOWEVER WHEN HE'S AWAKE HE IS CONSTANTLY MOVING, EXERTING HIMSELF, EATING, STANDING NEXT TO THE CHAIR.TAKING HIS O2 OFF ETC. THE PT REQUIRES A LOT OF CLOSE MONITORING TO MAINTAIN HIS SAFETY AND RESP STATUS. VSS.   0530 - THE PT IS AWAKE. MORE CONFUSED. GROWLING. GRUNTING. GOOD URINE OUTPUT NOTED . STILL APPEARS TO BE STRUGGLING TO BREATH WITH HIS CONGESTION. [place] WAS CONSULTED AND  AN EXTRA DOSE OF DEMADEX WAS GIVEN  AND RESP THERAPY  X3 WAS CALLED TO PUT THE PT ON BIPAP. AM LABS WERE DRAWN. THE PTS AL ITTLE MORE IRRITABLE AT THIS TIME. REQUIRING A LOT OF CHAIRSIDE ATTENTION.   REFUSING TO GET A MOMENTARY   WEIGHT IN THE BED. STATED HE IS UNABLE TO BREATHE. ALSO IS UNABLE TO STEP UP FOR A STANDING WT SCALE. . HIS MD ARE AWARE.  AWARE.   0700- THE PT AND HIS POA POSS HAVE A PALLIATIVE CARE CONSULT THIS AM.   T0 GET INFORMATION AND GATHER INFORMATION IN REGARDS TO A PLAN OF CARE. UNSURE OF WHAT LTAC PLANS HAVE BEEN DISCUSSED AND IF POSSIBLE SINCE HE'S ON SUCH A HIGH LEVEL OF O2.  RESPS THERAPY APPLIED THE BIPAP TO THE PT . THE PT DOES NOT WANT TO WEAR IT BUT IS BEING ENCOURAGED TO TOLERATE IT FOR AS LONG AS POSSIBLE. THE PT IS MOMENTARILY AGREEABLE BUT IS VERY NOISY ABOUT HIS NOT BEING  HAPPY. PT SAFET HAS BEEN MAINTAINED THIS SHIFT.

## 2024-08-27 NOTE — DISCHARGE SUMMARY
Discharge Diagnosis  Acute on chronic combined systolic and diastolic CHF, NYHA class 4 (Multi), Acute on chronic hypoxic resp failure, long persistent A.fib    Issues Requiring Follow-Up  None    Discharge Meds     Your medication list        ASK your doctor about these medications        Instructions Last Dose Given Next Dose Due   acetaminophen 325 mg tablet  Commonly known as: Tylenol           amiodarone 200 mg tablet  Commonly known as: Pacerone      Take 1 tablet (200 mg) by mouth once daily.       aspirin 81 mg EC tablet           atorvastatin 20 mg tablet  Commonly known as: Lipitor      Take 1 tablet (20 mg) by mouth once daily.       bumetanide 2 mg tablet  Commonly known as: Bumex      Take 1 tablet (2 mg) by mouth 2 times daily (morning and late afternoon).       cephalexin 500 mg capsule  Commonly known as: Keflex  Ask about: Should I take this medication?      Take 1 capsule (500 mg) by mouth every 8 hours for 21 doses. END DATE: 8/22/24       cholecalciferol 25 MCG (1000 UT) tablet  Commonly known as: Vitamin D-3           empagliflozin 25 mg  Commonly known as: Jardiance           fluticasone 50 mcg/actuation nasal spray  Commonly known as: Flonase      Administer 2 sprays into each nostril once daily. Shake gently. Before first use, prime pump. After use, clean tip and replace cap.       fluticasone propion-salmeteroL 250-50 mcg/dose diskus inhaler  Commonly known as: Advair Diskus           isosorbide mononitrate ER 30 mg 24 hr tablet  Commonly known as: Imdur      Take 1 tablet (30 mg) by mouth once daily. Do not crush or chew.       lamoTRIgine 100 mg tablet  Commonly known as: LaMICtal           lubricating eye drops ophthalmic solution           magnesium oxide 400 mg tablet  Commonly known as: Mag-Ox           melatonin 3 mg capsule           metoprolol succinate XL 25 mg 24 hr tablet  Commonly known as: Toprol-XL      Take 1 tablet (25 mg) by mouth once daily. Do not crush or chew.        midodrine 5 mg tablet  Commonly known as: Proamatine           nicotine 7 mg/24 hr patch  Commonly known as: Nicoderm CQ           nicotine polacrilex 2 mg lozenge  Commonly known as: Commit           oxygen gas therapy  Commonly known as: O2      Inhale 1 each continuously.       traZODone 50 mg tablet  Commonly known as: Desyrel           Ventolin HFA 90 mcg/actuation inhaler  Generic drug: albuterol           albuterol 2.5 mg /3 mL (0.083 %) nebulizer solution                    Test Results Pending At Discharge  Pending Labs       No current pending labs.            Hospital Course    Pt is a 74 y.o. M with from ECF with MMP including Chronic HfrEF, COPD, CAD, HTN, Aortic valve stenosis,obesity, BMI 31, PAF, PTSD presented to ER due to worsening SOB. Pt was recently dcd to ECF after prolong hospitalization for acute hypoxic and hypercapnic resp failure. Pt apparently was noted to have pox in 70s. Pt was also noted to have Acute hypoxic resp failure required HFNC. Pt was evaluated by Pulmonary and Cardiology. His condition deteriorated earlier today, he succumbed to his illness and  earlier today.    Pertinent Physical Exam At Time of Discharge  Physical Exam  HENT:      Head: Normocephalic.      Mouth/Throat:      Pharynx: Oropharynx is clear.   Eyes:      Conjunctiva/sclera: Conjunctivae normal.   Cardiovascular:      Rate and Rhythm: Regular rhythm.   Pulmonary:      Breath sounds: Normal breath sounds.   Abdominal:      General: Bowel sounds are normal.      Palpations: Abdomen is soft.   Musculoskeletal:         General: Normal range of motion.   Skin:     General: Skin is warm and dry.   Neurological:      General: No focal deficit present.      Mental Status: He is alert.   Psychiatric:         Behavior: Behavior normal.         Outpatient Follow-Up  Pt .      Rishi Anthony MD
